# Patient Record
Sex: FEMALE | Race: WHITE | NOT HISPANIC OR LATINO | Employment: OTHER | URBAN - METROPOLITAN AREA
[De-identification: names, ages, dates, MRNs, and addresses within clinical notes are randomized per-mention and may not be internally consistent; named-entity substitution may affect disease eponyms.]

---

## 2017-01-04 DIAGNOSIS — G89.29 CHRONIC THORACIC BACK PAIN, UNSPECIFIED BACK PAIN LATERALITY: ICD-10-CM

## 2017-01-04 DIAGNOSIS — M51.36 DDD (DEGENERATIVE DISC DISEASE), LUMBAR: ICD-10-CM

## 2017-01-04 DIAGNOSIS — M54.12 CERVICAL RADICULOPATHY: ICD-10-CM

## 2017-01-04 DIAGNOSIS — M54.6 CHRONIC THORACIC BACK PAIN, UNSPECIFIED BACK PAIN LATERALITY: ICD-10-CM

## 2017-01-04 RX ORDER — MELOXICAM 7.5 MG/1
TABLET ORAL
Qty: 30 TABLET | Refills: 0 | Status: SHIPPED | OUTPATIENT
Start: 2017-01-04 | End: 2017-01-29 | Stop reason: SDUPTHER

## 2017-01-12 ENCOUNTER — PATIENT MESSAGE (OUTPATIENT)
Dept: FAMILY MEDICINE | Facility: CLINIC | Age: 62
End: 2017-01-12

## 2017-01-12 DIAGNOSIS — M25.551 RIGHT HIP PAIN: Primary | ICD-10-CM

## 2017-01-18 ENCOUNTER — HOSPITAL ENCOUNTER (OUTPATIENT)
Dept: RADIOLOGY | Facility: CLINIC | Age: 62
Discharge: HOME | End: 2017-01-18
Attending: FAMILY MEDICINE
Payer: COMMERCIAL

## 2017-01-18 DIAGNOSIS — M25.551 RIGHT HIP PAIN: ICD-10-CM

## 2017-01-18 DIAGNOSIS — J44.9 CHRONIC OBSTRUCTIVE PULMONARY DISEASE, UNSPECIFIED COPD TYPE: Chronic | ICD-10-CM

## 2017-01-18 PROCEDURE — 73502 X-RAY EXAM HIP UNI 2-3 VIEWS: CPT | Mod: 26,RT,S$GLB, | Performed by: RADIOLOGY

## 2017-01-18 PROCEDURE — 73502 X-RAY EXAM HIP UNI 2-3 VIEWS: CPT | Mod: TC,PO,RT

## 2017-01-18 RX ORDER — ALBUTEROL SULFATE 90 UG/1
AEROSOL, METERED RESPIRATORY (INHALATION)
Qty: 18 G | Refills: 0 | Status: SHIPPED | OUTPATIENT
Start: 2017-01-18 | End: 2017-02-20 | Stop reason: SDUPTHER

## 2017-01-23 ENCOUNTER — OFFICE VISIT (OUTPATIENT)
Dept: ORTHOPEDICS | Facility: CLINIC | Age: 62
End: 2017-01-23
Payer: COMMERCIAL

## 2017-01-23 VITALS — HEIGHT: 69 IN | WEIGHT: 158 LBS | BODY MASS INDEX: 23.4 KG/M2

## 2017-01-23 DIAGNOSIS — M16.9 HIP ARTHROSIS: Primary | ICD-10-CM

## 2017-01-23 DIAGNOSIS — J44.9 CHRONIC OBSTRUCTIVE PULMONARY DISEASE, UNSPECIFIED COPD TYPE: Chronic | ICD-10-CM

## 2017-01-23 PROCEDURE — 1159F MED LIST DOCD IN RCRD: CPT | Mod: S$GLB,,, | Performed by: ORTHOPAEDIC SURGERY

## 2017-01-23 PROCEDURE — 99214 OFFICE O/P EST MOD 30 MIN: CPT | Mod: 57,S$GLB,, | Performed by: ORTHOPAEDIC SURGERY

## 2017-01-23 PROCEDURE — 99999 PR PBB SHADOW E&M-EST. PATIENT-LVL II: CPT | Mod: PBBFAC,,, | Performed by: ORTHOPAEDIC SURGERY

## 2017-01-23 NOTE — PROGRESS NOTES
Abbey Hagan, 61 years old, right-sided hip pain, debilitating 10/10 on the   pain scale.  Previously undergone a left-sided hip replacement surgery,   satisfied.  At this point, she is interested in undergoing right-sided hip   replacement surgery.    PHYSICAL EXAMINATION:  Shows that hip range of motion reproduces symptoms.  Skin   is intact.  Compartments are soft.    X-rays show arthritic changes of the dysplastic hip.    ASSESSMENT:  Right hip arthrosis.    PLAN:  We will schedule for hip replacement surgery.  The patient is aware of   the risks of surgery and still wants to proceed.  We will therefore see her at   the time of surgery.      PBB/PN  dd: 01/23/2017 15:17:55 (CST)  td: 01/23/2017 18:48:02 (CST)  Doc ID   #[id number]  Job ID #[id number]    CC:     Further History  Aching pain  Worse with activity  Relieved with rest  No other associated symptoms  No other radiation    Further Exam  Alert and oriented  Pleasant  Contralateral limb has appropriate range of motion for age and condition  Contralateral limb has appropriate strength for age and condition  Contralateral limb has appropriate stability  for age and condition  No adenopathy  Pulses are appropriate for current condition  Skin is intact        Chief Complaint    Chief Complaint   Patient presents with    Hip Pain     right       HPI  Abbey Hagan is a 61 y.o.  female who presents with       Past Medical History  Past Medical History   Diagnosis Date    Arthritis      back, neck and knees    Depression     Emphysema of lung     Hypertension        Past Surgical History  No past surgical history on file.    Medications  Current Outpatient Prescriptions   Medication Sig    acyclovir 5% (ZOVIRAX) 5 % Crea 1 Application(s) Topical PRN Twice a day.    ascorbic acid (C-500) 500 MG tablet Every day    b complex vitamins (B-50 COMPLEX) tablet Every day    biotin 1 mg tablet Take 1,000 mcg by mouth 3 (three) times daily.      CHOLECALCIFEROL,  VITAMIN D3, (VITAMIN D-3 ORAL) No Sig Provided    escitalopram oxalate (LEXAPRO) 20 MG tablet TAKE 1 TABLET BY MOUTH EVERY MORNING    lisinopril-hydrochlorothiazide (PRINZIDE,ZESTORETIC) 20-25 mg Tab TAKE 1/2 TABLET BY MOUTH EVERY MORNING    meloxicam (MOBIC) 7.5 MG tablet TAKE 1 TABLET(7.5 MG) BY MOUTH EVERY DAY    multivitamin capsule Every day    POTASSIUM/MAGNESIUM (MAGNESIUM-POTASSIUM ORAL) Every day    VENTOLIN HFA 90 mcg/actuation inhaler INHALE 2 PUFFS INTO THE LUNGS EVERY 6 HOURS AS NEEDED FOR WHEEZING    zinc 50 mg Cap Every day     No current facility-administered medications for this visit.        Allergies  Review of patient's allergies indicates:   Allergen Reactions    Penicillin g     Sulfa (sulfonamide antibiotics)        Family History  Family History   Problem Relation Age of Onset    COPD Mother     Hypertension Mother     Heart disease Mother     Alcohol abuse Father     Depression Sister     Hypertension Sister     Colon cancer Maternal Grandmother     Breast cancer Paternal Aunt     Ovarian cancer Neg Hx     Psoriasis Neg Hx     Lupus Neg Hx     Eczema Neg Hx     Melanoma Neg Hx        Social History  Social History     Social History    Marital status: Single     Spouse name: N/A    Number of children: N/A    Years of education: N/A     Occupational History     [employer]     Social History Main Topics    Smoking status: Former Smoker     Packs/day: 1.00     Years: 40.00     Types: Cigarettes     Quit date: 3/15/2015    Smokeless tobacco: Not on file    Alcohol use No    Drug use: No    Sexual activity: Not Currently     Other Topics Concern    Not on file     Social History Narrative               Review of Systems     Constitutional: Negative    HENT: Negative  Eyes: Negative  Respiratory: Negative  Cardiovascular: Negative  Musculoskeletal: HPI  Skin: Negative  Neurological: Negative  Hematological: Negative  Endocrine: Negative                 Physical  Exam    There were no vitals filed for this visit.  Body mass index is 23.33 kg/(m^2).  Physical Examination:     General appearance -  well appearing, and in no distress  Mental status - awake  Neck - supple  Chest -  symmetric air entry  Heart - normal rate   Abdomen - soft      Assessment     1. Hip arthrosis    2. Chronic obstructive pulmonary disease, unspecified COPD type          Plan

## 2017-01-24 ENCOUNTER — TELEPHONE (OUTPATIENT)
Dept: ORTHOPEDICS | Facility: CLINIC | Age: 62
End: 2017-01-24

## 2017-01-24 NOTE — TELEPHONE ENCOUNTER
----- Message from Analia Pedro sent at 1/24/2017  3:25 PM CST -----  Patient is requesting a call back concerning changing the date of her surgery contact her at [phone].    Thank you

## 2017-01-25 DIAGNOSIS — M16.9 HIP ARTHROSIS: ICD-10-CM

## 2017-01-25 DIAGNOSIS — M16.11 PRIMARY OSTEOARTHRITIS OF RIGHT HIP: Primary | ICD-10-CM

## 2017-01-25 RX ORDER — ESCITALOPRAM OXALATE 20 MG/1
TABLET ORAL
Qty: 30 TABLET | Refills: 0 | Status: SHIPPED | OUTPATIENT
Start: 2017-01-25 | End: 2017-02-22 | Stop reason: SDUPTHER

## 2017-01-25 NOTE — TELEPHONE ENCOUNTER
----- Message from Shelli Gutierrez LPN sent at 1/25/2017  1:46 PM CST -----  Pt is scheduled for right total hip arthroplasty on 2/14/17 with Dr. Sorto, are you able to clear by chart or fit in for a preop clearance?    Thanks,  Shelli

## 2017-01-26 ENCOUNTER — PATIENT MESSAGE (OUTPATIENT)
Dept: FAMILY MEDICINE | Facility: CLINIC | Age: 62
End: 2017-01-26

## 2017-01-26 ENCOUNTER — TELEPHONE (OUTPATIENT)
Dept: FAMILY MEDICINE | Facility: CLINIC | Age: 62
End: 2017-01-26

## 2017-01-26 DIAGNOSIS — Z96.641 STATUS POST TOTAL REPLACEMENT OF RIGHT HIP: Primary | ICD-10-CM

## 2017-01-26 RX ORDER — OXYCODONE AND ACETAMINOPHEN 5; 325 MG/1; MG/1
1 TABLET ORAL
Qty: 47 TABLET | Refills: 0 | Status: SHIPPED | OUTPATIENT
Start: 2017-02-13 | End: 2017-06-28 | Stop reason: SDDI

## 2017-01-26 RX ORDER — WARFARIN SODIUM 5 MG/1
5 TABLET ORAL DAILY
Qty: 30 TABLET | Refills: 1 | Status: SHIPPED | OUTPATIENT
Start: 2017-02-13 | End: 2017-04-27 | Stop reason: ALTCHOICE

## 2017-01-26 NOTE — TELEPHONE ENCOUNTER
The patient has been scheduled for pre op clearance with Dr Garcia on 2/9/17 at 2 pm. The patient has been informed and agrees to the date and time.

## 2017-01-26 NOTE — TELEPHONE ENCOUNTER
----- Message from Shelli Gutierrez LPN sent at 1/26/2017 11:07 AM CST -----  Can pt be seen by another provider for clearance? Surgery is scheduled 2/14/17.    Thanks,  Shelli

## 2017-01-29 DIAGNOSIS — M54.6 CHRONIC THORACIC BACK PAIN, UNSPECIFIED BACK PAIN LATERALITY: ICD-10-CM

## 2017-01-29 DIAGNOSIS — G89.29 CHRONIC THORACIC BACK PAIN, UNSPECIFIED BACK PAIN LATERALITY: ICD-10-CM

## 2017-01-29 DIAGNOSIS — M54.12 CERVICAL RADICULOPATHY: ICD-10-CM

## 2017-01-29 DIAGNOSIS — M51.36 DDD (DEGENERATIVE DISC DISEASE), LUMBAR: ICD-10-CM

## 2017-01-30 ENCOUNTER — PATIENT MESSAGE (OUTPATIENT)
Dept: ORTHOPEDICS | Facility: CLINIC | Age: 62
End: 2017-01-30

## 2017-01-30 ENCOUNTER — ANTI-COAG VISIT (OUTPATIENT)
Dept: CARDIOLOGY | Facility: CLINIC | Age: 62
End: 2017-01-30

## 2017-01-30 RX ORDER — MELOXICAM 7.5 MG/1
TABLET ORAL
Qty: 30 TABLET | Refills: 0 | Status: SHIPPED | OUTPATIENT
Start: 2017-01-30 | End: 2017-02-23 | Stop reason: SDUPTHER

## 2017-02-09 ENCOUNTER — LAB VISIT (OUTPATIENT)
Dept: LAB | Facility: HOSPITAL | Age: 62
End: 2017-02-09
Attending: FAMILY MEDICINE
Payer: COMMERCIAL

## 2017-02-09 ENCOUNTER — OFFICE VISIT (OUTPATIENT)
Dept: FAMILY MEDICINE | Facility: CLINIC | Age: 62
End: 2017-02-09
Payer: COMMERCIAL

## 2017-02-09 ENCOUNTER — HOSPITAL ENCOUNTER (OUTPATIENT)
Dept: RADIOLOGY | Facility: CLINIC | Age: 62
Discharge: HOME | End: 2017-02-09
Attending: FAMILY MEDICINE
Payer: COMMERCIAL

## 2017-02-09 VITALS
HEART RATE: 78 BPM | BODY MASS INDEX: 22.3 KG/M2 | TEMPERATURE: 99 F | DIASTOLIC BLOOD PRESSURE: 69 MMHG | HEIGHT: 69 IN | OXYGEN SATURATION: 96 % | SYSTOLIC BLOOD PRESSURE: 121 MMHG | WEIGHT: 150.56 LBS

## 2017-02-09 DIAGNOSIS — Z96.641 STATUS POST TOTAL REPLACEMENT OF RIGHT HIP: Primary | ICD-10-CM

## 2017-02-09 DIAGNOSIS — Z01.818 PRE-OP EXAMINATION: ICD-10-CM

## 2017-02-09 DIAGNOSIS — Z01.818 PRE-OP EXAMINATION: Primary | ICD-10-CM

## 2017-02-09 LAB
ANION GAP SERPL CALC-SCNC: 7 MMOL/L
BASOPHILS # BLD AUTO: 0.03 K/UL
BASOPHILS NFR BLD: 0.3 %
BUN SERPL-MCNC: 12 MG/DL
CALCIUM SERPL-MCNC: 9.5 MG/DL
CHLORIDE SERPL-SCNC: 96 MMOL/L
CO2 SERPL-SCNC: 33 MMOL/L
CREAT SERPL-MCNC: 0.8 MG/DL
DIFFERENTIAL METHOD: ABNORMAL
EOSINOPHIL # BLD AUTO: 0.1 K/UL
EOSINOPHIL NFR BLD: 1.4 %
ERYTHROCYTE [DISTWIDTH] IN BLOOD BY AUTOMATED COUNT: 12.8 %
EST. GFR  (AFRICAN AMERICAN): >60 ML/MIN/1.73 M^2
EST. GFR  (NON AFRICAN AMERICAN): >60 ML/MIN/1.73 M^2
GLUCOSE SERPL-MCNC: 96 MG/DL
HCT VFR BLD AUTO: 43.2 %
HGB BLD-MCNC: 14.6 G/DL
INR PPP: 1
LYMPHOCYTES # BLD AUTO: 2.2 K/UL
LYMPHOCYTES NFR BLD: 22.6 %
MCH RBC QN AUTO: 32.7 PG
MCHC RBC AUTO-ENTMCNC: 33.8 %
MCV RBC AUTO: 97 FL
MONOCYTES # BLD AUTO: 0.9 K/UL
MONOCYTES NFR BLD: 9.3 %
NEUTROPHILS # BLD AUTO: 6.6 K/UL
NEUTROPHILS NFR BLD: 66.2 %
PLATELET # BLD AUTO: 252 K/UL
PMV BLD AUTO: 10.8 FL
POTASSIUM SERPL-SCNC: 3.6 MMOL/L
PROTHROMBIN TIME: 10.3 SEC
RBC # BLD AUTO: 4.47 M/UL
SODIUM SERPL-SCNC: 136 MMOL/L
WBC # BLD AUTO: 9.93 K/UL

## 2017-02-09 PROCEDURE — 36415 COLL VENOUS BLD VENIPUNCTURE: CPT | Mod: PO

## 2017-02-09 PROCEDURE — 99213 OFFICE O/P EST LOW 20 MIN: CPT | Mod: S$GLB,,, | Performed by: FAMILY MEDICINE

## 2017-02-09 PROCEDURE — 3078F DIAST BP <80 MM HG: CPT | Mod: S$GLB,,, | Performed by: FAMILY MEDICINE

## 2017-02-09 PROCEDURE — 85610 PROTHROMBIN TIME: CPT

## 2017-02-09 PROCEDURE — 93010 ELECTROCARDIOGRAM REPORT: CPT | Mod: S$GLB,,, | Performed by: INTERNAL MEDICINE

## 2017-02-09 PROCEDURE — 3074F SYST BP LT 130 MM HG: CPT | Mod: S$GLB,,, | Performed by: FAMILY MEDICINE

## 2017-02-09 PROCEDURE — 71020 XR CHEST PA AND LATERAL: CPT | Mod: TC,PO

## 2017-02-09 PROCEDURE — 85025 COMPLETE CBC W/AUTO DIFF WBC: CPT

## 2017-02-09 PROCEDURE — 99999 PR PBB SHADOW E&M-EST. PATIENT-LVL III: CPT | Mod: PBBFAC,,, | Performed by: FAMILY MEDICINE

## 2017-02-09 PROCEDURE — 80048 BASIC METABOLIC PNL TOTAL CA: CPT

## 2017-02-09 PROCEDURE — 93005 ELECTROCARDIOGRAM TRACING: CPT | Mod: S$GLB,,, | Performed by: FAMILY MEDICINE

## 2017-02-09 PROCEDURE — 71020 XR CHEST PA AND LATERAL: CPT | Mod: 26,,, | Performed by: RADIOLOGY

## 2017-02-09 RX ORDER — CLINDAMYCIN HYDROCHLORIDE 300 MG/1
600 CAPSULE ORAL 3 TIMES DAILY
Qty: 30 CAPSULE | Refills: 0 | Status: SHIPPED | OUTPATIENT
Start: 2017-02-14 | End: 2017-02-19

## 2017-02-09 NOTE — PROGRESS NOTES
Subjective:     Pt presents to the office today for a preoperative consultation at the request of surgeon Dr. Holden who plans on performing T-hip on right on February 14.   This consultation is requested for the specific conditions prompting preoperative evaluation (i.e. because of potential affect on operative risk): Preop clearance.   Planned anesthesia: general.   The patient has the following known anesthesia issues: No history of complications 2/2 anesthesia. .   Patients bleeding risk: no recent abnormal bleeding.   Patient does not have objections to receiving blood products if needed.    The following portions of the patient's history were reviewed and updated as appropriate: allergies, current medications, past family history, past medical history, past social history, past surgical history and problem list.    Review of Systems  Review of Systems   Constitutional: Negative for chills and fever.   HENT: Negative for sore throat.    Eyes: Negative for visual disturbance.   Respiratory: Negative for cough and shortness of breath.    Cardiovascular: Negative for chest pain and leg swelling.   Gastrointestinal: Negative for abdominal pain, blood in stool, constipation, diarrhea and vomiting.   Genitourinary: Negative for difficulty urinating, dysuria and hematuria.   Musculoskeletal: Positive for arthralgias.   Neurological: Negative for dizziness and weakness.      Objective:     Physical Exam   Constitutional: She appears well-developed and well-nourished. No distress.   HENT:   Head: Normocephalic and atraumatic.   Mouth/Throat: Oropharynx is clear and moist. No oropharyngeal exudate.   Eyes: EOM are normal. Pupils are equal, round, and reactive to light.   Neck: Normal range of motion. Neck supple. No thyromegaly present.   Cardiovascular: Normal rate, regular rhythm, normal heart sounds and intact distal pulses.    Pulmonary/Chest: Effort normal and breath sounds normal. No respiratory distress. She has  no wheezes.   Abdominal: Soft. Bowel sounds are normal. She exhibits no distension and no mass. There is no tenderness.   Musculoskeletal: She exhibits no edema.   Lymphadenopathy:     She has no cervical adenopathy.   Neurological: She is alert.   Skin: Skin is warm. No rash noted. No erythema.   Psychiatric: She has a normal mood and affect. Her behavior is normal.   Vitals reviewed.      Predictors of intubation difficulty:   Morbid obesity? no   Anatomically abnormal facies? no   Prominent incisors? no   Receding mandible? no   Short, thick neck? no   Neck range of motion: normal   Mallampati score: III (soft and hard palate and base of uvula visible)    Cardiographics  ECG: Sinus bradycardia    Imaging  Chest x-ray:   1.  No acute cardiopulmonary process.  2.  Appearance suggesting aortic stenosis.    Lab Review   Pending     Assessment:        73 y.o. female with planned surgery as above.    Known risk factors for perioperative complications: None    Difficulty with intubation is not anticipated.     Plan:     1. Pre-op examination  - CXR  - Basic metabolic panel; Future  - CBC auto differential; Future  - PROTIME-INR; Future  - EKG RHYTHM STRIP (to Muse); Future  - Urinalysis    2/13/17 Addendum:  CXR results show concern for aortic stenosis, therefore, will obtain Echo in order to evaluate.   Pt notified in face to face encounter and acknowledges understanding.   She is not cleared until further work up has been performed.       Revised Cardiac Risk Index for Pre-Operative Risk    [email or web address]    1. Preoperative workup as follows chest x-ray, ECG, hemoglobin, hematocrit, electrolytes, creatinine, glucose, coagulation studies.  2. Change in medication regimen before surgery: Not on anticoagulation   3. Prophylaxis for cardiac events with perioperative beta-blockers: not indicated.  4. Invasive hemodynamic monitoring perioperatively: at the discretion of  "anesthesiologist.  5. Deep vein thrombosis prophylaxis postoperatively:regimen to be chosen by surgical team.  6. Surveillance for postoperative MI with ECG immediately postoperatively and on postoperative days 1 and 2 AND troponin levels 24 hours postoperatively and on day 4 or hospital discharge (whichever comes first): at the discretion of anesthesiologist.  7. Other measures: None     Portions of this note were created using Dragon voice recognition software. There may be voice recognition errors found in the text, and attempts were made to correct these errors prior to signature    Daryl Garcia MD    Family Medicine  2/9/2017    Addendum: Recent echocardiogram was performed for concerns of aortic stenosis rest on plain films of the patient's chest.  Echocardiogram results do not suggest aortic stenosis present.      Per read," Aorta: The aortic root is normal in size, measuring 3.3 cm at sinotubular junction and 3.0 cm at Sinuses of Valsalva. The proximal ascending aorta is normal in size, measuring 3.4 cm across.     Based on these findings, patient is cleared for surgical procedure from a family medicine standpoint at this time.    Portions of this note were created using Dragon voice recognition software. There may be voice recognition errors found in the text, and attempts were made to correct these errors prior to signature    Daryl Garcia MD    [facility]  2/22/2017    "

## 2017-02-09 NOTE — PATIENT INSTRUCTIONS
How Your Back Works  A healthy back allows you to bend and stretch without pain. The spine has three natural curves, which keep your body balanced. Strong, flexible muscles support your spine. Soft, cushioning disks separate the hard bones of your spine, allowing it to bend and move.    The parts of the spine  · The vertebrae are the 24 bones that make up the spine.  · The spinous process is the part of each vertebra you can feel through your skin.  · Each of these bones has a canal that runs top to bottom. Together these canals form a tunnel called the spinal canal.  · The lamina of each vertebra forms the back of the spinal canal.  · Running through the canal are nerves.  · A foramen is a small opening where a nerve leaves the spinal canal.  · Disks serve as cushions between vertebrae. A disks soft center absorbs shock during movement.     Two vertebrae and a disk     The supporting muscles  Strong, flexible muscles help maintain your three natural curves. They hold your spine in proper alignment. This helps support your upper body. Strong core muscular including the stomach, buttock, and thigh muscles help take the strain off your back.  Date Last Reviewed: 8/31/2015  © 6758-9736 [employer]. [address] [place] [address], [place] [address]. All rights reserved. This information is not intended as a substitute for professional medical care. Always follow your healthcare professional's instructions.

## 2017-02-09 NOTE — MR AVS SNAPSHOT
[facility]  [address]  [place] [address]  Phone: [phone]  Fax: [phone]                  Abbey Hagan   2017 2:00 PM   Office Visit    Description:  Female : 1955   Provider:  Daryl Garcia MD   Department:  [facility]           Reason for Visit     Pre-op Exam           Diagnoses this Visit        Comments    Pre-op examination    -  Primary            To Do List           Future Appointments        Provider Department Dept Phone    2017 2:45 PM LAB, [facility] [place] [facility] - Lab [phone]    2017 3:30 PM [facility] XR1 [facility]- X-Ray [phone]    2017 9:00 AM NS [facility] [phone]    2017 11:15 AM Sandip Sorto MD [facility] [phone]    2017 4:20 PM Daryl Garcia MD [facility] [phone]      Your Future Surgeries/Procedures     2017   Surgery with Sandip Sorto MD   [facility]    [address] [facility] [address] [place] [address]   [phone]              Goals (5 Years of Data)     None      Follow-Up and Disposition     Return in about 6 months (around 2017).      [facility]     [facility] Care [facility] -  Assistance  Registered nurses in the [facility] provide clinical advisement, health education, appointment booking, and other advisory services.  Call for this free service at [phone].             Medications           Message regarding Medications     Verify the changes and/or additions to your medication regime listed below are the same as discussed with your clinician today.  If any of these changes or additions are incorrect, please notify your healthcare provider.             Verify that the below list of medications is an accurate representation of the medications you are currently taking.  If none reported, the list may be blank. If  "incorrect, please contact your healthcare provider. Carry this list with you in case of emergency.           Current Medications     acyclovir 5% (ZOVIRAX) 5 % Crea 1 Application(s) Topical PRN Twice a day.    ascorbic acid (C-500) 500 MG tablet Every day    b complex vitamins (B-50 COMPLEX) tablet Every day    biotin 1 mg tablet Take 1,000 mcg by mouth 3 (three) times daily.      CHOLECALCIFEROL, VITAMIN D3, (VITAMIN D-3 ORAL) No Sig Provided    clindamycin (CLEOCIN) 300 MG capsule Starting on Feb 14, 2017. Take 2 capsules (600 mg total) by mouth 3 (three) times daily.    escitalopram oxalate (LEXAPRO) 20 MG tablet TAKE 1 TABLET BY MOUTH EVERY MORNING    lisinopril-hydrochlorothiazide (PRINZIDE,ZESTORETIC) 20-25 mg Tab TAKE 1/2 TABLET BY MOUTH EVERY MORNING    meloxicam (MOBIC) 7.5 MG tablet TAKE 1 TABLET(7.5 MG) BY MOUTH EVERY DAY    multivitamin capsule Every day    oxycodone-acetaminophen (PERCOCET) 5-325 mg per tablet Starting on Feb 13, 2017. Take 1 tablet by mouth every 4 to 6 hours as needed for Pain.    POTASSIUM/MAGNESIUM (MAGNESIUM-POTASSIUM ORAL) Every day    VENTOLIN HFA 90 mcg/actuation inhaler INHALE 2 PUFFS INTO THE LUNGS EVERY 6 HOURS AS NEEDED FOR WHEEZING    warfarin (COUMADIN) 5 MG tablet Starting on Feb 13, 2017. Take 1 tablet (5 mg total) by mouth Daily. Start night before surgery    zinc 50 mg Cap Every day           Clinical Reference Information           Your Vitals Were     BP Pulse Temp Height Weight Last Period    121/69 78 99 °F (37.2 °C) 5' 9" (1.753 m) 68.3 kg (150 lb 9.2 oz) 12/06/1988    SpO2 BMI             96% 22.24 kg/m2         Blood Pressure          Most Recent Value    BP  121/69      Allergies as of 2/9/2017     Penicillin G    Sulfa (Sulfonamide Antibiotics)      Immunizations Administered on Date of Encounter - 2/9/2017     None      Orders Placed During Today's Visit      Normal Orders This Visit    Urinalysis     Future Labs/Procedures Expected by Expires    Basic " metabolic panel  2/9/2017 4/10/2018    CBC auto differential  2/9/2017 4/10/2018    PROTIME-INR  2/9/2017 4/10/2018    X-Ray Chest PA And Lateral  2/9/2017 2/9/2018    EKG RHYTHM STRIP (to Muse)  As directed 2/9/2018      Instructions      How Your Back Works  A healthy back allows you to bend and stretch without pain. The spine has three natural curves, which keep your body balanced. Strong, flexible muscles support your spine. Soft, cushioning disks separate the hard bones of your spine, allowing it to bend and move.    The parts of the spine  · The vertebrae are the 24 bones that make up the spine.  · The spinous process is the part of each vertebra you can feel through your skin.  · Each of these bones has a canal that runs top to bottom. Together these canals form a tunnel called the spinal canal.  · The lamina of each vertebra forms the back of the spinal canal.  · Running through the canal are nerves.  · A foramen is a small opening where a nerve leaves the spinal canal.  · Disks serve as cushions between vertebrae. A disks soft center absorbs shock during movement.     Two vertebrae and a disk     The supporting muscles  Strong, flexible muscles help maintain your three natural curves. They hold your spine in proper alignment. This helps support your upper body. Strong core muscular including the stomach, buttock, and thigh muscles help take the strain off your back.  Date Last Reviewed: 8/31/2015  © 4222-8569 [employer]. [address] [place] [address]. All rights reserved. This information is not intended as a substitute for professional medical care. Always follow your healthcare professional's instructions.             Language Assistance Services     ATTENTION: Language assistance services are available, free of charge. Please call [phone].      ATENCIÓN: Si habla español, tiene a panda disposición servicios gratuitos de asistencia lingüística. Llame al  [phone].     FRANCESCO Ý: N?u b?n nói Ti?ng Vi?t, có các d?ch v? h? tr? ngôn ng? mi?n phí dành cho b?n. G?i s? [phone].         [facility] complies with applicable Federal civil rights laws and does not discriminate on the basis of race, color, national origin, age, disability, or sex.

## 2017-02-10 ENCOUNTER — TELEPHONE (OUTPATIENT)
Dept: FAMILY MEDICINE | Facility: CLINIC | Age: 62
End: 2017-02-10

## 2017-02-13 ENCOUNTER — PATIENT MESSAGE (OUTPATIENT)
Dept: FAMILY MEDICINE | Facility: CLINIC | Age: 62
End: 2017-02-13

## 2017-02-13 ENCOUNTER — TELEPHONE (OUTPATIENT)
Dept: FAMILY MEDICINE | Facility: CLINIC | Age: 62
End: 2017-02-13

## 2017-02-13 ENCOUNTER — ANESTHESIA EVENT (OUTPATIENT)
Dept: SURGERY | Facility: HOSPITAL | Age: 62
End: 2017-02-13
Payer: COMMERCIAL

## 2017-02-13 DIAGNOSIS — R93.89 ABNORMAL CHEST X-RAY: Primary | ICD-10-CM

## 2017-02-13 DIAGNOSIS — Z96.649 STATUS POST TOTAL REPLACEMENT OF HIP, UNSPECIFIED LATERALITY: Primary | ICD-10-CM

## 2017-02-13 RX ORDER — IBUPROFEN 800 MG/1
800 TABLET ORAL 3 TIMES DAILY
Qty: 30 TABLET | Refills: 3 | Status: SHIPPED | OUTPATIENT
Start: 2017-02-13 | End: 2017-12-29 | Stop reason: SDUPTHER

## 2017-02-13 NOTE — TELEPHONE ENCOUNTER
----- Message from Neeru Mcleod sent at 2/10/2017 12:15 PM CST -----  Contact: Abbey Sheehan is returning call. Please call [phone]. Thanks!

## 2017-02-14 ENCOUNTER — ANESTHESIA (OUTPATIENT)
Dept: SURGERY | Facility: HOSPITAL | Age: 62
End: 2017-02-14
Payer: COMMERCIAL

## 2017-02-14 NOTE — TELEPHONE ENCOUNTER
----- Message from Refugio De Leon sent at 2/14/2017  9:19 AM CST -----  Contact: pt  Pt is calling to schedule a test  Call Back#[phone]  Thanks

## 2017-02-16 ENCOUNTER — PATIENT MESSAGE (OUTPATIENT)
Dept: ORTHOPEDICS | Facility: CLINIC | Age: 62
End: 2017-02-16

## 2017-02-20 DIAGNOSIS — J44.9 CHRONIC OBSTRUCTIVE PULMONARY DISEASE, UNSPECIFIED COPD TYPE: Chronic | ICD-10-CM

## 2017-02-21 ENCOUNTER — HOSPITAL ENCOUNTER (OUTPATIENT)
Dept: CARDIOLOGY | Facility: HOSPITAL | Age: 62
Discharge: HOME | End: 2017-02-21
Attending: FAMILY MEDICINE
Payer: COMMERCIAL

## 2017-02-21 ENCOUNTER — PATIENT MESSAGE (OUTPATIENT)
Dept: ORTHOPEDICS | Facility: CLINIC | Age: 62
End: 2017-02-21

## 2017-02-21 DIAGNOSIS — R93.89 ABNORMAL CHEST X-RAY: ICD-10-CM

## 2017-02-21 LAB
DIASTOLIC DYSFUNCTION: NO
ESTIMATED PA SYSTOLIC PRESSURE: 26.04
RETIRED EF AND QEF - SEE NOTES: 65 (ref 55–65)

## 2017-02-21 PROCEDURE — 63600175 PHARM REV CODE 636 W HCPCS: Performed by: INTERNAL MEDICINE

## 2017-02-21 PROCEDURE — 96374 THER/PROPH/DIAG INJ IV PUSH: CPT

## 2017-02-21 PROCEDURE — 93306 TTE W/DOPPLER COMPLETE: CPT | Mod: 26,,, | Performed by: INTERNAL MEDICINE

## 2017-02-21 RX ORDER — ALBUTEROL SULFATE 90 UG/1
AEROSOL, METERED RESPIRATORY (INHALATION)
Qty: 18 G | Refills: 0 | Status: SHIPPED | OUTPATIENT
Start: 2017-02-21 | End: 2017-03-19 | Stop reason: SDUPTHER

## 2017-02-22 ENCOUNTER — PATIENT MESSAGE (OUTPATIENT)
Dept: FAMILY MEDICINE | Facility: CLINIC | Age: 62
End: 2017-02-22

## 2017-02-22 ENCOUNTER — TELEPHONE (OUTPATIENT)
Dept: FAMILY MEDICINE | Facility: CLINIC | Age: 62
End: 2017-02-22

## 2017-02-22 ENCOUNTER — PATIENT MESSAGE (OUTPATIENT)
Dept: ORTHOPEDICS | Facility: CLINIC | Age: 62
End: 2017-02-22

## 2017-02-22 RX ORDER — ESCITALOPRAM OXALATE 20 MG/1
TABLET ORAL
Qty: 30 TABLET | Refills: 0 | Status: SHIPPED | OUTPATIENT
Start: 2017-02-22 | End: 2017-03-27 | Stop reason: SDUPTHER

## 2017-02-22 NOTE — TELEPHONE ENCOUNTER
"Based on recent echocardiogram results, no evidence of aortic stenosis per read.     "Aorta: The aortic root is normal in size, measuring 3.3 cm at sinotubular junction and 3.0 cm at Sinuses of Valsalva. The proximal ascending aorta is normal in size, measuring 3.4 cm across. "    Based on these results, patient is cleared for surgery from a family medicine standpoint at this time.  Please refer to preoperative note on February 19, 2017 as an addendum has been made to reflect this.    Portions of this note were created using Dragon voice recognition software. There may be voice recognition errors found in the text, and attempts were made to correct these errors prior to signature    Daryl Garcia MD    Family Medicine  2/22/2017    "

## 2017-02-22 NOTE — TELEPHONE ENCOUNTER
Please advise on surgery clearance once you review her echo. I'm not sure if you have paperwork or not for her.

## 2017-02-22 NOTE — TELEPHONE ENCOUNTER
----- Message from Shelli Gutierrez LPN sent at 2/22/2017  7:16 AM CST -----  Pt is ready to schedule surgery. Echo done yesterday. Is she clear for surgery?      Thanks,  Shelli

## 2017-02-23 DIAGNOSIS — M51.36 DDD (DEGENERATIVE DISC DISEASE), LUMBAR: ICD-10-CM

## 2017-02-23 DIAGNOSIS — M54.6 CHRONIC THORACIC BACK PAIN, UNSPECIFIED BACK PAIN LATERALITY: ICD-10-CM

## 2017-02-23 DIAGNOSIS — M54.12 CERVICAL RADICULOPATHY: ICD-10-CM

## 2017-02-23 DIAGNOSIS — G89.29 CHRONIC THORACIC BACK PAIN, UNSPECIFIED BACK PAIN LATERALITY: ICD-10-CM

## 2017-02-23 RX ORDER — MELOXICAM 7.5 MG/1
TABLET ORAL
Qty: 30 TABLET | Refills: 0 | Status: SHIPPED | OUTPATIENT
Start: 2017-02-23 | End: 2017-06-28 | Stop reason: SDUPTHER

## 2017-02-23 NOTE — TELEPHONE ENCOUNTER
Ibuprofen on med list.  Please advise not to take ibuprofen with meloxicam at the same time since they are both NSAID's.

## 2017-03-09 ENCOUNTER — PATIENT MESSAGE (OUTPATIENT)
Dept: ORTHOPEDICS | Facility: CLINIC | Age: 62
End: 2017-03-09

## 2017-03-13 ENCOUNTER — LAB VISIT (OUTPATIENT)
Dept: LAB | Facility: HOSPITAL | Age: 62
End: 2017-03-13
Attending: FAMILY MEDICINE
Payer: COMMERCIAL

## 2017-03-13 DIAGNOSIS — Z96.649 STATUS POST TOTAL REPLACEMENT OF HIP, UNSPECIFIED LATERALITY: ICD-10-CM

## 2017-03-13 LAB
ABO + RH BLD: NORMAL
BLD GP AB SCN CELLS X3 SERPL QL: NORMAL

## 2017-03-13 PROCEDURE — 86900 BLOOD TYPING SEROLOGIC ABO: CPT

## 2017-03-13 PROCEDURE — 86901 BLOOD TYPING SEROLOGIC RH(D): CPT

## 2017-03-13 PROCEDURE — 36415 COLL VENOUS BLD VENIPUNCTURE: CPT

## 2017-03-13 NOTE — H&P
Abbey Hagan, 61 years old, right-sided hip pain, debilitating 10/10 on the   pain scale. Previously undergone a left-sided hip replacement surgery,   satisfied. At this point, she is interested in undergoing right-sided hip   replacement surgery.     PHYSICAL EXAMINATION: Shows that hip range of motion reproduces symptoms. Skin  is intact. Compartments are soft.     X-rays show arthritic changes of the dysplastic hip.     ASSESSMENT: Right hip arthrosis.     PLAN: We will schedule for hip replacement surgery. The patient is aware of   the risks of surgery and still wants to proceed. We will therefore see her at   the time of surgery.        PBB/PN dd: 01/23/2017 15:17:55 (CST) td: 01/23/2017 18:48:02 (CST) Doc ID   #[id number] Job ID #[id number]     CC:      Further History  Aching pain  Worse with activity  Relieved with rest  No other associated symptoms  No other radiation     Further Exam  Alert and oriented  Pleasant  Contralateral limb has appropriate range of motion for age and condition  Contralateral limb has appropriate strength for age and condition  Contralateral limb has appropriate stability for age and condition  No adenopathy  Pulses are appropriate for current condition  Skin is intact           Chief Complaint          Chief Complaint   Patient presents with    Hip Pain       right         HPI  Abbey Hagan is a 61 y.o. female who presents with         Past Medical History        Past Medical History   Diagnosis Date    Arthritis         back, neck and knees    Depression      Emphysema of lung      Hypertension           Past Surgical History  No past surgical history on file.     Medications       Current Outpatient Prescriptions   Medication Sig    acyclovir 5% (ZOVIRAX) 5 % Crea 1 Application(s) Topical PRN Twice a day.    ascorbic acid (C-500) 500 MG tablet Every day    b complex vitamins (B-50 COMPLEX) tablet Every day    biotin 1 mg tablet Take 1,000 mcg by mouth 3 (three) times  daily.     CHOLECALCIFEROL, VITAMIN D3, (VITAMIN D-3 ORAL) No Sig Provided    escitalopram oxalate (LEXAPRO) 20 MG tablet TAKE 1 TABLET BY MOUTH EVERY MORNING    lisinopril-hydrochlorothiazide (PRINZIDE,ZESTORETIC) 20-25 mg Tab TAKE 1/2 TABLET BY MOUTH EVERY MORNING    meloxicam (MOBIC) 7.5 MG tablet TAKE 1 TABLET(7.5 MG) BY MOUTH EVERY DAY    multivitamin capsule Every day    POTASSIUM/MAGNESIUM (MAGNESIUM-POTASSIUM ORAL) Every day    VENTOLIN HFA 90 mcg/actuation inhaler INHALE 2 PUFFS INTO THE LUNGS EVERY 6 HOURS AS NEEDED FOR WHEEZING    zinc 50 mg Cap Every day      No current facility-administered medications for this visit.          Allergies       Review of patient's allergies indicates:   Allergen Reactions    Penicillin g      Sulfa (sulfonamide antibiotics)           Family History        Family History   Problem Relation Age of Onset    COPD Mother      Hypertension Mother      Heart disease Mother      Alcohol abuse Father      Depression Sister      Hypertension Sister      Colon cancer Maternal Grandmother      Breast cancer Paternal Aunt      Ovarian cancer Neg Hx      Psoriasis Neg Hx      Lupus Neg Hx      Eczema Neg Hx      Melanoma Neg Hx           Social History   Social History    Social History            Social History    Marital status: Single       Spouse name: N/A    Number of children: N/A    Years of education: N/A           Occupational History      [employer]      Social History Main Topics    Smoking status: Former Smoker       Packs/day: 1.00       Years: 40.00       Types: Cigarettes       Quit date: 3/15/2015    Smokeless tobacco: Not on file    Alcohol use No    Drug use: No    Sexual activity: Not Currently           Other Topics Concern    Not on file      Social History Narrative                        Review of Systems      Constitutional: Negative   HENT: Negative  Eyes: Negative  Respiratory: Negative  Cardiovascular:  Negative  Musculoskeletal: HPI  Skin: Negative  Neurological: Negative  Hematological: Negative  Endocrine: Negative                        Physical Exam     There were no vitals filed for this visit.  Body mass index is 23.33 kg/(m^2).  Physical Examination:      General appearance - well appearing, and in no distress  Mental status - awake  Neck - supple  Chest - symmetric air entry  Heart - normal rate   Abdomen - soft        Assessment      1. Hip arthrosis    2. Chronic obstructive pulmonary disease, unspecified COPD type

## 2017-03-14 ENCOUNTER — HOSPITAL ENCOUNTER (OUTPATIENT)
Facility: HOSPITAL | Age: 62
Discharge: HOME | End: 2017-03-14
Attending: ORTHOPAEDIC SURGERY | Admitting: ORTHOPAEDIC SURGERY
Payer: COMMERCIAL

## 2017-03-14 ENCOUNTER — HOSPITAL ENCOUNTER (OUTPATIENT)
Dept: RADIOLOGY | Facility: HOSPITAL | Age: 62
Discharge: HOME | End: 2017-03-14
Attending: ORTHOPAEDIC SURGERY | Admitting: ORTHOPAEDIC SURGERY
Payer: COMMERCIAL

## 2017-03-14 DIAGNOSIS — G89.29 HIP PAIN, CHRONIC, RIGHT: ICD-10-CM

## 2017-03-14 DIAGNOSIS — M25.551 HIP PAIN, CHRONIC, RIGHT: ICD-10-CM

## 2017-03-14 DIAGNOSIS — M16.9 HIP ARTHROSIS: ICD-10-CM

## 2017-03-14 DIAGNOSIS — M16.11 PRIMARY OSTEOARTHRITIS OF RIGHT HIP: ICD-10-CM

## 2017-03-14 DIAGNOSIS — M25.551 RIGHT HIP PAIN: ICD-10-CM

## 2017-03-14 PROCEDURE — D9220A PRA ANESTHESIA: Mod: CRNA,,, | Performed by: NURSE ANESTHETIST, CERTIFIED REGISTERED

## 2017-03-14 PROCEDURE — 37000008 HC ANESTHESIA 1ST 15 MINUTES: Mod: PO | Performed by: ORTHOPAEDIC SURGERY

## 2017-03-14 PROCEDURE — 27201423 OPTIME MED/SURG SUP & DEVICES STERILE SUPPLY: Mod: PO | Performed by: ORTHOPAEDIC SURGERY

## 2017-03-14 PROCEDURE — 76000 FLUOROSCOPY <1 HR PHYS/QHP: CPT | Mod: TC,PO

## 2017-03-14 PROCEDURE — 76000 FLUOROSCOPY <1 HR PHYS/QHP: CPT | Mod: 26,,, | Performed by: RADIOLOGY

## 2017-03-14 PROCEDURE — D9220A PRA ANESTHESIA: Mod: ANES,,, | Performed by: ANESTHESIOLOGY

## 2017-03-14 PROCEDURE — 37000009 HC ANESTHESIA EA ADD 15 MINS: Mod: PO | Performed by: ORTHOPAEDIC SURGERY

## 2017-03-14 PROCEDURE — 25000003 PHARM REV CODE 250: Mod: PO | Performed by: NURSE ANESTHETIST, CERTIFIED REGISTERED

## 2017-03-14 PROCEDURE — 27130 TOTAL HIP ARTHROPLASTY: CPT | Mod: RT,,, | Performed by: ORTHOPAEDIC SURGERY

## 2017-03-14 PROCEDURE — C1713 ANCHOR/SCREW BN/BN,TIS/BN: HCPCS | Mod: PO | Performed by: ORTHOPAEDIC SURGERY

## 2017-03-14 PROCEDURE — 63600175 PHARM REV CODE 636 W HCPCS: Mod: PO | Performed by: ANESTHESIOLOGY

## 2017-03-14 PROCEDURE — 71000039 HC RECOVERY, EACH ADD'L HOUR: Mod: PO | Performed by: ORTHOPAEDIC SURGERY

## 2017-03-14 PROCEDURE — 73502 X-RAY EXAM HIP UNI 2-3 VIEWS: CPT | Mod: 26,RT,, | Performed by: RADIOLOGY

## 2017-03-14 PROCEDURE — C1776 JOINT DEVICE (IMPLANTABLE): HCPCS | Mod: PO | Performed by: ORTHOPAEDIC SURGERY

## 2017-03-14 PROCEDURE — 73501 X-RAY EXAM HIP UNI 1 VIEW: CPT | Mod: 26,RT,, | Performed by: RADIOLOGY

## 2017-03-14 PROCEDURE — 25000003 PHARM REV CODE 250: Mod: PO | Performed by: ORTHOPAEDIC SURGERY

## 2017-03-14 PROCEDURE — 73502 X-RAY EXAM HIP UNI 2-3 VIEWS: CPT | Mod: TC,PO,RT

## 2017-03-14 PROCEDURE — 73501 X-RAY EXAM HIP UNI 1 VIEW: CPT | Mod: TC,PO,RT

## 2017-03-14 PROCEDURE — 27200688 HC TRAY, SPINAL-HYPER/ ISOBARIC: Mod: PO | Performed by: ANESTHESIOLOGY

## 2017-03-14 PROCEDURE — 25000003 PHARM REV CODE 250: Mod: PO | Performed by: ANESTHESIOLOGY

## 2017-03-14 PROCEDURE — 36000710: Mod: PO | Performed by: ORTHOPAEDIC SURGERY

## 2017-03-14 PROCEDURE — 63600175 PHARM REV CODE 636 W HCPCS: Mod: PO | Performed by: NURSE ANESTHETIST, CERTIFIED REGISTERED

## 2017-03-14 PROCEDURE — 71000033 HC RECOVERY, INTIAL HOUR: Mod: PO | Performed by: ORTHOPAEDIC SURGERY

## 2017-03-14 PROCEDURE — 36000711: Mod: PO | Performed by: ORTHOPAEDIC SURGERY

## 2017-03-14 DEVICE — IMPLANTABLE DEVICE: Type: IMPLANTABLE DEVICE | Site: HIP | Status: FUNCTIONAL

## 2017-03-14 RX ORDER — HYDROCODONE BITARTRATE AND ACETAMINOPHEN 5; 325 MG/1; MG/1
1 TABLET ORAL EVERY 4 HOURS PRN
Status: DISCONTINUED | OUTPATIENT
Start: 2017-03-14 | End: 2017-03-14 | Stop reason: HOSPADM

## 2017-03-14 RX ORDER — LIDOCAINE HYDROCHLORIDE 10 MG/ML
1 INJECTION, SOLUTION EPIDURAL; INFILTRATION; INTRACAUDAL; PERINEURAL ONCE AS NEEDED
Status: DISCONTINUED | OUTPATIENT
Start: 2017-03-14 | End: 2017-03-14 | Stop reason: HOSPADM

## 2017-03-14 RX ORDER — PROPOFOL 10 MG/ML
VIAL (ML) INTRAVENOUS
Status: DISCONTINUED | OUTPATIENT
Start: 2017-03-14 | End: 2017-03-14

## 2017-03-14 RX ORDER — MIDAZOLAM HYDROCHLORIDE 1 MG/ML
INJECTION, SOLUTION INTRAMUSCULAR; INTRAVENOUS
Status: DISCONTINUED | OUTPATIENT
Start: 2017-03-14 | End: 2017-03-14

## 2017-03-14 RX ORDER — GLYCOPYRROLATE 0.2 MG/ML
INJECTION INTRAMUSCULAR; INTRAVENOUS
Status: DISCONTINUED | OUTPATIENT
Start: 2017-03-14 | End: 2017-03-14

## 2017-03-14 RX ORDER — LORAZEPAM 2 MG/ML
0.25 INJECTION INTRAMUSCULAR ONCE AS NEEDED
Status: DISCONTINUED | OUTPATIENT
Start: 2017-03-14 | End: 2017-03-14 | Stop reason: HOSPADM

## 2017-03-14 RX ORDER — FENTANYL CITRATE 50 UG/ML
25 INJECTION, SOLUTION INTRAMUSCULAR; INTRAVENOUS EVERY 5 MIN PRN
Status: DISCONTINUED | OUTPATIENT
Start: 2017-03-14 | End: 2017-03-14 | Stop reason: HOSPADM

## 2017-03-14 RX ORDER — FENTANYL CITRATE 50 UG/ML
INJECTION, SOLUTION INTRAMUSCULAR; INTRAVENOUS
Status: DISCONTINUED | OUTPATIENT
Start: 2017-03-14 | End: 2017-03-14

## 2017-03-14 RX ORDER — CELECOXIB 100 MG/1
200 CAPSULE ORAL ONCE
Status: COMPLETED | OUTPATIENT
Start: 2017-03-14 | End: 2017-03-14

## 2017-03-14 RX ORDER — SODIUM CHLORIDE, SODIUM LACTATE, POTASSIUM CHLORIDE, CALCIUM CHLORIDE 600; 310; 30; 20 MG/100ML; MG/100ML; MG/100ML; MG/100ML
INJECTION, SOLUTION INTRAVENOUS CONTINUOUS
Status: DISCONTINUED | OUTPATIENT
Start: 2017-03-14 | End: 2017-03-14 | Stop reason: HOSPADM

## 2017-03-14 RX ORDER — TRANEXAMIC ACID 100 MG/ML
INJECTION, SOLUTION INTRAVENOUS
Status: DISCONTINUED | OUTPATIENT
Start: 2017-03-14 | End: 2017-03-14

## 2017-03-14 RX ORDER — PROPOFOL 10 MG/ML
VIAL (ML) INTRAVENOUS CONTINUOUS PRN
Status: DISCONTINUED | OUTPATIENT
Start: 2017-03-14 | End: 2017-03-14

## 2017-03-14 RX ORDER — ONDANSETRON 2 MG/ML
INJECTION INTRAMUSCULAR; INTRAVENOUS
Status: DISCONTINUED | OUTPATIENT
Start: 2017-03-14 | End: 2017-03-14

## 2017-03-14 RX ORDER — GABAPENTIN 100 MG/1
300 CAPSULE ORAL ONCE
Status: DISCONTINUED | OUTPATIENT
Start: 2017-03-14 | End: 2017-03-14 | Stop reason: HOSPADM

## 2017-03-14 RX ORDER — PHENYLEPHRINE HYDROCHLORIDE 10 MG/ML
INJECTION INTRAVENOUS
Status: DISCONTINUED | OUTPATIENT
Start: 2017-03-14 | End: 2017-03-14

## 2017-03-14 RX ORDER — ACETAMINOPHEN 10 MG/ML
1000 INJECTION, SOLUTION INTRAVENOUS ONCE
Status: COMPLETED | OUTPATIENT
Start: 2017-03-14 | End: 2017-03-14

## 2017-03-14 RX ORDER — BACITRACIN 50000 [IU]/1
INJECTION, POWDER, FOR SOLUTION INTRAMUSCULAR
Status: DISCONTINUED | OUTPATIENT
Start: 2017-03-14 | End: 2017-03-14 | Stop reason: HOSPADM

## 2017-03-14 RX ORDER — OXYCODONE HYDROCHLORIDE 5 MG/1
10 TABLET ORAL ONCE AS NEEDED
Status: COMPLETED | OUTPATIENT
Start: 2017-03-14 | End: 2017-03-14

## 2017-03-14 RX ORDER — TRANEXAMIC ACID 100 MG/ML
INJECTION, SOLUTION INTRAVENOUS
Status: DISCONTINUED | OUTPATIENT
Start: 2017-03-14 | End: 2017-03-14 | Stop reason: HOSPADM

## 2017-03-14 RX ORDER — GABAPENTIN 100 MG/1
600 CAPSULE ORAL
Status: COMPLETED | OUTPATIENT
Start: 2017-03-14 | End: 2017-03-14

## 2017-03-14 RX ORDER — LEVALBUTEROL 1.25 MG/.5ML
1.25 SOLUTION, CONCENTRATE RESPIRATORY (INHALATION)
Status: COMPLETED | OUTPATIENT
Start: 2017-03-14 | End: 2017-03-14

## 2017-03-14 RX ORDER — SODIUM CHLORIDE 0.9 % (FLUSH) 0.9 %
3 SYRINGE (ML) INJECTION
Status: DISCONTINUED | OUTPATIENT
Start: 2017-03-14 | End: 2017-03-14 | Stop reason: HOSPADM

## 2017-03-14 RX ORDER — DEXAMETHASONE SODIUM PHOSPHATE 4 MG/ML
8 INJECTION, SOLUTION INTRA-ARTICULAR; INTRALESIONAL; INTRAMUSCULAR; INTRAVENOUS; SOFT TISSUE
Status: COMPLETED | OUTPATIENT
Start: 2017-03-14 | End: 2017-03-14

## 2017-03-14 RX ORDER — KETAMINE HYDROCHLORIDE 100 MG/ML
INJECTION, SOLUTION INTRAMUSCULAR; INTRAVENOUS
Status: DISCONTINUED | OUTPATIENT
Start: 2017-03-14 | End: 2017-03-14

## 2017-03-14 RX ORDER — SODIUM CHLORIDE, SODIUM LACTATE, POTASSIUM CHLORIDE, CALCIUM CHLORIDE 600; 310; 30; 20 MG/100ML; MG/100ML; MG/100ML; MG/100ML
1000 INJECTION, SOLUTION INTRAVENOUS ONCE
Status: DISCONTINUED | OUTPATIENT
Start: 2017-03-14 | End: 2017-03-14

## 2017-03-14 RX ORDER — PREGABALIN 25 MG/1
75 CAPSULE ORAL ONCE
Status: DISCONTINUED | OUTPATIENT
Start: 2017-03-14 | End: 2017-03-14

## 2017-03-14 RX ORDER — OXYCODONE HCL 10 MG/1
10 TABLET, FILM COATED, EXTENDED RELEASE ORAL ONCE
Status: COMPLETED | OUTPATIENT
Start: 2017-03-14 | End: 2017-03-14

## 2017-03-14 RX ORDER — LIDOCAINE HCL/PF 100 MG/5ML
SYRINGE (ML) INTRAVENOUS
Status: DISCONTINUED | OUTPATIENT
Start: 2017-03-14 | End: 2017-03-14

## 2017-03-14 RX ADMIN — PHENYLEPHRINE HYDROCHLORIDE 50 MCG: 10 INJECTION, SOLUTION INTRAMUSCULAR; INTRAVENOUS; SUBCUTANEOUS at 09:03

## 2017-03-14 RX ADMIN — GLYCOPYRROLATE 0.2 MG: 0.2 INJECTION, SOLUTION INTRAMUSCULAR; INTRAVENOUS at 09:03

## 2017-03-14 RX ADMIN — EPHEDRINE SULFATE 10 MG: 50 INJECTION, SOLUTION INTRAMUSCULAR; INTRAVENOUS; SUBCUTANEOUS at 10:03

## 2017-03-14 RX ADMIN — DEXTROSE 900 MG: 50 INJECTION, SOLUTION INTRAVENOUS at 09:03

## 2017-03-14 RX ADMIN — CELECOXIB 200 MG: 200 CAPSULE ORAL at 08:03

## 2017-03-14 RX ADMIN — KETAMINE HYDROCHLORIDE 25 MG: 100 INJECTION, SOLUTION, CONCENTRATE INTRAMUSCULAR; INTRAVENOUS at 10:03

## 2017-03-14 RX ADMIN — FENTANYL CITRATE 25 MCG: 50 INJECTION, SOLUTION INTRAMUSCULAR; INTRAVENOUS at 10:03

## 2017-03-14 RX ADMIN — PHENYLEPHRINE HYDROCHLORIDE 50 MCG: 10 INJECTION, SOLUTION INTRAMUSCULAR; INTRAVENOUS; SUBCUTANEOUS at 10:03

## 2017-03-14 RX ADMIN — ACETAMINOPHEN 1000 MG: 10 INJECTION, SOLUTION INTRAVENOUS at 09:03

## 2017-03-14 RX ADMIN — PHENYLEPHRINE HYDROCHLORIDE 100 MCG: 10 INJECTION, SOLUTION INTRAMUSCULAR; INTRAVENOUS; SUBCUTANEOUS at 09:03

## 2017-03-14 RX ADMIN — TRANEXAMIC ACID 700 MG: 100 INJECTION, SOLUTION INTRAVENOUS at 09:03

## 2017-03-14 RX ADMIN — TRANEXAMIC ACID 700 MG: 100 INJECTION, SOLUTION INTRAVENOUS at 11:03

## 2017-03-14 RX ADMIN — MIDAZOLAM HYDROCHLORIDE 2 MG: 1 INJECTION, SOLUTION INTRAMUSCULAR; INTRAVENOUS at 09:03

## 2017-03-14 RX ADMIN — PROPOFOL 80 MCG/KG/MIN: 10 INJECTION, EMULSION INTRAVENOUS at 09:03

## 2017-03-14 RX ADMIN — PROPOFOL 50 MG: 10 INJECTION, EMULSION INTRAVENOUS at 09:03

## 2017-03-14 RX ADMIN — OXYCODONE HYDROCHLORIDE 10 MG: 10 TABLET, FILM COATED, EXTENDED RELEASE ORAL at 08:03

## 2017-03-14 RX ADMIN — LIDOCAINE HYDROCHLORIDE 75 SYRINGE: 20 INJECTION PARENTERAL at 09:03

## 2017-03-14 RX ADMIN — OXYCODONE HYDROCHLORIDE 10 MG: 5 TABLET ORAL at 01:03

## 2017-03-14 RX ADMIN — KETAMINE HYDROCHLORIDE 25 MG: 100 INJECTION, SOLUTION, CONCENTRATE INTRAMUSCULAR; INTRAVENOUS at 09:03

## 2017-03-14 RX ADMIN — GABAPENTIN 600 MG: 300 CAPSULE ORAL at 08:03

## 2017-03-14 RX ADMIN — FENTANYL CITRATE 50 MCG: 50 INJECTION, SOLUTION INTRAMUSCULAR; INTRAVENOUS at 09:03

## 2017-03-14 RX ADMIN — SODIUM CHLORIDE, SODIUM LACTATE, POTASSIUM CHLORIDE, AND CALCIUM CHLORIDE: .6; .31; .03; .02 INJECTION, SOLUTION INTRAVENOUS at 08:03

## 2017-03-14 RX ADMIN — SODIUM CHLORIDE, SODIUM LACTATE, POTASSIUM CHLORIDE, AND CALCIUM CHLORIDE: .6; .31; .03; .02 INJECTION, SOLUTION INTRAVENOUS at 09:03

## 2017-03-14 RX ADMIN — SODIUM CHLORIDE, SODIUM LACTATE, POTASSIUM CHLORIDE, AND CALCIUM CHLORIDE: .6; .31; .03; .02 INJECTION, SOLUTION INTRAVENOUS at 10:03

## 2017-03-14 RX ADMIN — ONDANSETRON 4 MG: 2 INJECTION, SOLUTION INTRAMUSCULAR; INTRAVENOUS at 09:03

## 2017-03-14 RX ADMIN — LEVALBUTEROL 1.25 MG: 1.25 SOLUTION, CONCENTRATE RESPIRATORY (INHALATION) at 08:03

## 2017-03-14 RX ADMIN — DEXAMETHASONE SODIUM PHOSPHATE 8 MG: 4 INJECTION, SOLUTION INTRAMUSCULAR; INTRAVENOUS at 08:03

## 2017-03-14 NOTE — TRANSFER OF CARE
"Anesthesia Transfer of Care Note    Patient: Abbey Hagan    Procedure(s) Performed: Procedure(s) (LRB):  ARTHROPLASTY-HIP (Right)    Patient location: PACU    Anesthesia Type: MAC and regional    Transport from OR: Transported from OR on room air with adequate spontaneous ventilation    Post pain: adequate analgesia    Post assessment: no apparent anesthetic complications and tolerated procedure well    Post vital signs: stable    Level of consciousness: awake    Nausea/Vomiting: no nausea/vomiting    Complications: none          Last vitals:   Visit Vitals    BP 99/61 (BP Location: Right arm, Patient Position: Lying, BP Method: Automatic)    Pulse (!) 58    Temp 36.4 °C (97.5 °F) (Skin)    Resp 18    Ht 5' 9" (1.753 m)    Wt 68 kg (150 lb)    LMP 12/06/1988    SpO2 97%    Breastfeeding No    BMI 22.15 kg/m2     "

## 2017-03-14 NOTE — IP AVS SNAPSHOT
[facility]  [address]  [place] [address]  Phone: [phone]           Patient Discharge Instructions     Our goal is to set you up for success. This packet includes information on your condition, medications, and your home care. It will help you to care for yourself so you don't get sicker and need to go back to the hospital.     Please ask your nurse if you have any questions.        There are many details to remember when preparing to leave the hospital. Here is what you will need to do:    1. Take your medicine. If you are prescribed medications, review your Medication List in the following pages. You may have new medications to  at the pharmacy and others that you'll need to stop taking. Review the instructions for how and when to take your medications. Talk with your doctor or nurses if you are unsure of what to do.     2. Go to your follow-up appointments. Specific follow-up information is listed in the following pages. Your may be contacted by a transition nurse or clinical provider about future appointments. Be sure we have all of the phone numbers to reach you, if needed. Please contact your provider's office if you are unable to make an appointment.     3. Watch for warning signs. Your doctor or nurse will give you detailed warning signs to watch for and when to call for assistance. These instructions may also include educational information about your condition. If you experience any of warning signs to your health, call your doctor.               [facility]  Unless otherwise directed by your provider, please contact [facility], our nurse care line that is available for 24/7 assistance.     [phone] (toll-free)    Registered nurses in the [facility] provide clinical advisement, health education, appointment booking, and other advisory services.                    ** Verify the list of medication(s) below is accurate and up  to date. Carry this with you in case of emergency. If your medications have changed, please notify your healthcare provider.             Medication List      ASK your doctor about these medications        Additional Info                      acyclovir 5% 5 % Crea   Commonly known as:  ZOVIRAX   Quantity:  2 g   Refills:  3    Instructions:  1 Application(s) Topical PRN Twice a day.     Begin Date    AM    Noon    PM    Bedtime       biotin 1 mg tablet   Refills:  0   Dose:  1000 mcg    Instructions:  Take 1,000 mcg by mouth 3 (three) times daily.     Begin Date    AM    Noon    PM    Bedtime       escitalopram oxalate 20 MG tablet   Commonly known as:  LEXAPRO   Quantity:  30 tablet   Refills:  0    Instructions:  TAKE 1 TABLET BY MOUTH EVERY MORNING     Begin Date    AM    Noon    PM    Bedtime       ibuprofen 800 MG tablet   Commonly known as:  ADVIL,MOTRIN   Quantity:  30 tablet   Refills:  3   Dose:  800 mg    Instructions:  Take 1 tablet (800 mg total) by mouth 3 (three) times daily.     Begin Date    AM    Noon    PM    Bedtime       lisinopril-hydrochlorothiazide 20-25 mg Tab   Commonly known as:  PRINZIDE,ZESTORETIC   Quantity:  30 tablet   Refills:  6    Instructions:  TAKE 1/2 TABLET BY MOUTH EVERY MORNING     Begin Date    AM    Noon    PM    Bedtime       MAGNESIUM-POTASSIUM ORAL   Refills:  0   Dose:  1 tablet    Instructions:  Take 1 tablet by mouth once daily. Every day     Begin Date    AM    Noon    PM    Bedtime       meloxicam 7.5 MG tablet   Commonly known as:  MOBIC   Quantity:  30 tablet   Refills:  0    Instructions:  TAKE 1 TABLET(7.5 MG) BY MOUTH EVERY DAY     Begin Date    AM    Noon    PM    Bedtime       oxycodone-acetaminophen 5-325 mg per tablet   Commonly known as:  PERCOCET   Quantity:  47 tablet   Refills:  0   Dose:  1 tablet    Instructions:  Take 1 tablet by mouth every 4 to 6 hours as needed for Pain.     Begin Date    AM    Noon    PM    Bedtime       VENTOLIN HFA 90  mcg/actuation inhaler   Quantity:  18 g   Refills:  0   Generic drug:  albuterol    Instructions:  INHALE 2 PUFFS INTO THE LUNGS EVERY 6 HOURS AS NEEDED FOR WHEEZING     Begin Date    AM    Noon    PM    Bedtime       VITAMIN D-3 ORAL   Refills:  0    Instructions:  No Sig Provided     Begin Date    AM    Noon    PM    Bedtime       warfarin 5 MG tablet   Commonly known as:  COUMADIN   Quantity:  30 tablet   Refills:  1   Dose:  5 mg    Instructions:  Take 1 tablet (5 mg total) by mouth Daily. Start night before surgery     Begin Date    AM    Noon    PM    Bedtime                  Please bring to all follow up appointments:    1. A copy of your discharge instructions.  2. All medicines you are currently taking in their original bottles.  3. Identification and insurance card.    Please arrive 15 minutes ahead of scheduled appointment time.    Please call 24 hours in advance if you must reschedule your appointment and/or time.        Your Scheduled Appointments     Mar 29, 2017 11:15 AM CDT   Post OP with Sandip Sorto MD   [facility]    [address] [place] [address]   [phone]            Aug 17, 2017  4:20 PM CDT   Established Patient Visit with Daryl Garcia MD   [place] [facility]    [address] [facility] [address]   [phone]                Discharge Instructions     Future Orders    Call MD for:  difficulty breathing, headache or visual disturbances     Call MD for:  extreme fatigue     Call MD for:  hives     Call MD for:  persistent dizziness or light-headedness     Call MD for:  persistent nausea and vomiting     Call MD for:  redness, tenderness, or signs of infection (pain, swelling, redness, odor or green/yellow discharge around incision site)     Call MD for:  severe uncontrolled pain     Call MD for:  temperature >100.4     Diet general     Questions:    Total calories:      Fat restriction, if any:      Protein restriction,  if any:      Na restriction, if any:      Fluid restriction:      Additional restrictions:      Remove dressing in 48 hours     Weight bearing restrictions (specify)         Discharge Instructions             After surgery until first follow-up visit:    DOS:   Keep leg elevated (toes above your nose) for several hours per day while recovering from surgery.   Toe touch with walker.    Home health nurse will see you tomorrow and perform dressing changes   Minimal activity and advance diet as tolerated   Keep operative site clean and dry   Resume home medications today including coumadin    DONT:   Do not soak in tub.   No driving for 24 hours or while taking narcotic pain medication   DO NOT TAKE ADDITIONAL TYLENOL/ACETAMINOPHEN WHILE TAKING NARCOTIC PAIN MEDICATION THAT CONTAINS TYLENOL/ACETAMINOPHEN.    CALL PHYSICIAN FOR ANY QUESTIONS OR CONCERNS.    Make return appointment for 2 weeks to have stitches removed    FOR EMERGENCIES:  Contact your doctor at [phone]      Discharge Instructions: After Your Surgery  Youve just had surgery. During surgery you were given medicine called anesthesia to keep you relaxed and free of pain. After surgery you may have some pain or nausea. This is common. Here are some tips for feeling better and getting well after surgery.     Stay on schedule with your medication.   Going home  Your doctor or nurse will show you how to take care of yourself when you go home. He or she will also answer your questions. Have an adult family member or friend drive you home. For the first 24 hours after your surgery:  · Do not drive or use heavy equipment.  · Do not make important decisions or sign legal papers.  · Do not drink alcohol.  · Have someone stay with you, if needed. He or she can watch for problems and help keep you safe.  Be sure to go to all follow-up visits with your doctor. And rest after your surgery for as long as your doctor tells you to.  Coping with pain  If you  have pain after surgery, pain medicine will help you feel better. Take it as told, before pain becomes severe. Also, ask your doctor or pharmacist about other ways to control pain. This might be with heat, ice, or relaxation. And follow any other instructions your surgeon or nurse gives you.  Tips for taking pain medicine  To get the best relief possible, remember these points:  · Pain medicines can upset your stomach. Taking them with a little food may help.  · Most pain relievers taken by mouth need at least 20 to 30 minutes to start to work.  · Taking medicine on a schedule can help you remember to take it. Try to time your medicine so that you can take it before starting an activity. This might be before you get dressed, go for a walk, or sit down for dinner.  · Constipation is a common side effect of pain medicines. Call your doctor before taking any medicines such as laxatives or stool softeners to help ease constipation. Also ask if you should skip any foods. Drinking lots of fluids and eating foods such as fruits and vegetables that are high in fiber can also help. Remember, do not take laxatives unless your surgeon has prescribed them.  · Drinking alcohol and taking pain medicine can cause dizziness and slow your breathing. It can even be deadly. Do not drink alcohol while taking pain medicine.  · Pain medicine can make you react more slowly to things. Do not drive or run machinery while taking pain medicine.  Your health care provider may tell you to take acetaminophen to help ease your pain. Ask him or her how much you are supposed to take each day. Acetaminophen or other pain relievers may interact with your prescription medicines or other over-the-counter (OTC) drugs. Some prescription medicines have acetaminophen and other ingredients. Using both prescription and OTC acetaminophen for pain can cause you to overdose. Read the labels on your OTC medicines with care. This will help you to clearly know the  list of ingredients, how much to take, and any warnings. It may also help you not take too much acetaminophen. If you have questions or do not understand the information, ask your pharmacist or health care provider to explain it to you before you take the OTC medicine.  Managing nausea  Some people have an upset stomach after surgery. This is often because of anesthesia, pain, or pain medicine, or the stress of surgery. These tips will help you handle nausea and eat healthy foods as you get better. If you were on a special food plan before surgery, ask your doctor if you should follow it while you get better. These tips may help:  · Do not push yourself to eat. Your body will tell you when to eat and how much.  · Start off with clear liquids and soup. They are easier to digest.  · Next try semi-solid foods, such as mashed potatoes, applesauce, and gelatin, as you feel ready.  · Slowly move to solid foods. Dont eat fatty, rich, or spicy foods at first.  · Do not force yourself to have 3 large meals a day. Instead eat smaller amounts more often.  · Take pain medicines with a small amount of solid food, such as crackers or toast, to avoid nausea.     Call your surgeon if  · You still have pain an hour after taking medicine. The medicine may not be strong enough.  · You feel too sleepy, dizzy, or groggy. The medicine may be too strong.  · You have side effects like nausea, vomiting, or skin changes, such as rash, itching, or hives.       If you have obstructive sleep apnea  You were given anesthesia medicine during surgery to keep you comfortable and free of pain. After surgery, you may have more apnea spells because of this medicine and other medicines you were given. The spells may last longer than usual.   At home:  · Keep using the continuous positive airway pressure (CPAP) device when you sleep. Unless your health care provider tells you not to, use it when you sleep, day or night. CPAP is a common device used to  "treat obstructive sleep apnea.  · Talk with your provider before taking any pain medicine, muscle relaxants, or sedatives. Your provider will tell you about the possible dangers of taking these medicines.  Date Last Reviewed: 10/16/2014  © 9517-2942 [employer]. [address] [place] [address]. All rights reserved. This information is not intended as a substitute for professional medical care. Always follow your healthcare professional's instructions.            Primary Diagnosis     Your primary diagnosis was:  Osteoarthritis (Arthritis Due To Wear And Tear Of Joints)      Admission Information     Date & Time Provider Department CSN    3/14/2017  7:37 AM Sandip Sorto MD [facility] [id number]      Care Providers     Provider Role Specialty Primary office phone    Sandip Sorto MD Attending Provider Sports Medicine [phone]    Sandip Sorto MD Surgeon  Sports Medicine [phone]      Your Vitals Were     BP Pulse Temp Resp Height Weight    105/61 70 97.9 °F (36.6 °C) (Skin) 30 5' 9" (1.753 m) 68 kg (150 lb)    Last Period SpO2 BMI          12/06/1988 97% 22.15 kg/m2        Recent Lab Values        4/22/2013 8/15/2014                        8:05 AM 11:14 AM          A1C 6.0 5.8                     Allergies as of 3/14/2017        Reactions    Penicillin G     Childhood allergy--unknown reaction    Sulfa (Sulfonamide Antibiotics)     Childhood allergy--unknown reaction      Advance Directives     An advance directive is a document which, in the event you are no longer able to make decisions for yourself, tells your healthcare team what kind of treatment you do or do not want to receive, or who you would like to make those decisions for you.  If you do not currently have an advance directive, [facility] encourages you to create one.  For more information call:  [phone] ([phone]), [phone] ([phone]),  or log on to [email or web address].      "   Language Assistance Services     ATTENTION: Language assistance services are available, free of charge. Please call [phone].      ATENCIÓN: Si habla naeem, tiene a panda disposición servicios gratuitos de asistencia lingüística. Llame al [phone].     CHÚ Ý: N?u b?n nói Ti?ng Vi?t, có các d?ch v? h? tr? ngôn ng? mi?n phí dành cho b?n. G?i s? [phone].         [facility] complies with applicable Federal civil rights laws and does not discriminate on the basis of race, color, national origin, age, disability, or sex.

## 2017-03-14 NOTE — ANESTHESIA POSTPROCEDURE EVALUATION
"Anesthesia Post Evaluation    Patient: Abbey Hagan    Procedure(s) Performed: Procedure(s) (LRB):  ARTHROPLASTY-HIP (Right)    Final Anesthesia Type: spinal  Patient location during evaluation: PACU  Patient participation: Yes- Able to Participate  Level of consciousness: awake and alert and oriented  Post-procedure vital signs: reviewed and stable  Pain management: adequate  Airway patency: patent  PONV status at discharge: No PONV  Anesthetic complications: no      Cardiovascular status: hemodynamically stable  Respiratory status: unassisted, spontaneous ventilation and room air  Hydration status: euvolemic  Follow-up not needed.        Visit Vitals    /61    Pulse 70    Temp 36.5 °C (97.7 °F) (Skin)    Resp (!) 30    Ht 5' 9" (1.753 m)    Wt 68 kg (150 lb)    LMP 12/06/1988    SpO2 96%    Breastfeeding No    BMI 22.15 kg/m2       Pain/Hector Score: Pain Assessment Performed: Yes (3/14/2017  8:05 AM)  Presence of Pain: denies (3/14/2017 12:40 PM)  Pain Rating Prior to Med Admin: 8 (3/14/2017  8:51 AM)  Hector Score: 9 (3/14/2017 11:29 AM)      "

## 2017-03-14 NOTE — DISCHARGE INSTRUCTIONS
After surgery until first follow-up visit:    DOS:   Keep leg elevated (toes above your nose) for several hours per day while recovering from surgery.   Toe touch with walker.    Home health nurse will see you tomorrow and perform dressing changes   Minimal activity and advance diet as tolerated   Keep operative site clean and dry   Resume home medications today including coumadin    DONT:   Do not soak in tub.   No driving for 24 hours or while taking narcotic pain medication   DO NOT TAKE ADDITIONAL TYLENOL/ACETAMINOPHEN WHILE TAKING NARCOTIC PAIN MEDICATION THAT CONTAINS TYLENOL/ACETAMINOPHEN.    CALL PHYSICIAN FOR ANY QUESTIONS OR CONCERNS.    Make return appointment for 2 weeks to have stitches removed    FOR EMERGENCIES:  Contact your doctor at [phone]      Discharge Instructions: After Your Surgery  Youve just had surgery. During surgery you were given medicine called anesthesia to keep you relaxed and free of pain. After surgery you may have some pain or nausea. This is common. Here are some tips for feeling better and getting well after surgery.     Stay on schedule with your medication.   Going home  Your doctor or nurse will show you how to take care of yourself when you go home. He or she will also answer your questions. Have an adult family member or friend drive you home. For the first 24 hours after your surgery:  · Do not drive or use heavy equipment.  · Do not make important decisions or sign legal papers.  · Do not drink alcohol.  · Have someone stay with you, if needed. He or she can watch for problems and help keep you safe.  Be sure to go to all follow-up visits with your doctor. And rest after your surgery for as long as your doctor tells you to.  Coping with pain  If you have pain after surgery, pain medicine will help you feel better. Take it as told, before pain becomes severe. Also, ask your doctor or pharmacist about other ways to control pain. This might be with  heat, ice, or relaxation. And follow any other instructions your surgeon or nurse gives you.  Tips for taking pain medicine  To get the best relief possible, remember these points:  · Pain medicines can upset your stomach. Taking them with a little food may help.  · Most pain relievers taken by mouth need at least 20 to 30 minutes to start to work.  · Taking medicine on a schedule can help you remember to take it. Try to time your medicine so that you can take it before starting an activity. This might be before you get dressed, go for a walk, or sit down for dinner.  · Constipation is a common side effect of pain medicines. Call your doctor before taking any medicines such as laxatives or stool softeners to help ease constipation. Also ask if you should skip any foods. Drinking lots of fluids and eating foods such as fruits and vegetables that are high in fiber can also help. Remember, do not take laxatives unless your surgeon has prescribed them.  · Drinking alcohol and taking pain medicine can cause dizziness and slow your breathing. It can even be deadly. Do not drink alcohol while taking pain medicine.  · Pain medicine can make you react more slowly to things. Do not drive or run machinery while taking pain medicine.  Your health care provider may tell you to take acetaminophen to help ease your pain. Ask him or her how much you are supposed to take each day. Acetaminophen or other pain relievers may interact with your prescription medicines or other over-the-counter (OTC) drugs. Some prescription medicines have acetaminophen and other ingredients. Using both prescription and OTC acetaminophen for pain can cause you to overdose. Read the labels on your OTC medicines with care. This will help you to clearly know the list of ingredients, how much to take, and any warnings. It may also help you not take too much acetaminophen. If you have questions or do not understand the information, ask your pharmacist or  health care provider to explain it to you before you take the OTC medicine.  Managing nausea  Some people have an upset stomach after surgery. This is often because of anesthesia, pain, or pain medicine, or the stress of surgery. These tips will help you handle nausea and eat healthy foods as you get better. If you were on a special food plan before surgery, ask your doctor if you should follow it while you get better. These tips may help:  · Do not push yourself to eat. Your body will tell you when to eat and how much.  · Start off with clear liquids and soup. They are easier to digest.  · Next try semi-solid foods, such as mashed potatoes, applesauce, and gelatin, as you feel ready.  · Slowly move to solid foods. Dont eat fatty, rich, or spicy foods at first.  · Do not force yourself to have 3 large meals a day. Instead eat smaller amounts more often.  · Take pain medicines with a small amount of solid food, such as crackers or toast, to avoid nausea.     Call your surgeon if  · You still have pain an hour after taking medicine. The medicine may not be strong enough.  · You feel too sleepy, dizzy, or groggy. The medicine may be too strong.  · You have side effects like nausea, vomiting, or skin changes, such as rash, itching, or hives.       If you have obstructive sleep apnea  You were given anesthesia medicine during surgery to keep you comfortable and free of pain. After surgery, you may have more apnea spells because of this medicine and other medicines you were given. The spells may last longer than usual.   At home:  · Keep using the continuous positive airway pressure (CPAP) device when you sleep. Unless your health care provider tells you not to, use it when you sleep, day or night. CPAP is a common device used to treat obstructive sleep apnea.  · Talk with your provider before taking any pain medicine, muscle relaxants, or sedatives. Your provider will tell you about the possible dangers of taking these  medicines.  Date Last Reviewed: 10/16/2014  © 0318-0813 [employer]. [address] [place] [address], [place] [address]. All rights reserved. This information is not intended as a substitute for professional medical care. Always follow your healthcare professional's instructions.

## 2017-03-14 NOTE — ANESTHESIA PROCEDURE NOTES
Spinal    Patient location during procedure: OR  Start time: 3/14/2017 9:18 AM  Timeout: 3/14/2017 9:18 AM  End time: 3/14/2017 9:28 AM  Staffing  Anesthesiologist: [facility]  Performed by: anesthesiologist   Preanesthetic Checklist  Completed: patient identified, site marked, surgical consent, pre-op evaluation, timeout performed, IV checked, risks and benefits discussed and monitors and equipment checked  Spinal Block  Patient position: sitting  Prep: ChloraPrep  Patient monitoring: heart rate, cardiac monitor and continuous pulse ox  Approach: midline  Location: L2-3 (1st attempt L3-4, second attempt L2-3)  Injection technique: single shot  CSF Fluid: clear free-flowing CSF  Needle  Needle type: pencil-tip   Needle gauge: 25 G  Needle length: 3.5 in  Additional Documentation: incremental injection and no paresthesia on injection  Needle localization: anatomical landmarks  Assessment  Sensory level: T10   Dermatomal levels determined by pinch or prick  Ease of block: easy  Patient's tolerance of the procedure: comfortable throughout block and no complaints  Medications:  Bolus administered: 3.5 mL of 1.5 mepivacaine

## 2017-03-14 NOTE — ANESTHESIA PREPROCEDURE EVALUATION
03/14/2017  Abbey Hagan is a 61 y.o., female.    [facility] Anesthesia Evaluation      I have reviewed the Medications.     Review of Systems  Anesthesia Hx:  No problems with previous Anesthesia    Social:  Smoker    Cardiovascular:   Hypertension    Pulmonary:   COPD (no home O2)    Renal/:  Renal/ Normal     Hepatic/GI:  Hepatic/GI Normal    Neurological:  Neurology Normal    Endocrine:  Endocrine Normal    Psych:   anxiety (needle phobia) depression          Physical Exam  General:  Well nourished    Airway/Jaw/Neck:  Airway Findings: Mouth Opening: Normal Tongue: Normal  General Airway Assessment: Adult, Average  Mallampati: II  Jaw/Neck Findings:  Neck ROM: Normal ROM       Chest/Lungs:  Chest/Lungs Findings: Normal Respiratory Rate, Expiratory Wheezes, Mild, Rhonchi     Heart/Vascular:  Heart Findings: Rate: Normal  Rhythm: Regular Rhythm  Sounds: Normal  Heart murmur: negative       Mental Status:  Mental Status Findings:  Cooperative, Alert and Oriented, Anxious         Anesthesia Plan  Type of Anesthesia, risks & benefits discussed:  Anesthesia Type:  spinal  Patient's Preference:   Intra-op Monitoring Plan:   Intra-op Monitoring Plan Comments:   Post Op Pain Control Plan:   Post Op Pain Control Plan Comments:   Induction:    Beta Blocker:  Patient is not currently on a Beta-Blocker (No further documentation required).       Informed Consent: Patient understands risks and agrees with Anesthesia plan.  Questions answered. Anesthesia consent signed with patient.  ASA Score: 2     Day of Surgery Review of History & Physical:        Anesthesia Plan Notes: Preop Xopenex treatment given.        Ready For Surgery From Anesthesia Perspective.

## 2017-03-14 NOTE — PLAN OF CARE
Patient tolerating oral liquids without difficulty. No apparent s&s of distress noted at this time. Pt states pain tolerable at 3-4 to right hip. Dressing C,D,I. Physical therapist initiated therapy with pt and instructed on walker use. Discharge instructions reviewed with patient/family/friend with good verbal feedback received. Patient ready for discharge

## 2017-03-14 NOTE — OP NOTE
DATE OF PROCEDURE:  03/14/2017.    PREOPERATIVE DIAGNOSIS:  Right hip arthrosis.    POSTOPERATIVE DIAGNOSIS:  Right hip arthrosis.    PROCEDURE:  Right total hip arthroplasty.    SURGEON:  Sandip Sorto M.D.    ASSISTANT:  Barber.    ANESTHESIA:  Regional.    BLOOD LOSS:  300 mL.    FLUIDS:  Crystalloid.    DRAINS:  None.    INDICATIONS FOR PROCEDURE:  Ms. Hagan is a 61-year-old female who has had   debilitating right-sided hip pain despite nonoperative measures.  If was felt   that she would benefit from total hip arthroplasty.    PROCEDURE IN DETAIL:  After obtaining informed consent and starting the patient   on preoperative IV antibiotics, the patient was taken back to the Operating   Room.  Regional anesthesia was performed by the Anesthesia team.  Narvaez catheter   was placed.  The patient was then positioned into the lateral decubitus   position and stabilized with a Stulberg hip positioner.  The right hip and lower   extremity were prepped and draped in a normal sterile fashion.  A longitudinal   incision made beginning in the greater trochanter and extending proximally   toward the PSIS for about 7.5 cm, cut down through thick dermal layer, cut down   through the thick adipose tissue layer, identified the fascia of the gluteus   dylan, and split this to length of the skin incision and spread bluntly to the   gluteus dylan fibers.  We then placed retractor deep to the gluteus medius   and retracted deep to the piriformis, elevated the minimus off the capsule, and   exposed the capsule superiorly with electrocautery.  We then placed Hohmann   retractors deep to the capsule.  We then went ahead and opened up the medullary   canal with a large drill bit and confirmed placement with a ball-tipped   guidewire.  We sequentially reamed up to a size 12.  We used a cookie cutter and   used an osteotome to make a cut in the femoral head to accommodate our   broaches.  We broached up to a size 12 following the  native version of the canal   about 15 degrees.  We then went ahead and made a femoral neck cut off the 11   broach, removed the femoral head, sized to 48, and replaced with a 12 size   broach.  We then placed an outrigger device to place the cannula posterior to   the femur to assist with reaming.  We then sequentially reamed beginning with a   48, went up to a 52, and about 45 degrees of abduction and about 25 degrees of   anteversion.  We then trialed different head and neck length combinations and   felt that a short varus neck with a short head restored leg lengths and had   excellent stability in both flexion and extension.  Intraoperative films were   obtained to confirm this.  We then removed the trial components.  We locked in   our final liner into the 52 cup that was placed in 45 degrees of abduction and   about 25 degrees of anteversion.  A single supplemental screw in the superior   quadrant length of 30 had been placed into the cup for supplemental fixation.    We locked in our final liner.  We then replaced the broach with a monoblock size   12 short varus neck stem and made this to a ceramic head in situ 36 mm with a   short length.  Again, we had restoration of leg lengths and excellent stability   in both flexion and extension.  Intraoperative films were obtained to confirm   this.  It did not dislocate until it was fully flexed and internally rotated to   well beyond 60 degrees.  It was stable in extension and external rotation as   well, exceedingly stable hip.  We were satisfied with our films.  We irrigated   the wound and closed the fascia of the gluteus dylan with 1 Vicryl   figure-of-eight suture.  Subcutaneous tissue was closed with 2-0 Vicryl.    Subcuticular layer was closed with a running 3-0 Prolene.  Sterile dressing was   applied.      BHARATI/CHANDAN  dd: 03/14/2017 11:18:24 (CDT)  td: 03/14/2017 16:42:54 (CD)  Doc ID   #[id number]  Job ID #[id number]    CC:

## 2017-03-15 ENCOUNTER — ANTI-COAG VISIT (OUTPATIENT)
Dept: CARDIOLOGY | Facility: CLINIC | Age: 62
End: 2017-03-15

## 2017-03-15 VITALS
WEIGHT: 150 LBS | HEIGHT: 69 IN | RESPIRATION RATE: 16 BRPM | BODY MASS INDEX: 22.22 KG/M2 | OXYGEN SATURATION: 95 % | SYSTOLIC BLOOD PRESSURE: 116 MMHG | DIASTOLIC BLOOD PRESSURE: 66 MMHG | HEART RATE: 85 BPM | TEMPERATURE: 98 F

## 2017-03-15 LAB — INR PPP: 1.2

## 2017-03-15 NOTE — DISCHARGE SUMMARY
Discharge Note  Short Stay      SUMMARY     Admit Date: 3/14/2017    Attending Physician: No att. providers found     Discharge Physician: No att. providers found    Discharge Date: 3/15/2017 9:11 AM    Final Diagnosis: Primary osteoarthritis of right hip [M16.11]    Hospital Course: Outpatient Surgery    Disposition: Home or Self Care    Patient Instructions:   Discharge Medication List as of 3/14/2017  2:40 PM      CONTINUE these medications which have NOT CHANGED    Details   acyclovir 5% (ZOVIRAX) 5 % Crea 1 Application(s) Topical PRN Twice a day., Normal      biotin 1 mg tablet Take 1,000 mcg by mouth 3 (three) times daily.  , Until Discontinued, Historical Med      CHOLECALCIFEROL, VITAMIN D3, (VITAMIN D-3 ORAL) No Sig Provided, Starting 11/23/2011, Until Discontinued, Historical Med      escitalopram oxalate (LEXAPRO) 20 MG tablet TAKE 1 TABLET BY MOUTH EVERY MORNING, Normal      lisinopril-hydrochlorothiazide (PRINZIDE,ZESTORETIC) 20-25 mg Tab TAKE 1/2 TABLET BY MOUTH EVERY MORNING, Normal      meloxicam (MOBIC) 7.5 MG tablet TAKE 1 TABLET(7.5 MG) BY MOUTH EVERY DAY, Normal      POTASSIUM/MAGNESIUM (MAGNESIUM-POTASSIUM ORAL) Take 1 tablet by mouth once daily. Every day, Starting 11/23/2011, Until Discontinued, Historical Med      VENTOLIN HFA 90 mcg/actuation inhaler INHALE 2 PUFFS INTO THE LUNGS EVERY 6 HOURS AS NEEDED FOR WHEEZING, Normal      warfarin (COUMADIN) 5 MG tablet Take 1 tablet (5 mg total) by mouth Daily. Start night before surgery, Starting 2/13/2017, Until Tue 2/13/18, Normal      ibuprofen (ADVIL,MOTRIN) 800 MG tablet Take 1 tablet (800 mg total) by mouth 3 (three) times daily., Starting 2/13/2017, Until Discontinued, Normal      oxycodone-acetaminophen (PERCOCET) 5-325 mg per tablet Take 1 tablet by mouth every 4 to 6 hours as needed for Pain., Starting 2/13/2017, Until Discontinued, Print             Discharge Procedure Orders (must include Diet, Follow-up, Activity):    Discharge  Procedure Orders (must include Diet, Follow-up, Activity)  Diet general     Call MD for:  temperature >100.4     Call MD for:  persistent nausea and vomiting     Call MD for:  severe uncontrolled pain     Call MD for:  difficulty breathing, headache or visual disturbances     Call MD for:  redness, tenderness, or signs of infection (pain, swelling, redness, odor or green/yellow discharge around incision site)     Call MD for:  hives     Call MD for:  persistent dizziness or light-headedness     Call MD for:  extreme fatigue     Remove dressing in 48 hours     Weight bearing restrictions (specify)          Follow Up:  Follow up as scheduled.  Resume routine diet.  Activity as tolerated.    No driving day of procedure.

## 2017-03-16 ENCOUNTER — TELEPHONE (OUTPATIENT)
Dept: ORTHOPEDICS | Facility: CLINIC | Age: 62
End: 2017-03-16

## 2017-03-19 DIAGNOSIS — J44.9 CHRONIC OBSTRUCTIVE PULMONARY DISEASE, UNSPECIFIED COPD TYPE: Chronic | ICD-10-CM

## 2017-03-20 ENCOUNTER — ANTI-COAG VISIT (OUTPATIENT)
Dept: CARDIOLOGY | Facility: CLINIC | Age: 62
End: 2017-03-20

## 2017-03-20 LAB — INR PPP: 1.5

## 2017-03-20 RX ORDER — ALBUTEROL SULFATE 90 UG/1
AEROSOL, METERED RESPIRATORY (INHALATION)
Qty: 18 G | Refills: 0 | Status: SHIPPED | OUTPATIENT
Start: 2017-03-20 | End: 2017-06-28 | Stop reason: SDUPTHER

## 2017-03-23 ENCOUNTER — ANTI-COAG VISIT (OUTPATIENT)
Dept: CARDIOLOGY | Facility: CLINIC | Age: 62
End: 2017-03-23

## 2017-03-23 LAB — INR PPP: 2.1

## 2017-03-27 RX ORDER — ESCITALOPRAM OXALATE 20 MG/1
TABLET ORAL
Qty: 30 TABLET | Refills: 0 | Status: SHIPPED | OUTPATIENT
Start: 2017-03-27 | End: 2017-06-28 | Stop reason: SDUPTHER

## 2017-03-29 ENCOUNTER — OFFICE VISIT (OUTPATIENT)
Dept: ORTHOPEDICS | Facility: CLINIC | Age: 62
End: 2017-03-29
Payer: COMMERCIAL

## 2017-03-29 VITALS — WEIGHT: 150 LBS | HEIGHT: 69 IN | BODY MASS INDEX: 22.22 KG/M2

## 2017-03-29 DIAGNOSIS — Z96.641 STATUS POST TOTAL REPLACEMENT OF RIGHT HIP: Primary | ICD-10-CM

## 2017-03-29 PROCEDURE — 99999 PR PBB SHADOW E&M-EST. PATIENT-LVL III: CPT | Mod: PBBFAC,,, | Performed by: ORTHOPAEDIC SURGERY

## 2017-03-29 PROCEDURE — 99024 POSTOP FOLLOW-UP VISIT: CPT | Mod: S$GLB,,, | Performed by: ORTHOPAEDIC SURGERY

## 2017-03-29 RX ORDER — HYDROCODONE BITARTRATE AND ACETAMINOPHEN 5; 325 MG/1; MG/1
1 TABLET ORAL EVERY 6 HOURS PRN
Qty: 37 TABLET | Refills: 0
Start: 2017-03-29 | End: 2017-06-28 | Stop reason: SDDI

## 2017-03-30 ENCOUNTER — ANTI-COAG VISIT (OUTPATIENT)
Dept: CARDIOLOGY | Facility: CLINIC | Age: 62
End: 2017-03-30

## 2017-03-30 LAB — INR PPP: 2.4

## 2017-04-06 ENCOUNTER — PATIENT MESSAGE (OUTPATIENT)
Dept: ORTHOPEDICS | Facility: CLINIC | Age: 62
End: 2017-04-06

## 2017-04-06 ENCOUNTER — ANTI-COAG VISIT (OUTPATIENT)
Dept: CARDIOLOGY | Facility: CLINIC | Age: 62
End: 2017-04-06

## 2017-04-06 LAB — INR PPP: 3.6

## 2017-04-18 ENCOUNTER — ANTI-COAG VISIT (OUTPATIENT)
Dept: CARDIOLOGY | Facility: CLINIC | Age: 62
End: 2017-04-18
Payer: COMMERCIAL

## 2017-04-18 DIAGNOSIS — Z79.01 LONG TERM (CURRENT) USE OF ANTICOAGULANTS: Primary | ICD-10-CM

## 2017-04-18 DIAGNOSIS — Z79.01 PROPHYLACTIC USE OF WARFARIN FOR VENOUS THROMBOEMBOLISM: ICD-10-CM

## 2017-04-18 LAB — INR PPP: 2.7 (ref 2–3)

## 2017-04-18 PROCEDURE — 99211 OFF/OP EST MAY X REQ PHY/QHP: CPT | Mod: 25,S$GLB,,

## 2017-04-18 PROCEDURE — 85610 PROTHROMBIN TIME: CPT | Mod: QW,S$GLB,,

## 2017-04-18 NOTE — PROGRESS NOTES
Patient confirms dose and compliance. INR in range and stable. She will complete therapy on 4/25. Will discontinue warfarin at that time.

## 2017-04-26 DIAGNOSIS — Z96.641 STATUS POST TOTAL REPLACEMENT OF RIGHT HIP: Primary | ICD-10-CM

## 2017-04-27 ENCOUNTER — HOSPITAL ENCOUNTER (OUTPATIENT)
Dept: RADIOLOGY | Facility: HOSPITAL | Age: 62
Discharge: HOME | End: 2017-04-27
Attending: ORTHOPAEDIC SURGERY
Payer: COMMERCIAL

## 2017-04-27 ENCOUNTER — OFFICE VISIT (OUTPATIENT)
Dept: ORTHOPEDICS | Facility: CLINIC | Age: 62
End: 2017-04-27
Payer: COMMERCIAL

## 2017-04-27 VITALS — BODY MASS INDEX: 22.22 KG/M2 | WEIGHT: 150 LBS | HEIGHT: 69 IN

## 2017-04-27 DIAGNOSIS — Z96.641 STATUS POST TOTAL REPLACEMENT OF RIGHT HIP: Primary | ICD-10-CM

## 2017-04-27 DIAGNOSIS — Z96.641 STATUS POST TOTAL REPLACEMENT OF RIGHT HIP: ICD-10-CM

## 2017-04-27 PROCEDURE — 99999 PR PBB SHADOW E&M-EST. PATIENT-LVL III: CPT | Mod: PBBFAC,,, | Performed by: ORTHOPAEDIC SURGERY

## 2017-04-27 PROCEDURE — 73502 X-RAY EXAM HIP UNI 2-3 VIEWS: CPT | Mod: 26,,, | Performed by: RADIOLOGY

## 2017-04-27 PROCEDURE — 73502 X-RAY EXAM HIP UNI 2-3 VIEWS: CPT | Mod: TC,PO

## 2017-04-27 PROCEDURE — 99024 POSTOP FOLLOW-UP VISIT: CPT | Mod: S$GLB,,, | Performed by: ORTHOPAEDIC SURGERY

## 2017-06-28 DIAGNOSIS — J44.9 CHRONIC OBSTRUCTIVE PULMONARY DISEASE, UNSPECIFIED COPD TYPE: Chronic | ICD-10-CM

## 2017-06-28 DIAGNOSIS — M54.6 CHRONIC THORACIC BACK PAIN, UNSPECIFIED BACK PAIN LATERALITY: ICD-10-CM

## 2017-06-28 DIAGNOSIS — M51.36 DDD (DEGENERATIVE DISC DISEASE), LUMBAR: ICD-10-CM

## 2017-06-28 DIAGNOSIS — G89.29 CHRONIC THORACIC BACK PAIN, UNSPECIFIED BACK PAIN LATERALITY: ICD-10-CM

## 2017-06-28 DIAGNOSIS — M54.12 CERVICAL RADICULOPATHY: ICD-10-CM

## 2017-06-28 RX ORDER — MELOXICAM 7.5 MG/1
TABLET ORAL
Qty: 30 TABLET | Refills: 4 | Status: SHIPPED | OUTPATIENT
Start: 2017-06-28 | End: 2017-12-28 | Stop reason: SDUPTHER

## 2017-06-28 RX ORDER — ESCITALOPRAM OXALATE 20 MG/1
20 TABLET ORAL EVERY MORNING
Qty: 30 TABLET | Refills: 4 | Status: SHIPPED | OUTPATIENT
Start: 2017-06-28 | End: 2017-11-29 | Stop reason: SDUPTHER

## 2017-06-28 RX ORDER — CYCLOBENZAPRINE HCL 10 MG
10 TABLET ORAL 3 TIMES DAILY PRN
Qty: 15 TABLET | Refills: 0 | Status: SHIPPED | OUTPATIENT
Start: 2017-06-28 | End: 2017-07-08

## 2017-06-28 RX ORDER — ALBUTEROL SULFATE 90 UG/1
AEROSOL, METERED RESPIRATORY (INHALATION)
Qty: 18 G | Refills: 3 | Status: SHIPPED | OUTPATIENT
Start: 2017-06-28 | End: 2017-09-13 | Stop reason: SDUPTHER

## 2017-06-28 RX ORDER — LISINOPRIL AND HYDROCHLOROTHIAZIDE 20; 25 MG/1; MG/1
TABLET ORAL
Qty: 30 TABLET | Refills: 6 | Status: SHIPPED | OUTPATIENT
Start: 2017-06-28 | End: 2018-07-22 | Stop reason: SDUPTHER

## 2017-06-28 NOTE — TELEPHONE ENCOUNTER
Patient also requesting Tramadol 50 mg.  And Cyclobenzapr.  Please note patient had positive urine tox.

## 2017-09-13 DIAGNOSIS — J44.9 CHRONIC OBSTRUCTIVE PULMONARY DISEASE, UNSPECIFIED COPD TYPE: Chronic | ICD-10-CM

## 2017-09-14 RX ORDER — ALBUTEROL SULFATE 90 UG/1
AEROSOL, METERED RESPIRATORY (INHALATION)
Qty: 18 G | Refills: 0 | Status: SHIPPED | OUTPATIENT
Start: 2017-09-14 | End: 2017-10-08 | Stop reason: SDUPTHER

## 2017-09-19 ENCOUNTER — DOCUMENTATION ONLY (OUTPATIENT)
Dept: FAMILY MEDICINE | Facility: CLINIC | Age: 62
End: 2017-09-19

## 2017-09-19 NOTE — PROGRESS NOTES
Pre-Visit Chart Review  For Appointment Scheduled on 9/26/17    Health Maintenance Due   Topic Date Due    Pneumococcal PPSV23 (Medium Risk) (1) 09/28/1973    Zoster Vaccine  09/28/2016    Pap Smear with HPV Cotest  02/11/2017    Influenza Vaccine  08/01/2017

## 2017-09-26 ENCOUNTER — OFFICE VISIT (OUTPATIENT)
Dept: FAMILY MEDICINE | Facility: CLINIC | Age: 62
End: 2017-09-26
Payer: COMMERCIAL

## 2017-09-26 VITALS
HEART RATE: 76 BPM | WEIGHT: 162.25 LBS | BODY MASS INDEX: 24.03 KG/M2 | OXYGEN SATURATION: 96 % | DIASTOLIC BLOOD PRESSURE: 76 MMHG | TEMPERATURE: 100 F | RESPIRATION RATE: 14 BRPM | HEIGHT: 69 IN | SYSTOLIC BLOOD PRESSURE: 130 MMHG

## 2017-09-26 DIAGNOSIS — Z23 NEED FOR PNEUMOCOCCAL VACCINATION: ICD-10-CM

## 2017-09-26 DIAGNOSIS — J06.9 UPPER RESPIRATORY TRACT INFECTION, UNSPECIFIED TYPE: ICD-10-CM

## 2017-09-26 DIAGNOSIS — Z23 NEED FOR INFLUENZA VACCINATION: ICD-10-CM

## 2017-09-26 DIAGNOSIS — G89.29 CHRONIC LOW BACK PAIN WITH BILATERAL SCIATICA, UNSPECIFIED BACK PAIN LATERALITY: Primary | ICD-10-CM

## 2017-09-26 DIAGNOSIS — M54.41 CHRONIC LOW BACK PAIN WITH BILATERAL SCIATICA, UNSPECIFIED BACK PAIN LATERALITY: Primary | ICD-10-CM

## 2017-09-26 DIAGNOSIS — J44.9 CHRONIC OBSTRUCTIVE PULMONARY DISEASE, UNSPECIFIED COPD TYPE: Chronic | ICD-10-CM

## 2017-09-26 DIAGNOSIS — M54.42 CHRONIC LOW BACK PAIN WITH BILATERAL SCIATICA, UNSPECIFIED BACK PAIN LATERALITY: Primary | ICD-10-CM

## 2017-09-26 PROCEDURE — 3075F SYST BP GE 130 - 139MM HG: CPT | Mod: S$GLB,,, | Performed by: PHYSICIAN ASSISTANT

## 2017-09-26 PROCEDURE — 90472 IMMUNIZATION ADMIN EACH ADD: CPT | Mod: S$GLB,,, | Performed by: PHYSICIAN ASSISTANT

## 2017-09-26 PROCEDURE — 90732 PPSV23 VACC 2 YRS+ SUBQ/IM: CPT | Mod: S$GLB,,, | Performed by: PHYSICIAN ASSISTANT

## 2017-09-26 PROCEDURE — 3078F DIAST BP <80 MM HG: CPT | Mod: S$GLB,,, | Performed by: PHYSICIAN ASSISTANT

## 2017-09-26 PROCEDURE — 90686 IIV4 VACC NO PRSV 0.5 ML IM: CPT | Mod: S$GLB,,, | Performed by: PHYSICIAN ASSISTANT

## 2017-09-26 PROCEDURE — 90471 IMMUNIZATION ADMIN: CPT | Mod: S$GLB,,, | Performed by: PHYSICIAN ASSISTANT

## 2017-09-26 PROCEDURE — 99214 OFFICE O/P EST MOD 30 MIN: CPT | Mod: 25,S$GLB,, | Performed by: PHYSICIAN ASSISTANT

## 2017-09-26 PROCEDURE — 3008F BODY MASS INDEX DOCD: CPT | Mod: S$GLB,,, | Performed by: PHYSICIAN ASSISTANT

## 2017-09-26 PROCEDURE — 99999 PR PBB SHADOW E&M-EST. PATIENT-LVL V: CPT | Mod: PBBFAC,,, | Performed by: PHYSICIAN ASSISTANT

## 2017-09-26 RX ORDER — VITAMIN B COMPLEX
1 CAPSULE ORAL DAILY
COMMUNITY
End: 2018-03-20

## 2017-09-26 NOTE — PROGRESS NOTES
Administered flu and pneumovax vaccine IM. Patient tolerated well. No bleeding at insertion site noted. Pain scale 0/10 with injection. 2 patient identifiers used (name, ), aseptic technique maintained.

## 2017-09-26 NOTE — PATIENT INSTRUCTIONS
Established High Blood Pressure    High blood pressure (hypertension) is a chronic disease. Often, healthcare providers dont know what causes it. But it can be caused by certain health conditions and medicines.  If you have high blood pressure, you may not have any symptoms. If you do have symptoms, they may include headache, dizziness, changes in your vision, chest pain, and shortness of breath. But even without symptoms, high blood pressure thats not treated raises your risk for heart attack and stroke. High blood pressure is a serious health risk and shouldnt be ignored.  A blood pressure reading is made up of two numbers: a higher number over a lower number. The top number is the systolic pressure. The bottom number is the diastolic pressure. A normal blood pressure is a systolic pressure of  less than 120 over a diastolic pressure of less than 80. You will see your blood pressure readings written together. For example, a person with a systolic pressure of 188 and a diastolic pressure of 78 will have 118/78 written in the medical record.  High blood pressure is when either the top number is 140 or higher, or the bottom number is 90 or higher. This must be the result when taking your blood pressure a number of times. The blood pressures between normal and high are called prehypertension.  Home care  If you have high blood pressure, you should do what is listed below to lower your blood pressure. If you are taking medicines for high blood pressure, these methods may reduce or end your need for medicines in the future.  · Begin a weight-loss program if you are overweight.  · Cut back on how much salt you get in your diet. Heres how to do this:  ¨ Dont eat foods that have a lot of salt. These include olives, pickles, smoked meats, and salted potato chips.  ¨ Dont add salt to your food at the table.  ¨ Use only small amounts of salt when cooking.  · Start an exercise program. Talk with your healthcare  provider about the type of exercise program that would be best for you. It doesn't have to be hard. Even brisk walking for 20 minutes 3 times a week is a good form of exercise.  · Dont take medicines that stimulate the heart. This includes many over-the-counter cold and sinus decongestant pills and sprays, as well as diet pills. Check the warnings about hypertension on the label. Before buying any over-the-counter medicines or supplements, always ask the pharmacist about the product's potential interaction with your high blood pressure and your high blood pressure medicines.  · Stimulants such as amphetamine or cocaine could be deadly for someone with high blood pressure. Never take these.  · Limit how much caffeine you get in your diet. Switch to caffeine-free products.  · Stop smoking. If you are a long-time smoker, this can be hard. Talk to your healthcare provider about medicines and nicotine replacement options to help you. Also, enroll in a stop-smoking program to make it more likely that you will quit for good.  · Learn how to handle stress. This is an important part of any program to lower blood pressure. Learn about relaxation methods like meditation, yoga, or biofeedback.  · If your provider prescribed medicines, take them exactly as directed. Missing doses may cause your blood pressure get out of control.  · If you miss a dose or doses, check with your healthcare provider or pharmacist about what to do.  · Consider buying an automatic blood pressure machine. Ask your provider for a recommendation. You can get one of these at most pharmacies.     The American Heart Association recommends the following guidelines for home blood pressure monitoring:  · Don't smoke or drink coffee for 30 minutes before taking your blood pressure.  · Go to the bathroom before the test.  · Relax for 5 minutes before taking the measurement.  · Sit with your back supported (don't sit on a couch or soft chair); keep your feet on  the floor uncrossed. Place your arm on a solid flat surface (like a table) with the upper part of the arm at heart level. Place the middle of the cuff directly above the eye of the elbow. Check the monitor's instruction manual for an illustration.  · Take multiple readings. When you measure, take 2 to 3 readings one minute apart and record all of the results.  · Take your blood pressure at the same time every day, or as your healthcare provider recommends.  · Record the date, time, and blood pressure reading.  · Take the record with you to your next medical appointment. If your blood pressure monitor has a built-in memory, simply take the monitor with you to your next appointment.  · Call your provider if you have several high readings. Don't be frightened by a single high blood pressure reading, but if you get several high readings, check in with your healthcare provider.  · Note: When blood pressure reaches a systolic (top number) of 180 or higher OR diastolic (bottom number) of 110 or higher, seek emergency medical treatment.  Follow-up care  You will need to see your healthcare provider regularly. This is to check your blood pressure and to make changes to your medicines. Make a follow-up appointment as directed. Bring the record of your home blood pressure readings to the appointment.  When to seek medical advice  Call your healthcare provider right away if any of these occur:  · Blood pressure reaches a systolic (upper number) of 180 or higher OR a diastolic (bottom number) of 110 or higher  · Chest pain or shortness of breath  · Severe headache  · Throbbing or rushing sound in the ears  · Nosebleed  · Sudden severe pain in your belly (abdomen)  · Extreme drowsiness, confusion, or fainting  · Dizziness or spinning sensation (vertigo)  · Weakness of an arm or leg or one side of the face  · You have problems speaking or seeing   Date Last Reviewed: 12/1/2016  © 8785-5115 [employer]. [address] [place] [address]  [place] [address]. All rights reserved. This information is not intended as a substitute for professional medical care. Always follow your healthcare professional's instructions.

## 2017-09-26 NOTE — PROGRESS NOTES
Subjective:       Patient ID: Abbye Hagan is a 61 y.o. female.    Chief Complaint: Follow-up (F/[facility])    Ms. Hagan comes to clinic today for follow up of upper respiratory infection treated at . The patient was treated with oral steroids, oral antibiotics, cough syrup, steroid and antibiotic shot. She reports she is feeling better. She does continue to smoke despite recommendations to quit. The patient is trying to cut back on the cigarette smoking.  She does complain of bilateral back pain that radiates down to her leg. She reports that the steroid injection and oral steroids did help with this pain. The patient is interested in seeing pain management for the pain.       Review of Systems   Constitutional: Negative for activity change, appetite change and fever.   HENT: Negative for postnasal drip, rhinorrhea and sinus pressure.    Eyes: Negative for visual disturbance.   Respiratory: Negative for cough and shortness of breath.    Cardiovascular: Negative for chest pain.   Gastrointestinal: Negative for abdominal distention and abdominal pain.   Genitourinary: Negative for difficulty urinating and dysuria.   Musculoskeletal: Positive for arthralgias, back pain and myalgias.   Neurological: Negative for headaches.   Hematological: Negative for adenopathy.   Psychiatric/Behavioral: The patient is not nervous/anxious.        Objective:      Physical Exam   Constitutional: She is oriented to person, place, and time.   HENT:   Mouth/Throat: Oropharynx is clear and moist. No oropharyngeal exudate.   Eyes: Conjunctivae are normal. Pupils are equal, round, and reactive to light.   Cardiovascular: Normal rate and regular rhythm.    Pulmonary/Chest: Effort normal and breath sounds normal. She has no wheezes.   Decreased breath sounds in all lung fields (chronic due to COPD). No wheezes present   Abdominal: Soft. Bowel sounds are normal. She exhibits no distension. There is no tenderness.    Musculoskeletal: She exhibits no edema.   Pain in low back radiating to legs with changing position  Mildly positive SLR bilaterally   Lymphadenopathy:     She has no cervical adenopathy.   Neurological: She is alert and oriented to person, place, and time.   Skin: No erythema.   Psychiatric: Her behavior is normal.       Assessment:       1. Chronic low back pain with bilateral sciatica, unspecified back pain laterality    2. Chronic obstructive pulmonary disease, unspecified COPD type    3. Need for influenza vaccination    4. Need for pneumococcal vaccination    5. Upper respiratory tract infection, unspecified type        Plan:       Abbey was seen today for follow-up.    Diagnoses and all orders for this visit:    Chronic low back pain with bilateral sciatica, unspecified back pain laterality  -     Ambulatory referral to Pain Clinic    Chronic obstructive pulmonary disease, unspecified COPD type  Patient encouraged to stop smoking; patient refuses to quit at this time  Patient encouraged to start a daily maintenance inhaler; patient refuses at this time.   Need for influenza vaccination  -     Influenza - Quadrivalent (3 years & older) (PF)    Need for pneumococcal vaccination  -     (In Office Administered) Pneumococcal Polysaccharide Vaccine (23 Valent) (SQ/IM)    Upper respiratory tract infection, unspecified type  Symptoms resolved  If symptoms recur contact clinic

## 2017-10-08 DIAGNOSIS — J44.9 CHRONIC OBSTRUCTIVE PULMONARY DISEASE, UNSPECIFIED COPD TYPE: Chronic | ICD-10-CM

## 2017-10-09 RX ORDER — ALBUTEROL SULFATE 90 UG/1
AEROSOL, METERED RESPIRATORY (INHALATION)
Qty: 18 G | Refills: 0 | Status: SHIPPED | OUTPATIENT
Start: 2017-10-09 | End: 2017-10-28 | Stop reason: SDUPTHER

## 2017-10-18 ENCOUNTER — OFFICE VISIT (OUTPATIENT)
Dept: PAIN MEDICINE | Facility: CLINIC | Age: 62
End: 2017-10-18
Payer: COMMERCIAL

## 2017-10-18 VITALS
BODY MASS INDEX: 23.99 KG/M2 | DIASTOLIC BLOOD PRESSURE: 83 MMHG | SYSTOLIC BLOOD PRESSURE: 141 MMHG | HEIGHT: 69 IN | WEIGHT: 162 LBS | HEART RATE: 64 BPM

## 2017-10-18 DIAGNOSIS — M51.36 DDD (DEGENERATIVE DISC DISEASE), LUMBAR: Primary | ICD-10-CM

## 2017-10-18 DIAGNOSIS — Z96.649 STATUS POST TOTAL REPLACEMENT OF HIP, UNSPECIFIED LATERALITY: ICD-10-CM

## 2017-10-18 DIAGNOSIS — M54.16 LUMBAR RADICULITIS: ICD-10-CM

## 2017-10-18 PROCEDURE — 99999 PR PBB SHADOW E&M-EST. PATIENT-LVL III: CPT | Mod: PBBFAC,,, | Performed by: ANESTHESIOLOGY

## 2017-10-18 PROCEDURE — 99214 OFFICE O/P EST MOD 30 MIN: CPT | Mod: S$GLB,,, | Performed by: ANESTHESIOLOGY

## 2017-10-19 NOTE — PROGRESS NOTES
PCP: Mookie Kovacs MD      CC: Low back and bilateral leg pain    Interval history: Ms. Hagan returns to our clinic.  She was last seen in December 2015.  Since then, she has had bilateral hip replacement surgeries.  Her chronic hip pain has improved.  She presents to us with worsening lower back and radiating bilateral leg pain.  She has a history of lumbar DDD and lumbar stenosis.  She has had lumbar MARANDA's in the past with moderate benefit.  Pain is a constant aching, throbbing pain in her lower back pain.  Is a bilateral legs into her posterior thighs.  She denies any weakness.  No bowel bladder changes.  She rates her pain 4/10 today.   Prior HPI:   Ms. Hagan is a 59 year old female with PMH of HTN and depression referred by Dr. Kovacs for left leg pain.  Pain has been present for the past 9 months.  No traumatic incident.  Pain is a constant burning, throbbing pain in his left hip with shooting pain down her left anterior and lateral thigh into her left knee.  She has pain with movement of her left hip.  Mild lower back pain reported.  Pain worsens with sitting, standing, bending and walking.  She denies any leg weakness.  No bowel or bladder changes.  She had a recent lumbar MRI performed that showed lumbar stenosis at multiple levels.  She takes Norco 10-325mg q8hrs as needed for pain prescribed by Dr. Kovacs with moderate benefit.  She rates her pain 5/10 today, 10/10 at worst, 5/10 at best.    Interventional history: Status post left L3-4 L4-L5 and 12/2014 with moderate benefit of her left leg pain    ROS:  CONSTITUTIONAL: No fevers, chills, night sweats, wt. loss, appetite changes  SKIN: no rashes or itching  ENT: No headaches, head trauma, vision changes, or eye pain  LYMPH NODES: None noticed   CV: No chest pain, palpitations.   RESP: No shortness of breath, dyspnea on exertion, cough, wheezing, or hemoptysis  GI: No nausea, emesis, diarrhea, constipation, melena, hematochezia, pain.    : No  "dysuria, hematuria, urgency, or frequency   HEME: No easy bruising, bleeding problems  PSYCHIATRIC: No depression, anxiety, psychosis, hallucinations.  NEURO: No seizures, memory loss, dizziness or difficulty sleeping  MSK: +HPI      Past Medical History:   Diagnosis Date    Anxiety     Arthritis     back, neck and knees    Depression     Emphysema of lung     HSV infection     Hypertension      Past Surgical History:   Procedure Laterality Date    HIP ARTHROPLASTY      bilat    JOINT REPLACEMENT      bilat hip     Family History   Problem Relation Age of Onset    COPD Mother     Hypertension Mother     Heart disease Mother     Alcohol abuse Father     Depression Sister     Hypertension Sister     Colon cancer Maternal Grandmother     Breast cancer Paternal Aunt     Ovarian cancer Neg Hx     Psoriasis Neg Hx     Lupus Neg Hx     Eczema Neg Hx     Melanoma Neg Hx      Social History     Social History    Marital status: Single     Spouse name: N/A    Number of children: N/A    Years of education: N/A     Occupational History     [employer]     Social History Main Topics    Smoking status: Current Every Day Smoker     Packs/day: 0.50     Years: 40.00     Types: Cigarettes    Smokeless tobacco: Never Used    Alcohol use Yes      Comment: socially    Drug use:      Types: Oxycodone    Sexual activity: Not Currently     Other Topics Concern    None     Social History Narrative    None         Medications/Allergies: See med card    Vitals:    10/18/17 1557   BP: (!) 141/83   Pulse: 64   Weight: 73.5 kg (162 lb)   Height: 5' 9" (1.753 m)   PainSc:   4   PainLoc: Back         Physical exam:    GENERAL: A and O x3, the patient appears well groomed and is in no acute distress.  Skin: No rashes or obvious lesions  HEENT: normocephalic, atraumatic  CARDIOVASCULAR:  Palpable peripheral pulses  LUNGS: easy work of breathing  ABDOMEN: soft, nontender   UPPER EXTREMITIES: Normal alignment, normal " range of motion, no atrophy, no skin changes,  hair growth and nail growth normal and equal bilaterally. No swelling, no tenderness.    LOWER EXTREMITIES:  Normal alignment, normal range of motion, no atrophy, no skin changes,  hair growth and nail growth normal and equal bilaterally. No swelling, no tenderness.    LUMBAR SPINE  Lumbar spine: ROM is full with flexion extension and oblique extension with mild increased pain.    Reggie's test causes no increased pain on either side.    Supine straight leg raise is positive on left    Internal and external rotation of the hip causes moderate increased pain on either side.  Myofascial exam: No tenderness to palpation across lumbar paraspinous muscles.      MENTAL STATUS: normal orientation, speech, language, and fund of knowledge for social situation.  Emotional state appropriate.    CRANIAL NERVES:  II:  PERRL bilaterally,   III,IV,VI: EOMI.    V:  Facial sensation equal bilaterally  VII:  Facial motor function normal.  VIII:  Hearing equal to finger rub bilaterally  IX/X: Gag normal, palate symmetric  XI:  Shoulder shrug equal, head turn equal  XII:  Tongue midline without fasciculations      MOTOR: Tone and bulk: normal bilateral upper and lower Strength: normal   IP ADD ABD Quad TA Gas HAM  R 5 5 5 5 5 5 5  L 5 5 5 5 5 5 5    SENSATION: Light touch and pinprick intact bilaterally  REFLEXES: normal, symmetric, nonbrisk.  Toes down, no clonus. No hoffmans.  GAIT: normal rise, base, steps, and arm swing.        Imaging:  MRI L-spine 11/12/2014  At L1-2 there is circumferential disk protrusion, short pedicles and prominent hypertrophy of the facets and ligamentum flavum producing moderate spinal canal stenosis and mild neuroforaminal stenosis.    At L2-3 there is circumferential disk protrusion short pedicles and hypertrophy producing moderate spinal canal and mild bilateral neural foraminal stenosis.    At L3-4 there is circumferential disk protrusion short pedicles  and prominent hypertrophy of the facets and ligamentum flavum which produces rather severe spinal canal stenosis. This is best seen series 5 image 8 and series 4 image 7. There is also   bilateral neural foraminal stenosis moderate.    At L4-5 there is circumferential disk protrusion short pedicles and hypertrophy which produces severe spinal canal and left greater than right neural foraminal stenosis. This is best demonstrated series 5 image 11. The ligamentum flavum at this level   shows irregular signal and some gadolinium enhancement and a small cyst measuring 6 mm to the right best demonstrated series 4 image 7 and series 7 image 7    At L5-S1 there is some spondylosis and left neural foraminal stenosis without disk protrusion.     Assessment:  Ms. Hagan is referred by Dr. Kovacs for left leg pain  1. DDD (degenerative disc disease), lumbar    2. Lumbar radiculitis    3. Status post total replacement of hip, unspecified laterality          Plan:  - I have stressed the importance of physical activity and exercise to improve overall health  - I think that the patient's back pain and radicular leg symptoms are due to degenerative disc disease and have recommended a lumbar epidural steroid injection to the Bilateral L3-4 and L4-5 level.    - Follow up after procedure

## 2017-10-23 DIAGNOSIS — M54.16 LUMBAR RADICULOPATHY: Primary | ICD-10-CM

## 2017-10-24 ENCOUNTER — PATIENT MESSAGE (OUTPATIENT)
Dept: SURGERY | Facility: AMBULARY SURGERY CENTER | Age: 62
End: 2017-10-24

## 2017-10-28 DIAGNOSIS — J44.9 CHRONIC OBSTRUCTIVE PULMONARY DISEASE, UNSPECIFIED COPD TYPE: Chronic | ICD-10-CM

## 2017-10-30 RX ORDER — ALBUTEROL SULFATE 90 UG/1
AEROSOL, METERED RESPIRATORY (INHALATION)
Qty: 18 G | Refills: 0 | Status: SHIPPED | OUTPATIENT
Start: 2017-10-30 | End: 2017-11-21 | Stop reason: SDUPTHER

## 2017-11-21 DIAGNOSIS — J44.9 CHRONIC OBSTRUCTIVE PULMONARY DISEASE, UNSPECIFIED COPD TYPE: Chronic | ICD-10-CM

## 2017-11-21 RX ORDER — ALBUTEROL SULFATE 90 UG/1
AEROSOL, METERED RESPIRATORY (INHALATION)
Qty: 18 G | Refills: 0 | Status: SHIPPED | OUTPATIENT
Start: 2017-11-21 | End: 2017-12-10 | Stop reason: SDUPTHER

## 2017-11-29 RX ORDER — ESCITALOPRAM OXALATE 20 MG/1
TABLET ORAL
Qty: 30 TABLET | Refills: 3 | Status: SHIPPED | OUTPATIENT
Start: 2017-11-29 | End: 2018-03-20 | Stop reason: SDUPTHER

## 2017-12-09 ENCOUNTER — PATIENT MESSAGE (OUTPATIENT)
Dept: FAMILY MEDICINE | Facility: CLINIC | Age: 62
End: 2017-12-09

## 2017-12-10 DIAGNOSIS — J44.9 CHRONIC OBSTRUCTIVE PULMONARY DISEASE, UNSPECIFIED COPD TYPE: Chronic | ICD-10-CM

## 2017-12-10 RX ORDER — ALBUTEROL SULFATE 90 UG/1
AEROSOL, METERED RESPIRATORY (INHALATION)
Qty: 18 G | Refills: 0 | Status: SHIPPED | OUTPATIENT
Start: 2017-12-10 | End: 2018-01-05 | Stop reason: SDUPTHER

## 2017-12-12 RX ORDER — TRAMADOL HYDROCHLORIDE 50 MG/1
50 TABLET ORAL EVERY 6 HOURS PRN
Qty: 90 TABLET | Refills: 0 | OUTPATIENT
Start: 2017-12-12 | End: 2017-12-22

## 2017-12-28 DIAGNOSIS — M54.12 CERVICAL RADICULOPATHY: ICD-10-CM

## 2017-12-28 DIAGNOSIS — M51.36 DDD (DEGENERATIVE DISC DISEASE), LUMBAR: ICD-10-CM

## 2017-12-28 DIAGNOSIS — M54.6 CHRONIC THORACIC BACK PAIN, UNSPECIFIED BACK PAIN LATERALITY: ICD-10-CM

## 2017-12-28 DIAGNOSIS — G89.29 CHRONIC THORACIC BACK PAIN, UNSPECIFIED BACK PAIN LATERALITY: ICD-10-CM

## 2017-12-29 RX ORDER — MELOXICAM 7.5 MG/1
TABLET ORAL
Qty: 30 TABLET | Refills: 0 | Status: SHIPPED | OUTPATIENT
Start: 2017-12-29 | End: 2018-01-23 | Stop reason: SDUPTHER

## 2018-01-05 DIAGNOSIS — J44.9 CHRONIC OBSTRUCTIVE PULMONARY DISEASE, UNSPECIFIED COPD TYPE: Chronic | ICD-10-CM

## 2018-01-05 RX ORDER — ALBUTEROL SULFATE 90 UG/1
AEROSOL, METERED RESPIRATORY (INHALATION)
Qty: 18 G | Refills: 0 | Status: SHIPPED | OUTPATIENT
Start: 2018-01-05 | End: 2018-01-29 | Stop reason: SDUPTHER

## 2018-01-23 DIAGNOSIS — G89.29 CHRONIC THORACIC BACK PAIN, UNSPECIFIED BACK PAIN LATERALITY: ICD-10-CM

## 2018-01-23 DIAGNOSIS — M51.36 DDD (DEGENERATIVE DISC DISEASE), LUMBAR: ICD-10-CM

## 2018-01-23 DIAGNOSIS — M54.6 CHRONIC THORACIC BACK PAIN, UNSPECIFIED BACK PAIN LATERALITY: ICD-10-CM

## 2018-01-23 DIAGNOSIS — M54.12 CERVICAL RADICULOPATHY: ICD-10-CM

## 2018-01-23 RX ORDER — MELOXICAM 7.5 MG/1
TABLET ORAL
Qty: 30 TABLET | Refills: 0 | Status: SHIPPED | OUTPATIENT
Start: 2018-01-23 | End: 2018-02-15 | Stop reason: SDUPTHER

## 2018-01-29 DIAGNOSIS — J44.9 CHRONIC OBSTRUCTIVE PULMONARY DISEASE, UNSPECIFIED COPD TYPE: Chronic | ICD-10-CM

## 2018-01-29 RX ORDER — ALBUTEROL SULFATE 90 UG/1
AEROSOL, METERED RESPIRATORY (INHALATION)
Qty: 18 G | Refills: 0 | Status: SHIPPED | OUTPATIENT
Start: 2018-01-29 | End: 2018-02-23 | Stop reason: SDUPTHER

## 2018-02-15 DIAGNOSIS — M54.6 CHRONIC THORACIC BACK PAIN, UNSPECIFIED BACK PAIN LATERALITY: ICD-10-CM

## 2018-02-15 DIAGNOSIS — G89.29 CHRONIC THORACIC BACK PAIN, UNSPECIFIED BACK PAIN LATERALITY: ICD-10-CM

## 2018-02-15 DIAGNOSIS — M51.36 DDD (DEGENERATIVE DISC DISEASE), LUMBAR: ICD-10-CM

## 2018-02-15 DIAGNOSIS — M54.12 CERVICAL RADICULOPATHY: ICD-10-CM

## 2018-02-15 RX ORDER — MELOXICAM 7.5 MG/1
TABLET ORAL
Qty: 30 TABLET | Refills: 0 | Status: SHIPPED | OUTPATIENT
Start: 2018-02-15 | End: 2018-03-20

## 2018-02-21 ENCOUNTER — PATIENT MESSAGE (OUTPATIENT)
Dept: FAMILY MEDICINE | Facility: CLINIC | Age: 63
End: 2018-02-21

## 2018-02-23 DIAGNOSIS — J44.9 CHRONIC OBSTRUCTIVE PULMONARY DISEASE, UNSPECIFIED COPD TYPE: Chronic | ICD-10-CM

## 2018-02-23 RX ORDER — ALBUTEROL SULFATE 90 UG/1
AEROSOL, METERED RESPIRATORY (INHALATION)
Qty: 18 G | Refills: 0 | Status: SHIPPED | OUTPATIENT
Start: 2018-02-23 | End: 2018-03-14 | Stop reason: SDUPTHER

## 2018-03-13 ENCOUNTER — PATIENT MESSAGE (OUTPATIENT)
Dept: FAMILY MEDICINE | Facility: CLINIC | Age: 63
End: 2018-03-13

## 2018-03-13 NOTE — TELEPHONE ENCOUNTER
Please see patient's e-mail. Patient is requesting a prescription for Ibuprofen 800 mg. This medication is not on patient's current medication list. Upon further inspection it was noted this medication was discontinued on 12-29-17 citing duplicate order as the reason for discontinue. Patient has a current order for Meloxicam 7.5 mg daily. Please advise.

## 2018-03-14 DIAGNOSIS — J44.9 CHRONIC OBSTRUCTIVE PULMONARY DISEASE, UNSPECIFIED COPD TYPE: Chronic | ICD-10-CM

## 2018-03-15 RX ORDER — ALBUTEROL SULFATE 90 UG/1
AEROSOL, METERED RESPIRATORY (INHALATION)
Qty: 18 G | Refills: 0 | Status: SHIPPED | OUTPATIENT
Start: 2018-03-15 | End: 2018-04-05 | Stop reason: SDUPTHER

## 2018-03-20 RX ORDER — ESCITALOPRAM OXALATE 20 MG/1
TABLET ORAL
Qty: 30 TABLET | Refills: 0 | Status: SHIPPED | OUTPATIENT
Start: 2018-03-20 | End: 2018-04-16 | Stop reason: SDUPTHER

## 2018-03-20 RX ORDER — IBUPROFEN 800 MG/1
800 TABLET ORAL 3 TIMES DAILY
Qty: 90 TABLET | Refills: 0 | Status: SHIPPED | OUTPATIENT
Start: 2018-03-20 | End: 2018-04-16 | Stop reason: SDUPTHER

## 2018-03-21 NOTE — TELEPHONE ENCOUNTER
Motrin 800 is not a long term medication. Please take with food.ibuprofen is a medication that could give her kidney and stomach problems.

## 2018-04-05 DIAGNOSIS — J44.9 CHRONIC OBSTRUCTIVE PULMONARY DISEASE, UNSPECIFIED COPD TYPE: Chronic | ICD-10-CM

## 2018-04-06 RX ORDER — ALBUTEROL SULFATE 90 UG/1
AEROSOL, METERED RESPIRATORY (INHALATION)
Qty: 18 G | Refills: 0 | Status: SHIPPED | OUTPATIENT
Start: 2018-04-06 | End: 2018-04-27 | Stop reason: SDUPTHER

## 2018-04-16 RX ORDER — ESCITALOPRAM OXALATE 20 MG/1
TABLET ORAL
Qty: 30 TABLET | Refills: 0 | Status: SHIPPED | OUTPATIENT
Start: 2018-04-16 | End: 2018-05-16 | Stop reason: SDUPTHER

## 2018-04-16 RX ORDER — IBUPROFEN 800 MG/1
TABLET ORAL
Qty: 90 TABLET | Refills: 0 | Status: SHIPPED | OUTPATIENT
Start: 2018-04-16 | End: 2018-05-13 | Stop reason: SDUPTHER

## 2018-04-19 ENCOUNTER — OFFICE VISIT (OUTPATIENT)
Dept: FAMILY MEDICINE | Facility: CLINIC | Age: 63
End: 2018-04-19
Payer: COMMERCIAL

## 2018-04-19 VITALS
DIASTOLIC BLOOD PRESSURE: 88 MMHG | HEART RATE: 79 BPM | WEIGHT: 158.06 LBS | SYSTOLIC BLOOD PRESSURE: 130 MMHG | BODY MASS INDEX: 23.41 KG/M2 | HEIGHT: 69 IN | TEMPERATURE: 99 F | OXYGEN SATURATION: 95 %

## 2018-04-19 DIAGNOSIS — Z00.00 WELLNESS EXAMINATION: Primary | ICD-10-CM

## 2018-04-19 DIAGNOSIS — J44.9 CHRONIC OBSTRUCTIVE PULMONARY DISEASE, UNSPECIFIED COPD TYPE: Chronic | ICD-10-CM

## 2018-04-19 DIAGNOSIS — I10 ESSENTIAL HYPERTENSION: ICD-10-CM

## 2018-04-19 DIAGNOSIS — M51.36 DDD (DEGENERATIVE DISC DISEASE), LUMBAR: ICD-10-CM

## 2018-04-19 DIAGNOSIS — F32.A DEPRESSION, UNSPECIFIED DEPRESSION TYPE: ICD-10-CM

## 2018-04-19 PROCEDURE — 99999 PR PBB SHADOW E&M-EST. PATIENT-LVL III: CPT | Mod: PBBFAC,,, | Performed by: FAMILY MEDICINE

## 2018-04-19 PROCEDURE — 99396 PREV VISIT EST AGE 40-64: CPT | Mod: S$GLB,,, | Performed by: FAMILY MEDICINE

## 2018-04-19 PROCEDURE — 3075F SYST BP GE 130 - 139MM HG: CPT | Mod: CPTII,S$GLB,, | Performed by: FAMILY MEDICINE

## 2018-04-19 PROCEDURE — 3079F DIAST BP 80-89 MM HG: CPT | Mod: CPTII,S$GLB,, | Performed by: FAMILY MEDICINE

## 2018-04-19 NOTE — PROGRESS NOTES
Subjective:       Patient ID: Abbey Hagan is a 62 y.o. female.    Chief Complaint: Follow-up    HPI     Annual Exam  Pt reports to the clinic for a wellness exam.   Currently, pt is without complaint.   The pt has a medical history which includes DDD, depression, COPD and HTN.   As far as smoking is concerned, the pt reports that she smokes.   The pt attempts to maintain a healthy diet but does not routinely exercise due to chronic pains.    Consistent seatbelt usage reported.   Pt has no symptoms of depression.     Review of Systems   Constitutional: Negative for chills and fever.   HENT: Negative for sore throat.    Eyes: Negative for visual disturbance.   Respiratory: Negative for cough and shortness of breath.    Cardiovascular: Negative for chest pain and leg swelling.   Gastrointestinal: Negative for abdominal pain, blood in stool, constipation, diarrhea and vomiting.   Genitourinary: Negative for difficulty urinating, dysuria and hematuria.   Musculoskeletal: Positive for back pain. Negative for arthralgias.   Neurological: Positive for weakness and numbness. Negative for dizziness.       Objective:      Physical Exam   Constitutional: She appears well-developed and well-nourished. No distress.   HENT:   Head: Normocephalic and atraumatic.   Mouth/Throat: Oropharynx is clear and moist. No oropharyngeal exudate.   Eyes: EOM are normal. Pupils are equal, round, and reactive to light.   Neck: Normal range of motion. Neck supple. No thyromegaly present.   Cardiovascular: Normal rate, regular rhythm, normal heart sounds and intact distal pulses.    Pulmonary/Chest: Effort normal and breath sounds normal. No respiratory distress. She has no wheezes.   Abdominal: Soft. Bowel sounds are normal. She exhibits no distension and no mass. There is no tenderness.   Musculoskeletal: She exhibits no edema.   Lymphadenopathy:     She has no cervical adenopathy.   Neurological: She is alert.   Skin: Skin is warm. No rash  noted. No erythema.   Psychiatric: She has a normal mood and affect. Her behavior is normal.   Vitals reviewed.      Assessment:       1. Wellness examination    2. DDD (degenerative disc disease), lumbar    3. Depression, unspecified depression type    4. Essential hypertension    5. Chronic obstructive pulmonary disease, unspecified COPD type        Plan:     1. Wellness examination  Patient has been advised to continue to maintain a healthy lifestyle, including regular exercise and consuming a well balanced diet.   Low impact exercises advised.   - Basic metabolic panel; Future  - Lipid panel; Future  - Microalbumin/creatinine urine ratio; Future    2. DDD (degenerative disc disease), lumbar  Patient is followed by Pain management for this condition. No changes to management of this condition on today.     3. Depression, unspecified depression type  Condition currently stable. No changes to medication regimen on today.   Continue lexapro  - TSH; Future    4. Essential hypertension  Condition currently stable. No changes to medication regimen on today.   - Basic metabolic panel; Future  - Microalbumin/creatinine urine ratio; Future    5. Chronic obstructive pulmonary disease, unspecified COPD type  - Ambulatory referral to Pulmonology    Patient readiness: acceptance and barriers:none    During the course of the visit the patient was educated and counseled about the following:     Hypertension:   Dietary sodium restriction.  Regular aerobic exercise.    Goals: Hypertension: Reduce Blood Pressure    Did patient meet goals/outcomes: yes    The following self management tools provided: blood pressure log  excercise log    Patient Instructions (the written plan) was given to the patient/family.     Time spent with patient: 15 minutes    Portions of this note were created using Dragon voice recognition software. There may be voice recognition errors found in the text, and attempts were made to correct these errors  prior to signature    Daryl Garcia MD    Family Medicine  4/19/2018

## 2018-04-20 ENCOUNTER — LAB VISIT (OUTPATIENT)
Dept: LAB | Facility: HOSPITAL | Age: 63
End: 2018-04-20
Attending: FAMILY MEDICINE
Payer: COMMERCIAL

## 2018-04-20 DIAGNOSIS — F32.A DEPRESSION, UNSPECIFIED DEPRESSION TYPE: ICD-10-CM

## 2018-04-20 DIAGNOSIS — I10 ESSENTIAL HYPERTENSION: ICD-10-CM

## 2018-04-20 DIAGNOSIS — Z00.00 WELLNESS EXAMINATION: ICD-10-CM

## 2018-04-20 LAB
ANION GAP SERPL CALC-SCNC: 10 MMOL/L
BUN SERPL-MCNC: 12 MG/DL
CALCIUM SERPL-MCNC: 10 MG/DL
CHLORIDE SERPL-SCNC: 98 MMOL/L
CHOLEST SERPL-MCNC: 183 MG/DL
CHOLEST/HDLC SERPL: 2.2 {RATIO}
CO2 SERPL-SCNC: 31 MMOL/L
CREAT SERPL-MCNC: 0.9 MG/DL
EST. GFR  (AFRICAN AMERICAN): >60 ML/MIN/1.73 M^2
EST. GFR  (NON AFRICAN AMERICAN): >60 ML/MIN/1.73 M^2
GLUCOSE SERPL-MCNC: 130 MG/DL
HDLC SERPL-MCNC: 83 MG/DL
HDLC SERPL: 45.4 %
LDLC SERPL CALC-MCNC: 88 MG/DL
NONHDLC SERPL-MCNC: 100 MG/DL
POTASSIUM SERPL-SCNC: 4.1 MMOL/L
SODIUM SERPL-SCNC: 139 MMOL/L
TRIGL SERPL-MCNC: 60 MG/DL
TSH SERPL DL<=0.005 MIU/L-ACNC: 1.13 UIU/ML

## 2018-04-20 PROCEDURE — 80048 BASIC METABOLIC PNL TOTAL CA: CPT

## 2018-04-20 PROCEDURE — 80061 LIPID PANEL: CPT

## 2018-04-20 PROCEDURE — 36415 COLL VENOUS BLD VENIPUNCTURE: CPT | Mod: PO

## 2018-04-20 PROCEDURE — 84443 ASSAY THYROID STIM HORMONE: CPT

## 2018-04-25 ENCOUNTER — TELEPHONE (OUTPATIENT)
Dept: FAMILY MEDICINE | Facility: CLINIC | Age: 63
End: 2018-04-25

## 2018-04-27 DIAGNOSIS — J44.9 CHRONIC OBSTRUCTIVE PULMONARY DISEASE, UNSPECIFIED COPD TYPE: Chronic | ICD-10-CM

## 2018-04-27 RX ORDER — ALBUTEROL SULFATE 90 UG/1
AEROSOL, METERED RESPIRATORY (INHALATION)
Qty: 18 G | Refills: 0 | Status: SHIPPED | OUTPATIENT
Start: 2018-04-27 | End: 2018-05-20 | Stop reason: SDUPTHER

## 2018-05-03 ENCOUNTER — TELEPHONE (OUTPATIENT)
Dept: FAMILY MEDICINE | Facility: CLINIC | Age: 63
End: 2018-05-03

## 2018-05-03 NOTE — TELEPHONE ENCOUNTER
----- Message from Daryl Garcia MD sent at 4/23/2018 11:33 AM CDT -----  Please inform patient that lab results are not concerning/normal. No changes to medications at this time and if there are any questions, to give us a call. Thank You,    Daryl Garcia MD  Family Medicine  4/23/2018

## 2018-05-15 RX ORDER — IBUPROFEN 800 MG/1
TABLET ORAL
Qty: 90 TABLET | Refills: 0 | Status: SHIPPED | OUTPATIENT
Start: 2018-05-15 | End: 2018-06-07 | Stop reason: SDUPTHER

## 2018-05-17 RX ORDER — ESCITALOPRAM OXALATE 20 MG/1
TABLET ORAL
Qty: 30 TABLET | Refills: 0 | Status: SHIPPED | OUTPATIENT
Start: 2018-05-17 | End: 2018-06-15 | Stop reason: SDUPTHER

## 2018-05-20 DIAGNOSIS — J44.9 CHRONIC OBSTRUCTIVE PULMONARY DISEASE, UNSPECIFIED COPD TYPE: Chronic | ICD-10-CM

## 2018-05-21 RX ORDER — ALBUTEROL SULFATE 90 UG/1
AEROSOL, METERED RESPIRATORY (INHALATION)
Qty: 18 G | Refills: 0 | Status: SHIPPED | OUTPATIENT
Start: 2018-05-21 | End: 2018-06-11 | Stop reason: SDUPTHER

## 2018-06-08 RX ORDER — IBUPROFEN 800 MG/1
TABLET ORAL
Qty: 90 TABLET | Refills: 0 | Status: SHIPPED | OUTPATIENT
Start: 2018-06-08 | End: 2018-08-14

## 2018-06-11 DIAGNOSIS — J44.9 CHRONIC OBSTRUCTIVE PULMONARY DISEASE, UNSPECIFIED COPD TYPE: Chronic | ICD-10-CM

## 2018-06-11 RX ORDER — ALBUTEROL SULFATE 90 UG/1
AEROSOL, METERED RESPIRATORY (INHALATION)
Qty: 18 G | Refills: 0 | Status: SHIPPED | OUTPATIENT
Start: 2018-06-11 | End: 2018-06-29 | Stop reason: SDUPTHER

## 2018-06-15 RX ORDER — ESCITALOPRAM OXALATE 20 MG/1
TABLET ORAL
Qty: 30 TABLET | Refills: 0 | Status: SHIPPED | OUTPATIENT
Start: 2018-06-15 | End: 2018-07-11 | Stop reason: SDUPTHER

## 2018-06-29 DIAGNOSIS — J44.9 CHRONIC OBSTRUCTIVE PULMONARY DISEASE, UNSPECIFIED COPD TYPE: Chronic | ICD-10-CM

## 2018-06-29 RX ORDER — ALBUTEROL SULFATE 90 UG/1
AEROSOL, METERED RESPIRATORY (INHALATION)
Qty: 18 G | Refills: 0 | Status: SHIPPED | OUTPATIENT
Start: 2018-06-29 | End: 2018-07-18 | Stop reason: SDUPTHER

## 2018-07-11 RX ORDER — ESCITALOPRAM OXALATE 20 MG/1
TABLET ORAL
Qty: 30 TABLET | Refills: 0 | Status: SHIPPED | OUTPATIENT
Start: 2018-07-11 | End: 2018-09-18 | Stop reason: SDUPTHER

## 2018-07-18 DIAGNOSIS — J44.9 CHRONIC OBSTRUCTIVE PULMONARY DISEASE, UNSPECIFIED COPD TYPE: Chronic | ICD-10-CM

## 2018-07-18 RX ORDER — ALBUTEROL SULFATE 90 UG/1
AEROSOL, METERED RESPIRATORY (INHALATION)
Qty: 18 G | Refills: 0 | Status: SHIPPED | OUTPATIENT
Start: 2018-07-18 | End: 2018-08-14 | Stop reason: SDUPTHER

## 2018-07-22 RX ORDER — LISINOPRIL AND HYDROCHLOROTHIAZIDE 20; 25 MG/1; MG/1
TABLET ORAL
Qty: 30 TABLET | Refills: 0 | Status: SHIPPED | OUTPATIENT
Start: 2018-07-22 | End: 2018-09-01 | Stop reason: SDUPTHER

## 2018-08-05 DIAGNOSIS — J44.9 CHRONIC OBSTRUCTIVE PULMONARY DISEASE, UNSPECIFIED COPD TYPE: Chronic | ICD-10-CM

## 2018-08-05 RX ORDER — ESCITALOPRAM OXALATE 20 MG/1
TABLET ORAL
Qty: 30 TABLET | Refills: 0 | OUTPATIENT
Start: 2018-08-05

## 2018-08-05 RX ORDER — ALBUTEROL SULFATE 90 UG/1
AEROSOL, METERED RESPIRATORY (INHALATION)
Qty: 18 G | Refills: 0 | OUTPATIENT
Start: 2018-08-05

## 2018-08-14 ENCOUNTER — OFFICE VISIT (OUTPATIENT)
Dept: PULMONOLOGY | Facility: CLINIC | Age: 63
End: 2018-08-14
Payer: COMMERCIAL

## 2018-08-14 ENCOUNTER — LAB VISIT (OUTPATIENT)
Dept: LAB | Facility: HOSPITAL | Age: 63
End: 2018-08-14
Attending: INTERNAL MEDICINE
Payer: COMMERCIAL

## 2018-08-14 ENCOUNTER — HOSPITAL ENCOUNTER (OUTPATIENT)
Dept: RADIOLOGY | Facility: CLINIC | Age: 63
Discharge: HOME | End: 2018-08-14
Attending: INTERNAL MEDICINE
Payer: COMMERCIAL

## 2018-08-14 ENCOUNTER — PATIENT MESSAGE (OUTPATIENT)
Dept: ORTHOPEDICS | Facility: CLINIC | Age: 63
End: 2018-08-14

## 2018-08-14 VITALS
HEART RATE: 67 BPM | OXYGEN SATURATION: 97 % | WEIGHT: 161.19 LBS | HEIGHT: 69 IN | SYSTOLIC BLOOD PRESSURE: 153 MMHG | BODY MASS INDEX: 23.87 KG/M2 | DIASTOLIC BLOOD PRESSURE: 88 MMHG

## 2018-08-14 DIAGNOSIS — F17.200 SMOKER: Primary | ICD-10-CM

## 2018-08-14 DIAGNOSIS — J44.9 CHRONIC OBSTRUCTIVE PULMONARY DISEASE, UNSPECIFIED COPD TYPE: Chronic | ICD-10-CM

## 2018-08-14 PROCEDURE — 36415 COLL VENOUS BLD VENIPUNCTURE: CPT | Mod: PO

## 2018-08-14 PROCEDURE — 99999 PR PBB SHADOW E&M-EST. PATIENT-LVL IV: CPT | Mod: PBBFAC,,, | Performed by: INTERNAL MEDICINE

## 2018-08-14 PROCEDURE — 99205 OFFICE O/P NEW HI 60 MIN: CPT | Mod: S$GLB,,, | Performed by: INTERNAL MEDICINE

## 2018-08-14 PROCEDURE — 71046 X-RAY EXAM CHEST 2 VIEWS: CPT | Mod: TC,FY,PO

## 2018-08-14 PROCEDURE — 82104 ALPHA-1-ANTITRYPSIN PHENO: CPT

## 2018-08-14 PROCEDURE — 71046 X-RAY EXAM CHEST 2 VIEWS: CPT | Mod: 26,,, | Performed by: RADIOLOGY

## 2018-08-14 RX ORDER — DOXYCYCLINE 100 MG/1
100 CAPSULE ORAL 2 TIMES DAILY
COMMUNITY
End: 2018-10-29 | Stop reason: ALTCHOICE

## 2018-08-14 RX ORDER — PREDNISONE 20 MG/1
20 TABLET ORAL DAILY
COMMUNITY
End: 2018-08-14 | Stop reason: SDUPTHER

## 2018-08-14 RX ORDER — ALBUTEROL SULFATE 90 UG/1
1-2 AEROSOL, METERED RESPIRATORY (INHALATION) EVERY 4 HOURS PRN
Qty: 18 G | Refills: 11 | Status: SHIPPED | OUTPATIENT
Start: 2018-08-14 | End: 2018-10-29 | Stop reason: SDUPTHER

## 2018-08-14 RX ORDER — AZITHROMYCIN 500 MG/1
TABLET, FILM COATED ORAL
Qty: 3 TABLET | Refills: 3 | Status: SHIPPED | OUTPATIENT
Start: 2018-08-14 | End: 2018-10-29 | Stop reason: ALTCHOICE

## 2018-08-14 RX ORDER — PROMETHAZINE HYDROCHLORIDE AND DEXTROMETHORPHAN HYDROBROMIDE 6.25; 15 MG/5ML; MG/5ML
SYRUP ORAL
COMMUNITY
End: 2018-10-29 | Stop reason: ALTCHOICE

## 2018-08-14 RX ORDER — BENZONATATE 100 MG/1
100 CAPSULE ORAL 3 TIMES DAILY PRN
COMMUNITY
End: 2018-10-29 | Stop reason: ALTCHOICE

## 2018-08-14 RX ORDER — PREDNISONE 20 MG/1
20 TABLET ORAL DAILY
Qty: 25 TABLET | Refills: 2 | Status: SHIPPED | OUTPATIENT
Start: 2018-08-14 | End: 2019-08-14 | Stop reason: SDUPTHER

## 2018-08-14 NOTE — LETTER
August 14, 2018      Daryl Garcia MD           [place] [facility]  [address]  [place] [address]  Phone: [phone]  Fax: [phone]          Patient: Abbey Hagan   MR Number: [id number]   YOB: 1955   Date of Visit: 8/14/2018       Dear Dr. Daryl Garcia:    Thank you for referring Abbey Hagan to me for evaluation. Attached you will find relevant portions of my assessment and plan of care.    If you have questions, please do not hesitate to call me. I look forward to following Abbey Hagan along with you.    Sincerely,    Gabe Cruz MD    Enclosure  CC:  No Recipients    If you would like to receive this communication electronically, please contact [email or web address] or [phone] to request more information on [facility] access.    For providers and/or their staff who would like to refer a patient to [facility], please contact us through our one-stop-shop provider referral line, [facility], at [phone].    If you feel you have received this communication in error or would no longer like to receive these types of communications, please e-mail [email or web address]

## 2018-08-14 NOTE — PATIENT INSTRUCTIONS
Lung capacity was 26% 2013- need to stop smokes, below 20 is crippled, may loose independence.    Lung transplant range is below 30 %-     Use trelegy daily,   Use albuterol as needed  Take azithromycin for infection as needed  May use prednisone 20/d for 5 - could do as needed.  Monthly??    Avoid debility- may be hard to recover    Recheck capacity - prior numbers could have been bad day???  Re check cxr    Smoke cessation - program?

## 2018-08-14 NOTE — PROGRESS NOTES
"8/14/2018    Abbey Hagan  New Patient Consult    Chief Complaint   Patient presents with    COPD    Emphysema       HPI: pt with onset lung dx age 41's.  Has cough and wheezes - noturnally worse.  No seasonal variation.  Still smokes.  Worked as  17 yrs.  No sinus problems. [place]    Exacerbates 2x/y, took prednisone started 4 days ago.  No sinus.        The chief compliant  problem is new to me",   PFSH:  Past Medical History:   Diagnosis Date    Anxiety     Arthritis     back, neck and knees    Depression     Emphysema of lung     HSV infection     Hypertension          Past Surgical History:   Procedure Laterality Date    HIP ARTHROPLASTY      bilat    JOINT REPLACEMENT      bilat hip     Social History     Tobacco Use    Smoking status: Current Every Day Smoker     Packs/day: 0.50     Years: 40.00     Pack years: 20.00     Types: Cigarettes    Smokeless tobacco: Never Used    Tobacco comment: Patient down to 8 cigs a day   Substance Use Topics    Alcohol use: Yes     Frequency: 2-3 times a week     Drinks per session: 1 or 2     Binge frequency: Never     Comment: socially    Drug use: Yes     Types: Oxycodone     Family History   Problem Relation Age of Onset    COPD Mother     Hypertension Mother     Heart disease Mother     Alcohol abuse Father     Depression Sister     Hypertension Sister     Colon cancer Maternal Grandmother     Breast cancer Paternal Aunt     Ovarian cancer Neg Hx     Psoriasis Neg Hx     Lupus Neg Hx     Eczema Neg Hx     Melanoma Neg Hx      Review of patient's allergies indicates:   Allergen Reactions    Opioids-meperidine and related      Urine toxicology positive for THC    Penicillin g      Childhood allergy--unknown reaction    Sulfa (sulfonamide antibiotics)      Childhood allergy--unknown reaction       Performance Status:The patient's activity level is functions out of house.      Review of Systems:  a review of eleven " "systems covering constitutional, Eye, HEENT, Psych, Respiratory, Cardiac, GI, , Musculoskeletal, Endocrine, Dermatologic was negative except for pertinent findings as listed ABOVE and below: chr back pain,      Exam:Comprehensive exam done. BP (!) 153/88 (BP Location: Left arm, Patient Position: Sitting)   Pulse 67   Ht 5' 9" (1.753 m)   Wt 73.1 kg (161 lb 2.5 oz)   LMP 12/06/1988   SpO2 97%   BMI 23.80 kg/m²   Exam included Vitals as listed, and patient's appearance and affect and alertness and mood, oral exam for yeast and hygiene and pharynx lesions and Mallapatti (M) score, neck with inspection for jvd and masses and thyroid abnormalities and lymph nodes (supraclavicular and infraclavicular nodes and axillary also examined and noted if abn), chest exam included symmetry and effort and fremitus and percussion and auscultation, cardiac exam included rhythm and gallops and murmur and rubs and jvd and edema, abdominal exam for mass and hepatosplenomegaly and tenderness and hernias and bowel sounds, Musculoskeletal exam with muscle tone and posture and mobility/gait and  strength, and skin for rashes and cyanosis and pallor and turgor, extremity for clubbing.  Findings were normal except for pertinent findings listed below:  M2, rock hard lumbar area, inspiratory wheezes throughout. chest is symmetric, no distress, normal percussion, normal fremitus , decreased bs, rest good.      Radiographs (ct chest and cxr) reviewed: view by direct vision  Feb 2017 copd changes     Labs reviewed      PFT results reviewed  15% vc response to bd, fev1 26%      Plan:  Clinical impression is resonably certain and repeated evaluation prn +/- follow up will be needed as below.     Abbey was seen today for copd and emphysema.    Diagnoses and all orders for this visit:    Smoker    Chronic obstructive pulmonary disease, unspecified COPD type  -     fluticasone-umeclidin-vilanter (TRELEGY ELLIPTA) 100-62.5-25 mcg DsDv; " Inhale 1 puff into the lungs once daily.  -     albuterol (VENTOLIN HFA) 90 mcg/actuation inhaler; Inhale 1-2 puffs into the lungs every 4 (four) hours as needed for Wheezing or Shortness of Breath. Rescue  -     predniSONE (DELTASONE) 20 MG tablet; Take 1 tablet (20 mg total) by mouth once daily. Take one daily for 5 days, repeat for breathing problems.  -     azithromycin (ZITHROMAX) 500 MG tablet; One daily for yellow mucous, repeat if needed  -     X-Ray Chest PA And Lateral; Future  -     Complete PFT with bronchodilator; Future  -     Alpha 1 Antitrypsin Phenotype; Future        Follow-up in about 3 months (around 11/14/2018), or if symptoms worsen or fail to improve.    Discussed with patient above for education the following:      Patient Instructions   Lung capacity was 26% 2013- need to stop smokes, below 20 is crippled, may loose independence.    Lung transplant range is below 30 %-     Use trelegy daily,   Use albuterol as needed  Take azithromycin for infection as needed  May use prednisone 20/d for 5 - could do as needed.  Monthly??    Avoid debility- may be hard to recover    Recheck capacity - prior numbers could have been bad day???  Re check cxr    Smoke cessation - program?

## 2018-08-20 ENCOUNTER — TELEPHONE (OUTPATIENT)
Dept: PULMONOLOGY | Facility: CLINIC | Age: 63
End: 2018-08-20

## 2018-08-20 LAB
A1AT PHENOTYP SERPL-IMP: NORMAL BANDS
A1AT SERPL NEPH-MCNC: 143 MG/DL

## 2018-08-20 NOTE — TELEPHONE ENCOUNTER
Per Dr. Cruz patient notified.    ----- Message from Gabe Cruz MD sent at 8/14/2018  4:47 PM CDT -----  Notify good cxr

## 2018-09-04 RX ORDER — LISINOPRIL AND HYDROCHLOROTHIAZIDE 20; 25 MG/1; MG/1
TABLET ORAL
Qty: 30 TABLET | Refills: 3 | Status: SHIPPED | OUTPATIENT
Start: 2018-09-04 | End: 2019-08-28

## 2018-09-18 RX ORDER — ESCITALOPRAM OXALATE 20 MG/1
TABLET ORAL
Qty: 30 TABLET | Refills: 0 | Status: SHIPPED | OUTPATIENT
Start: 2018-09-18 | End: 2018-10-14 | Stop reason: SDUPTHER

## 2018-10-14 DIAGNOSIS — F32.A DEPRESSION, UNSPECIFIED DEPRESSION TYPE: Primary | ICD-10-CM

## 2018-10-15 NOTE — PROGRESS NOTES
Refill Authorization Note     is requesting a refill authorization.    Brief assessment and rationale for refill: APPROVE: needs new pcp   Amount/Quantity of medication ordered: 90d        Refills Authorized: Yes  If authorized number of refills: 0        Medication-related problems identified: Requires appointment  Medication Therapy Plan: Depression recently commented as stable; approve 3 more  Name and strength of medication: ESCITALOPRAM 20MG TABLETS  How patient will take medication: t1t qd  Medication reconciliation completed: No        Comments:

## 2018-10-18 RX ORDER — ESCITALOPRAM OXALATE 20 MG/1
TABLET ORAL
Qty: 90 TABLET | Refills: 0 | Status: SHIPPED | OUTPATIENT
Start: 2018-10-18 | End: 2019-01-10 | Stop reason: SDUPTHER

## 2018-10-29 ENCOUNTER — OFFICE VISIT (OUTPATIENT)
Dept: FAMILY MEDICINE | Facility: CLINIC | Age: 63
End: 2018-10-29
Payer: COMMERCIAL

## 2018-10-29 ENCOUNTER — LAB VISIT (OUTPATIENT)
Dept: LAB | Facility: HOSPITAL | Age: 63
End: 2018-10-29
Attending: NURSE PRACTITIONER
Payer: COMMERCIAL

## 2018-10-29 VITALS
WEIGHT: 163.81 LBS | TEMPERATURE: 99 F | HEIGHT: 69 IN | DIASTOLIC BLOOD PRESSURE: 84 MMHG | HEART RATE: 85 BPM | SYSTOLIC BLOOD PRESSURE: 130 MMHG | BODY MASS INDEX: 24.26 KG/M2

## 2018-10-29 DIAGNOSIS — I10 ESSENTIAL HYPERTENSION: ICD-10-CM

## 2018-10-29 DIAGNOSIS — I10 ESSENTIAL HYPERTENSION: Primary | ICD-10-CM

## 2018-10-29 DIAGNOSIS — J34.89 NASAL SORE: ICD-10-CM

## 2018-10-29 DIAGNOSIS — M54.31 BILATERAL SCIATICA: ICD-10-CM

## 2018-10-29 DIAGNOSIS — J44.9 CHRONIC OBSTRUCTIVE PULMONARY DISEASE, UNSPECIFIED COPD TYPE: Chronic | ICD-10-CM

## 2018-10-29 DIAGNOSIS — R53.83 CAREGIVER WITH FATIGUE: ICD-10-CM

## 2018-10-29 DIAGNOSIS — M54.32 BILATERAL SCIATICA: ICD-10-CM

## 2018-10-29 DIAGNOSIS — Z12.11 COLON CANCER SCREENING: ICD-10-CM

## 2018-10-29 LAB
ALBUMIN SERPL BCP-MCNC: 4.4 G/DL
ALP SERPL-CCNC: 56 U/L
ALT SERPL W/O P-5'-P-CCNC: 23 U/L
ANION GAP SERPL CALC-SCNC: 12 MMOL/L
AST SERPL-CCNC: 25 U/L
BASOPHILS # BLD AUTO: 0.04 K/UL
BASOPHILS NFR BLD: 0.3 %
BILIRUB SERPL-MCNC: 1 MG/DL
BUN SERPL-MCNC: 11 MG/DL
CALCIUM SERPL-MCNC: 10.1 MG/DL
CHLORIDE SERPL-SCNC: 97 MMOL/L
CO2 SERPL-SCNC: 29 MMOL/L
CREAT SERPL-MCNC: 0.8 MG/DL
DIFFERENTIAL METHOD: ABNORMAL
EOSINOPHIL # BLD AUTO: 0.1 K/UL
EOSINOPHIL NFR BLD: 0.4 %
ERYTHROCYTE [DISTWIDTH] IN BLOOD BY AUTOMATED COUNT: 12.5 %
EST. GFR  (AFRICAN AMERICAN): >60 ML/MIN/1.73 M^2
EST. GFR  (NON AFRICAN AMERICAN): >60 ML/MIN/1.73 M^2
GLUCOSE SERPL-MCNC: 105 MG/DL
HCT VFR BLD AUTO: 45.3 %
HGB BLD-MCNC: 14.8 G/DL
IMM GRANULOCYTES # BLD AUTO: 0.03 K/UL
IMM GRANULOCYTES NFR BLD AUTO: 0.2 %
LYMPHOCYTES # BLD AUTO: 1.5 K/UL
LYMPHOCYTES NFR BLD: 11.4 %
MCH RBC QN AUTO: 32.2 PG
MCHC RBC AUTO-ENTMCNC: 32.7 G/DL
MCV RBC AUTO: 99 FL
MONOCYTES # BLD AUTO: 1.3 K/UL
MONOCYTES NFR BLD: 9.4 %
NEUTROPHILS # BLD AUTO: 10.6 K/UL
NEUTROPHILS NFR BLD: 78.3 %
NRBC BLD-RTO: 0 /100 WBC
PLATELET # BLD AUTO: 246 K/UL
PMV BLD AUTO: 11.1 FL
POTASSIUM SERPL-SCNC: 3.9 MMOL/L
PROT SERPL-MCNC: 7.3 G/DL
RBC # BLD AUTO: 4.6 M/UL
SODIUM SERPL-SCNC: 138 MMOL/L
WBC # BLD AUTO: 13.54 K/UL

## 2018-10-29 PROCEDURE — 90471 IMMUNIZATION ADMIN: CPT | Mod: S$GLB,,, | Performed by: NURSE PRACTITIONER

## 2018-10-29 PROCEDURE — 99214 OFFICE O/P EST MOD 30 MIN: CPT | Mod: 25,S$GLB,, | Performed by: NURSE PRACTITIONER

## 2018-10-29 PROCEDURE — 36415 COLL VENOUS BLD VENIPUNCTURE: CPT | Mod: PO

## 2018-10-29 PROCEDURE — 99999 PR PBB SHADOW E&M-EST. PATIENT-LVL V: CPT | Mod: PBBFAC,,, | Performed by: NURSE PRACTITIONER

## 2018-10-29 PROCEDURE — 90686 IIV4 VACC NO PRSV 0.5 ML IM: CPT | Mod: S$GLB,,, | Performed by: NURSE PRACTITIONER

## 2018-10-29 PROCEDURE — 85025 COMPLETE CBC W/AUTO DIFF WBC: CPT

## 2018-10-29 PROCEDURE — 80053 COMPREHEN METABOLIC PANEL: CPT

## 2018-10-29 RX ORDER — GABAPENTIN 100 MG/1
CAPSULE ORAL
Qty: 60 CAPSULE | Refills: 5 | Status: SHIPPED | OUTPATIENT
Start: 2018-10-29 | End: 2019-03-03 | Stop reason: SDUPTHER

## 2018-10-29 RX ORDER — NICOTINE POLACRILEX 2 MG
GUM BUCCAL
COMMUNITY
End: 2022-03-30

## 2018-10-29 RX ORDER — MUPIROCIN 20 MG/G
OINTMENT TOPICAL DAILY
Qty: 30 G | Refills: 0 | Status: SHIPPED | OUTPATIENT
Start: 2018-10-29 | End: 2019-08-28 | Stop reason: ALTCHOICE

## 2018-10-29 RX ORDER — ALBUTEROL SULFATE 90 UG/1
1-2 AEROSOL, METERED RESPIRATORY (INHALATION) EVERY 4 HOURS PRN
Qty: 18 G | Refills: 11 | Status: SHIPPED | OUTPATIENT
Start: 2018-10-29 | End: 2019-02-14 | Stop reason: SDUPTHER

## 2018-10-29 NOTE — MEDICAL/APP STUDENT
Ms Hagan is a 62yo former patient of Dr Garcia here today for 6 month follow up for HTN and depression.  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  She has sitters that stay with mother during the day and leave when she gets home.  She is  and has no children, she does have a sister who is not able to assist with her mother's care.  She reports she is under tremendous stress being the primary caregiver but reports her depression has not been any worse.  She denies any suicidal or homicidal ideation.  She does not have a therapist but does talk to co-workers.  She is aware of an EAP at work but has not looked further into it.  She reports compliance with blood pressure meds but does not monitor her pressure.  She is followed by Pulmonology Dr Cruz for her COPD, reports took prednisone x5 days last week for mild exacerbation.  She has chronic back pain which is now radiating down both upper legs, for which she takes Tylenol and occasional ibuprofen.  She has seen Dr Barger in the past for this, will follow-up with him when she has time.

## 2018-10-29 NOTE — PROGRESS NOTES
Subjective:       Patient ID: Abbey Hagan is a 63 y.o. female.    Chief Complaint: Hypertension    Ms Hagan presents to the clinic today for 6 month follow up for HTN and depression.  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  She has sitters that stay with mother during the day and leave when she gets home.  She is  and has no children, she does have a sister who is not able to assist with her mother's care.  She reports she is under tremendous stress being the primary caregiver but reports her depression has not been any worse.  She denies any suicidal or homicidal ideation.  She does not have a therapist but does talk to co-workers.  She is aware of an EAP at work but has not looked further into it.  She reports compliance with blood pressure meds but does not monitor her pressure.  She is followed by Pulmonology Dr Cruz for her COPD, reports took prednisone x5 days last week for mild exacerbation.  She has chronic back pain which is now radiating down both upper legs, for which she takes Tylenol and occasional ibuprofen.  She has seen Dr Barger in the past for this, will follow-up with him when she has time.  She requests something non-narcotic to help with sciatica.  No leg weakness, numbness, or loss of yara/bladder control.      Review of Systems   Constitutional: Negative for chills and fever.   HENT: Negative for congestion, ear pain and sinus pressure.    Eyes: Negative for visual disturbance.   Respiratory: Negative for cough and shortness of breath.    Cardiovascular: Negative for chest pain, palpitations and leg swelling.   Gastrointestinal: Negative for abdominal pain, constipation and diarrhea.   Musculoskeletal: Positive for arthralgias and back pain.   Skin: Positive for wound (right side of nose, usually goes away with mupirocin). Negative for rash.   Neurological: Negative for weakness and numbness.    Psychiatric/Behavioral: Negative for dysphoric mood and suicidal ideas.        + stress       Objective:      Physical Exam   Constitutional: She is oriented to person, place, and time. She appears well-nourished. No distress.   HENT:   Head: Normocephalic and atraumatic.   Right Ear: External ear normal.   Left Ear: External ear normal.   Mouth/Throat: Oropharynx is clear and moist. No oropharyngeal exudate.   Eyes: Pupils are equal, round, and reactive to light. Right eye exhibits no discharge. Left eye exhibits no discharge.   Neck: Neck supple. No thyromegaly present.   Cardiovascular: Normal rate and regular rhythm. Exam reveals no gallop and no friction rub.   No murmur heard.  Pulmonary/Chest: Effort normal and breath sounds normal. No respiratory distress. She has no wheezes. She has no rales.   Abdominal: Soft. She exhibits no distension. There is no tenderness.   Musculoskeletal:        Lumbar back: She exhibits no tenderness and no bony tenderness.   Lymphadenopathy:     She has no cervical adenopathy.   Neurological: She is alert and oriented to person, place, and time. Coordination normal.   Skin: Skin is warm and dry.   Psychiatric: She has a normal mood and affect. Her behavior is normal. Thought content normal.   Vitals reviewed.          Current Outpatient Medications:     albuterol (VENTOLIN HFA) 90 mcg/actuation inhaler, Inhale 1-2 puffs into the lungs every 4 (four) hours as needed for Wheezing or Shortness of Breath. Rescue, Disp: 18 g, Rfl: 11    biotin 1 mg Cap, Take by mouth., Disp: , Rfl:     CHOLECALCIFEROL, VITAMIN D3, (VITAMIN D-3 ORAL), No Sig Provided, Disp: , Rfl:     escitalopram oxalate (LEXAPRO) 20 MG tablet, TAKE 1 TABLET(20 MG) BY MOUTH EVERY MORNING, Disp: 90 tablet, Rfl: 0    fluticasone-umeclidin-vilanter (TRELEGY ELLIPTA) 100-62.5-25 mcg DsDv, Inhale 1 puff into the lungs once daily., Disp: 1 each, Rfl: 11    lisinopril-hydrochlorothiazide (PRINZIDE,ZESTORETIC) 20-25  mg Tab, TAKE 1/2 TABLET BY MOUTH EVERY MORNING, Disp: 30 tablet, Rfl: 3    multivitamin capsule, Take 1 capsule by mouth once daily., Disp: , Rfl:     POTASSIUM/MAGNESIUM (MAGNESIUM-POTASSIUM ORAL), Take 1 tablet by mouth once daily. Every day, Disp: , Rfl:     vitamin B complex (B COMPLEX 1 ORAL), Take by mouth., Disp: , Rfl:     gabapentin (NEURONTIN) 100 MG capsule, Take one tab by mouth nightly for 2 weeks then take two tabs by mouth nightly., Disp: 60 capsule, Rfl: 5    mupirocin (BACTROBAN) 2 % ointment, Apply topically once daily., Disp: 30 g, Rfl: 0    predniSONE (DELTASONE) 20 MG tablet, Take 1 tablet (20 mg total) by mouth once daily. Take one daily for 5 days, repeat for breathing problems., Disp: 25 tablet, Rfl: 2  Assessment:       1. Essential hypertension    2. Chronic obstructive pulmonary disease, unspecified COPD type    3. Caregiver with fatigue    4. Bilateral sciatica    5. Nasal sore    6. Colon cancer screening        Plan:       Abbey was seen today for hypertension.    Diagnoses and all orders for this visit:    Essential hypertension  Stable, continue current medication.  -     CBC auto differential; Future  -     Comprehensive metabolic panel; Future    Chronic obstructive pulmonary disease, unspecified COPD type  Stable, continue current medication.  -     albuterol (VENTOLIN HFA) 90 mcg/actuation inhaler; Inhale 1-2 puffs into the lungs every 4 (four) hours as needed for Wheezing or Shortness of Breath. Rescue    Caregiver with fatigue  Look into EAP with work.  -     Ambulatory referral to Outpatient Case Management    Bilateral sciatica  Home stretches, heat.  Pt does not have time to do PT.  -     gabapentin (NEURONTIN) 100 MG capsule; Take one tab by mouth nightly for 2 weeks then take two tabs by mouth nightly.    Nasal sore  See Dermatology if non healing sore.  Pt verbalized understanding.  -     mupirocin (BACTROBAN) 2 % ointment; Apply topically once daily.    Colon  cancer screening  -     Ambulatory referral to Gastroenterology    Other orders  -     Influenza - Quadrivalent (3 years & older) (PF)      Patient readiness: acceptance and barriers:none    During the course of the visit the patient was educated and counseled about the following:     Hypertension:   Medication: no change.    Goals: Hypertension: Reduce Blood Pressure    Did patient meet goals/outcomes: Yes    The following self management tools provided: declined    Patient Instructions (the written plan) was given to the patient/family.     Time spent with patient: 30 minutes    Barriers to medications present (no )    Adverse reactions to current medications (no)    Over the counter medications reviewed (Yes)      RTC 6 mos with new PCP.

## 2018-10-30 DIAGNOSIS — D72.829 LEUKOCYTOSIS, UNSPECIFIED TYPE: Primary | ICD-10-CM

## 2018-11-06 ENCOUNTER — OUTPATIENT CASE MANAGEMENT (OUTPATIENT)
Dept: ADMINISTRATIVE | Facility: OTHER | Age: 63
End: 2018-11-06

## 2018-11-06 NOTE — PROGRESS NOTES
Please note this patients information has been forwarded to [facility] for Case Management and/or . I spoke with April in Case Mgmt.    Please contact hospitals at ext. [phone] with questions.      Thank you,    Velma Molina, JD [facility]  Outpatient Care Mgmt.  [phone]

## 2018-11-07 ENCOUNTER — HOSPITAL ENCOUNTER (OUTPATIENT)
Dept: RESPIRATORY THERAPY | Facility: HOSPITAL | Age: 63
Discharge: HOME | End: 2018-11-07
Attending: INTERNAL MEDICINE
Payer: COMMERCIAL

## 2018-11-07 DIAGNOSIS — J44.9 CHRONIC OBSTRUCTIVE PULMONARY DISEASE, UNSPECIFIED COPD TYPE: Chronic | ICD-10-CM

## 2018-11-07 PROCEDURE — 94375 RESPIRATORY FLOW VOLUME LOOP: CPT

## 2018-11-07 PROCEDURE — 94060 EVALUATION OF WHEEZING: CPT

## 2018-11-07 PROCEDURE — 94729 DIFFUSING CAPACITY: CPT

## 2018-11-09 LAB
BRPFT: NORMAL
DLCO ADJ PRE: 16.17 ML/(MIN*MMHG)
DLCO SINGLE BREATH LLN: 19.76
DLCO SINGLE BREATH PRE REF: 63.4 %
DLCO SINGLE BREATH REF: 25.49
DLCOC SBVA LLN: 3.17
DLCOC SBVA PRE REF: 84.6 %
DLCOC SBVA REF: 4.43
DLCOC SINGLE BREATH LLN: 19.76
DLCOC SINGLE BREATH PRE REF: 63.4 %
DLCOC SINGLE BREATH REF: 25.49
DLCOVA LLN: 3.17
DLCOVA PRE REF: 84.6 %
DLCOVA PRE: 3.75 ML/(MIN*MMHG*L)
DLCOVA REF: 4.43
DLVAADJ PRE: 3.75 ML/(MIN*MMHG*L)
ERV LLN: 0.82
ERV REF: 0.82
ERVN2 LLN: 0.82
ERVN2 PRE REF: 46.3 %
ERVN2 PRE: 0.38 L
ERVN2 REF: 0.82
FEF 25 75 CHG: -26.3 %
FEF 25 75 LLN: 1.21
FEF 25 75 POST REF: 14.8 %
FEF 25 75 PRE REF: 20.1 %
FEF 25 75 REF: 2.42
FET100 CHG: 22.5 %
FEV1 CHG: -13 %
FEV1 FVC CHG: -7.8 %
FEV1 FVC LLN: 66
FEV1 FVC POST REF: 54.8 %
FEV1 FVC PRE REF: 59.4 %
FEV1 FVC REF: 78
FEV1 LLN: 2.14
FEV1 POST REF: 33.4 %
FEV1 PRE REF: 38.4 %
FEV1 REF: 2.85
FEV6 CHG: -11.4 %
FEV6 LLN: 2.95
FEV6 POST REF: 53.7 %
FEV6 POST: 2 L
FEV6 PRE REF: 60.6 %
FEV6 PRE: 2.26 L
FEV6 REF: 3.73
FRCN2 LLN: 2.16
FRCN2 PRE REF: 100.2 %
FRCN2 REF: 2.98
FRCPLETH LLN: 2.16
FRCPLETH REF: 2.98
FVC CHG: -5.7 %
FVC LLN: 2.76
FVC POST REF: 60.4 %
FVC PRE REF: 64 %
FVC REF: 3.67
IVC PRE: 2.52 L
IVC SINGLE BREATH LLN: 2.76
IVC SINGLE BREATH PRE REF: 68.9 %
IVC SINGLE BREATH REF: 3.67
MVV LLN: 94
MVV PRE REF: 37.1 %
MVV REF: 110
PEF CHG: 6.2 %
PEF LLN: 4.98
PEF POST REF: 43.8 %
PEF PRE REF: 41.2 %
PEF REF: 6.96
POST FEF 25 75: 0.36 L/S
POST FET 100: 8.45 SEC
POST FEV1 FVC: 43 %
POST FEV1: 0.95 L
POST FVC: 2.21 L
POST PEF: 3.05 L/S
PRE DLCO: 16.17 ML/(MIN*MMHG)
PRE FEF 25 75: 0.49 L/S
PRE FET 100: 6.9 SEC
PRE FEV1 FVC: 46.61 %
PRE FEV1: 1.09 L
PRE FRC N2: 2.99 L
PRE FVC: 2.35 L
PRE MVV: 41 L/MIN
PRE PEF: 2.87 L/S
RAW LLN: 3.06
RAW REF: 3.06
RV LLN: 1.58
RV REF: 2.16
RVN2 LLN: 1.58
RVN2 PRE REF: 120.9 %
RVN2 PRE: 2.61 L
RVN2 REF: 2.16
RVN2TLCN2 LLN: 30
RVN2TLCN2 PRE REF: 125.5 %
RVN2TLCN2 PRE: 50.23 %
RVN2TLCN2 REF: 40
RVTLC LLN: 30
RVTLC REF: 40
TLC LLN: 4.77
TLC REF: 5.76
TLCN2 LLN: 4.77
TLCN2 PRE REF: 90.2 %
TLCN2 PRE: 5.2 L
TLCN2 REF: 5.76
VA PRE: 4.34 L
VA SINGLE BREATH LLN: 5.61
VA SINGLE BREATH PRE REF: 77.3 %
VA SINGLE BREATH REF: 5.61
VC LLN: 2.76
VC REF: 3.67
VCMAXN2 LLN: 2.76
VCMAXN2 PRE REF: 70.6 %
VCMAXN2 PRE: 2.59 L
VCMAXN2 REF: 3.67

## 2018-11-14 ENCOUNTER — OFFICE VISIT (OUTPATIENT)
Dept: PULMONOLOGY | Facility: CLINIC | Age: 63
End: 2018-11-14
Payer: COMMERCIAL

## 2018-11-14 VITALS
HEIGHT: 69 IN | WEIGHT: 164.69 LBS | SYSTOLIC BLOOD PRESSURE: 142 MMHG | OXYGEN SATURATION: 96 % | DIASTOLIC BLOOD PRESSURE: 74 MMHG | HEART RATE: 58 BPM | BODY MASS INDEX: 24.39 KG/M2

## 2018-11-14 DIAGNOSIS — J44.89 COPD WITH ASTHMA: Primary | ICD-10-CM

## 2018-11-14 DIAGNOSIS — R41.3 MEMORY LOSS OF UNKNOWN CAUSE: ICD-10-CM

## 2018-11-14 PROCEDURE — 99999 PR PBB SHADOW E&M-EST. PATIENT-LVL III: CPT | Mod: PBBFAC,,, | Performed by: INTERNAL MEDICINE

## 2018-11-14 PROCEDURE — 99214 OFFICE O/P EST MOD 30 MIN: CPT | Mod: S$GLB,,, | Performed by: INTERNAL MEDICINE

## 2018-11-14 PROCEDURE — 3008F BODY MASS INDEX DOCD: CPT | Mod: S$GLB,,, | Performed by: INTERNAL MEDICINE

## 2018-11-14 RX ORDER — MONTELUKAST SODIUM 10 MG/1
10 TABLET ORAL NIGHTLY
Qty: 30 TABLET | Refills: 0 | Status: SHIPPED | OUTPATIENT
Start: 2018-11-14 | End: 2018-12-07 | Stop reason: SDUPTHER

## 2018-11-14 RX ORDER — FLUTICASONE FUROATE AND VILANTEROL 200; 25 UG/1; UG/1
1 POWDER RESPIRATORY (INHALATION) DAILY
Qty: 1 EACH | Refills: 5 | Status: SHIPPED | OUTPATIENT
Start: 2018-11-14 | End: 2019-02-14 | Stop reason: SDUPTHER

## 2018-11-14 NOTE — PROGRESS NOTES
11/14/2018    Abbey Hagan  Office Note    Chief Complaint   Patient presents with    Follow-up     3 month    Results     PFT    COPD       HPI: still currently smoking 10/day,  Cough daily, productive clear, worse at night. Took prednisone once in last 3 months. . Using trelegy daily, Albuterol inhaler use 1-3 x daily as preventative state SOB once weeking,  States some short term memory loss, can not remember previous days activities. States large amounts of stress due to caring for dependent mother. Several night time awakenings to care for mother who has a bell used to summon her.    Previous HPI Dr. Cruz: 8/14/18- pt with onset lung dx age 41's.  Has cough and wheezes - noturnally worse.  No seasonal variation.  Still smokes.  Worked as  17 yrs.  No sinus problems. [place]    Exacerbates 2x/y, took prednisone started 4 days ago.  No sinus.           The chief compliant  problem is new to me  PFSH:  Past Medical History:   Diagnosis Date    Anxiety     Arthritis     back, neck and knees    Depression     Emphysema of lung     HSV infection     Hypertension          Past Surgical History:   Procedure Laterality Date    ARTHROPLASTY-HIP Right 3/14/2017    [facility] OR    COLONOSCOPY N/A 5/20/2013    [facility] Bubba Tyler [facility]    HIP ARTHROPLASTY      bilat    INJECTION-STEROID-EPIDURAL-TRANSFORAMINAL Left 12/9/2014    [facility]    JOINT REPLACEMENT      bilat hip     Social History     Tobacco Use    Smoking status: Current Every Day Smoker     Packs/day: 0.50     Years: 40.00     Pack years: 20.00     Types: Cigarettes    Smokeless tobacco: Never Used    Tobacco comment: Patient down to 8 cigs a day   Substance Use Topics    Alcohol use: Yes     Frequency: 2-3 times a week     Drinks per session: 1 or 2     Binge frequency: Never     Comment: socially    Drug use: Yes     Types: Oxycodone      Family History   Problem Relation Age of Onset    COPD Mother     Hypertension Mother     Heart disease Mother     Alcohol abuse Father     Depression Sister     Hypertension Sister     Colon cancer Maternal Grandmother     Breast cancer Paternal Aunt     Ovarian cancer Neg Hx     Psoriasis Neg Hx     Lupus Neg Hx     Eczema Neg Hx     Melanoma Neg Hx      Review of patient's allergies indicates:   Allergen Reactions    Opioids-meperidine and related      Urine toxicology positive for THC    Penicillin g      Childhood allergy--unknown reaction    Sulfa (sulfonamide antibiotics)      Childhood allergy--unknown reaction     I have reviewed past medical, family, and social history. I have reviewed previous nurse notes.    Performance Status:The patient's activity level is functions out of house.          Review of Systems   Constitutional: Negative for activity change, appetite change, chills, diaphoresis, fatigue, fever and unexpected weight change.   HENT: Negative for dental problem, postnasal drip, rhinorrhea, sinus pressure, sinus pain, sneezing, sore throat, trouble swallowing and voice change.    Respiratory: Negative for apnea, and stridor.  Positive for cough, chest tightness, shortness of breath, wheezing   Cardiovascular: Negative for chest pain, palpitations and leg swelling.   Gastrointestinal: Negative for abdominal distention, abdominal pain, constipation and nausea.   Musculoskeletal: Negative for gait problem, myalgias and neck pain.   Skin: Negative for color change and pallor.   Allergic/Immunologic: Negative for environmental allergies and food allergies.   Neurological: Negative for dizziness, speech difficulty, weakness, light-headedness, numbness and headaches.   Hematological: Negative for adenopathy. Does not bruise/bleed easily.   Psychiatric/Behavioral: Negative for dysphoric mood and sleep disturbance. The patient is not nervous/anxious.           Exam:Comprehensive exam  "done. BP (!) 142/74 (BP Location: Right arm, Patient Position: Sitting)   Pulse (!) 58   Ht 5' 9" (1.753 m)   Wt 74.7 kg (164 lb 10.9 oz)   LMP 12/06/1988   SpO2 96% Comment: on room air  BMI 24.32 kg/m²   Exam included Vitals as listed  Constitutional: He is oriented to person, place, and time. He appears well-developed. No distress.   Nose: Nose normal.   Mouth/Throat: Uvula is midline, oropharynx is clear and moist and mucous membranes are normal. No dental caries. No oropharyngeal exudate, posterior oropharyngeal edema, posterior oropharyngeal erythema or tonsillar abscesses.  M[place] (M) score 2  Eyes: Pupils are equal, round, and reactive to light.   Neck: No JVD present. No thyromegaly present.   Cardiovascular: Normal rate, regular rhythm and normal heart sounds. Exam reveals no gallop and no friction rub.   No murmur heard.  Pulmonary/Chest: Effort normal and breath sounds abnormal: bilateral wheezes. No accessory muscle usage or stridor. No apnea and no tachypnea. No respiratory distress, decreased breath sounds, rhonchi, rales, or tenderness.   Abdominal: Soft. He exhibits no mass. There is no tenderness. No hepatosplenomegaly, hernias and normoactive bowel sounds  Musculoskeletal: Normal range of motion. exhibits no edema.   Lymphadenopathy:     He has no cervical adenopathy.     He has no axillary adenopathy.   Neurological:  alert and oriented to person, place, and time. not disoriented.   Skin: Skin is warm and dry. Capillary refill takes less 2 sec. No cyanosis or erythema. No pallor. Nails show slight clubbing.   Psychiatric: normal mood and affect. behavior is normal. Judgment and thought content normal.       Radiographs (ct chest and cxr) reviewed: results reviewed   XR CHEST PA AND LATERAL  FINDINGS:  There is linear scarring in the right infra hilum.  The aorta is mildly tortuous.  Degenerative changes are seen in the spine.  No confluent infiltrates are identified.  There is mild " hyper aeration suggesting COPD.      Impression       No definite acute cardiopulmonary disease      Electronically signed by: Venkatesh Malagon MD  Date: 08/14/2018  Time: 16:01         Labs reviewed       Lab Results   Component Value Date    WBC 13.54 (H) 10/29/2018    RBC 4.60 10/29/2018    HGB 14.8 10/29/2018    HCT 45.3 10/29/2018    MCV 99 (H) 10/29/2018    MCH 32.2 (H) 10/29/2018    MCHC 32.7 10/29/2018    RDW 12.5 10/29/2018     10/29/2018    MPV 11.1 10/29/2018    GRAN 10.6 (H) 10/29/2018    GRAN 78.3 (H) 10/29/2018    LYMPH 1.5 10/29/2018    LYMPH 11.4 (L) 10/29/2018    MONO 1.3 (H) 10/29/2018    MONO 9.4 10/29/2018    EOS 0.1 10/29/2018    BASO 0.04 10/29/2018    EOSINOPHIL 0.4 10/29/2018    BASOPHIL 0.3 10/29/2018       PFT results reviewed  Pulmonary Functions Testing Results:  11/7/18  Spirometry with bronchodilator, lung volume by gas dilution, and diffusion capacity were measured November 7, 2018.  The FEV1 to FVC ratio was 47%.  The FEV1 itself is 38% or 1.1 L.  There was no improvement following bronchodilator.  These values   represent severe airflow obstruction.  Total lung capacity is normal at 90% of predicted.  Residual volume is elevated to 121% of predicted.  There is air trapping.  Diffusion time is reduced but done improves when corrected for lung volumes.  Overall   impression is that patient has very severe airflow obstruction, no significant bronchodilator response, air trapping, and diffusion is somewhat intact.     Mini Mental Status exam in clinic: 30/30 no signs of dementia    Total face-to-face time with the patient was 40 minutes and greater than 50% was spent in counseling and coordination of care. The above assessment and plan have been discussed at length. Labs and procedure reviewed. Problem List updated.     Plan:  Clinical impression is resonably certain and repeated evaluation prn +/- follow up will be needed as below.    Abbey was seen today for follow-up, results  and copd.    Diagnoses and all orders for this visit:    COPD with asthma  -     fluticasone-vilanterol (BREO ELLIPTA) 200-25 mcg/dose DsDv diskus inhaler; Inhale 1 puff into the lungs once daily. Controller  -     montelukast (SINGULAIR) 10 mg tablet; Take 1 tablet (10 mg total) by mouth every evening.    Memory loss of unknown cause        Follow-up in about 3 months (around 2/14/2019), or if symptoms worsen or fail to improve.    Discussed with patient above for education the following:      Patient Instructions   Stop Trelegy     Start Breo 200 one puff daily  Start Singulair 10mg nightly    Use prednisone 20 mg a day for three days for shortness of breath and wheezing, repeat when needed    Stop smoking     Memory is intact, short term loss most likely due to stress. Continue Lexapro, consider alternate therapy if depression not controlled.    Sleep is difficult while caring for dependent mother.

## 2018-11-14 NOTE — PATIENT INSTRUCTIONS
Stop Trelegy     Start Breo 200 one puff daily  Start Singulair 10mg nightly    Use prednisone 20 mg a day for three days for shortness of breath and wheezing, repeat when needed    Stop smoking     Memory is intact, short term loss most likely due to stress. Continue Lexapro, consider alternate therapy if depression not controlled.    Sleep is difficult while caring for dependent mother.

## 2018-12-07 DIAGNOSIS — J44.89 COPD WITH ASTHMA: ICD-10-CM

## 2018-12-07 RX ORDER — MONTELUKAST SODIUM 10 MG/1
10 TABLET ORAL NIGHTLY
Qty: 30 TABLET | Refills: 11 | Status: SHIPPED | OUTPATIENT
Start: 2018-12-07 | End: 2019-02-14

## 2019-01-10 DIAGNOSIS — F32.A DEPRESSION, UNSPECIFIED DEPRESSION TYPE: ICD-10-CM

## 2019-01-10 RX ORDER — ESCITALOPRAM OXALATE 20 MG/1
TABLET ORAL
Qty: 30 TABLET | Refills: 0 | Status: SHIPPED | OUTPATIENT
Start: 2019-01-10 | End: 2019-02-03 | Stop reason: SDUPTHER

## 2019-02-03 DIAGNOSIS — F32.A DEPRESSION, UNSPECIFIED DEPRESSION TYPE: ICD-10-CM

## 2019-02-04 RX ORDER — ESCITALOPRAM OXALATE 20 MG/1
TABLET ORAL
Qty: 30 TABLET | Refills: 0 | Status: SHIPPED | OUTPATIENT
Start: 2019-02-04 | End: 2019-04-01 | Stop reason: SDUPTHER

## 2019-02-14 ENCOUNTER — OFFICE VISIT (OUTPATIENT)
Dept: PULMONOLOGY | Facility: CLINIC | Age: 64
End: 2019-02-14
Payer: COMMERCIAL

## 2019-02-14 VITALS
BODY MASS INDEX: 24.29 KG/M2 | OXYGEN SATURATION: 95 % | HEIGHT: 69 IN | SYSTOLIC BLOOD PRESSURE: 135 MMHG | HEART RATE: 84 BPM | WEIGHT: 164 LBS | DIASTOLIC BLOOD PRESSURE: 84 MMHG

## 2019-02-14 DIAGNOSIS — J43.9 PULMONARY EMPHYSEMA, UNSPECIFIED EMPHYSEMA TYPE: Chronic | ICD-10-CM

## 2019-02-14 DIAGNOSIS — J44.89 COPD WITH ASTHMA: Primary | ICD-10-CM

## 2019-02-14 DIAGNOSIS — J44.9 CHRONIC OBSTRUCTIVE PULMONARY DISEASE, UNSPECIFIED COPD TYPE: Chronic | ICD-10-CM

## 2019-02-14 PROCEDURE — 3008F BODY MASS INDEX DOCD: CPT | Mod: S$GLB,,, | Performed by: NURSE PRACTITIONER

## 2019-02-14 PROCEDURE — 99999 PR PBB SHADOW E&M-EST. PATIENT-LVL III: ICD-10-PCS | Mod: PBBFAC,,, | Performed by: NURSE PRACTITIONER

## 2019-02-14 PROCEDURE — 99213 PR OFFICE/OUTPT VISIT, EST, LEVL III, 20-29 MIN: ICD-10-PCS | Mod: S$GLB,,, | Performed by: NURSE PRACTITIONER

## 2019-02-14 PROCEDURE — 99213 OFFICE O/P EST LOW 20 MIN: CPT | Mod: S$GLB,,, | Performed by: NURSE PRACTITIONER

## 2019-02-14 PROCEDURE — 99999 PR PBB SHADOW E&M-EST. PATIENT-LVL III: CPT | Mod: PBBFAC,,, | Performed by: NURSE PRACTITIONER

## 2019-02-14 PROCEDURE — 3008F PR BODY MASS INDEX (BMI) DOCUMENTED: ICD-10-PCS | Mod: S$GLB,,, | Performed by: NURSE PRACTITIONER

## 2019-02-14 RX ORDER — AZITHROMYCIN 500 MG/1
500 TABLET, FILM COATED ORAL DAILY
Qty: 3 TABLET | Refills: 2 | Status: SHIPPED | OUTPATIENT
Start: 2019-02-14 | End: 2019-08-14 | Stop reason: SDUPTHER

## 2019-02-14 RX ORDER — FLUTICASONE FUROATE AND VILANTEROL 200; 25 UG/1; UG/1
1 POWDER RESPIRATORY (INHALATION) DAILY
Qty: 1 EACH | Refills: 11 | Status: SHIPPED | OUTPATIENT
Start: 2019-02-14 | End: 2020-08-25 | Stop reason: ALTCHOICE

## 2019-02-14 RX ORDER — ALBUTEROL SULFATE 90 UG/1
2 AEROSOL, METERED RESPIRATORY (INHALATION) EVERY 4 HOURS PRN
Qty: 3 INHALER | Refills: 3 | Status: SHIPPED | OUTPATIENT
Start: 2019-02-14 | End: 2019-05-27 | Stop reason: SDUPTHER

## 2019-02-14 NOTE — PROGRESS NOTES
2/14/2019    Abbey Hagan  Office Note    Chief Complaint   Patient presents with    Follow-up     3 month    Shortness of Breath       HPI:   02/14/2019- went to Urgent care for neck pain tx with ultram and flexeril, Breathing unchanged, smoking unchanged,  Currently using Breo, albuterol inhaler 1-3x daily. Memory loss not as bad.  Cough unchanged. Had sinus issues few weeks prior took azithromycin, resolved. No recent prednisone use.    11/14/2018- still currently smoking 10/day,  Cough daily, productive clear, worse at night. Took prednisone once in last 3 months. . Using trelegy daily, Albuterol inhaler use 1-3 x daily as preventative state SOB once weeking,  States some short term memory loss, can not remember previous days activities. States large amounts of stress due to caring for dependent mother. Several night time awakenings to care for mother who has a bell used to summon her.    Previous HPI Dr. Cruz: 8/14/18- pt with onset lung dx age 41's.  Has cough and wheezes - noturnally worse.  No seasonal variation.  Still smokes.  Worked as  17 yrs.  No sinus problems. [place]    Exacerbates 2x/y, took prednisone started 4 days ago.  No sinus.           The chief compliant  problem varies with instablilty at time    PFSH:  Past Medical History:   Diagnosis Date    Anxiety     Arthritis     back, neck and knees    Depression     Emphysema of lung     HSV infection     Hypertension          Past Surgical History:   Procedure Laterality Date    ARTHROPLASTY-HIP Right 3/14/2017    [facility]    COLONOSCOPY N/A 5/20/2013    [facility]    HIP ARTHROPLASTY      bilat    INJECTION-STEROID-EPIDURAL-TRANSFORAMINAL Left 12/9/2014    [facility]    JOINT REPLACEMENT      bilat hip     Social History     Tobacco Use    Smoking status: Current Every Day Smoker     Packs/day: 0.50     Years:  40.00     Pack years: 20.00     Types: Cigarettes    Smokeless tobacco: Never Used    Tobacco comment: Patient down to 8 cigs a day   Substance Use Topics    Alcohol use: Yes     Frequency: 2-3 times a week     Drinks per session: 1 or 2     Binge frequency: Never     Comment: socially    Drug use: Yes     Types: Oxycodone     Family History   Problem Relation Age of Onset    COPD Mother     Hypertension Mother     Heart disease Mother     Alcohol abuse Father     Depression Sister     Hypertension Sister     Colon cancer Maternal Grandmother     Breast cancer Paternal Aunt     Ovarian cancer Neg Hx     Psoriasis Neg Hx     Lupus Neg Hx     Eczema Neg Hx     Melanoma Neg Hx      Review of patient's allergies indicates:   Allergen Reactions    Opioids-meperidine and related      Urine toxicology positive for THC    Penicillin g      Childhood allergy--unknown reaction    Sulfa (sulfonamide antibiotics)      Childhood allergy--unknown reaction     I have reviewed past medical, family, and social history. I have reviewed previous nurse notes.    Performance Status:The patient's activity level is functions out of house.          Review of Systems   Constitutional: Negative for activity change, appetite change, chills, diaphoresis, fatigue, fever and unexpected weight change.   HENT: Negative for dental problem, postnasal drip, rhinorrhea, sinus pressure, sinus pain, sneezing, sore throat, trouble swallowing and voice change.    Respiratory: Negative for apnea, and stridor.  Negative for cough, chest tightness, SOB, and wheeze.  Cardiovascular: Negative for chest pain, palpitations and leg swelling.   Gastrointestinal: Negative for abdominal distention, abdominal pain, constipation and nausea.   Musculoskeletal: Negative for gait problem, myalgias and neck pain.   Skin: Negative for color change and pallor.   Allergic/Immunologic: Negative for environmental allergies and food allergies.  "  Neurological: Negative for dizziness, speech difficulty, weakness, light-headedness, numbness and headaches.   Hematological: Negative for adenopathy. Does not bruise/bleed easily.   Psychiatric/Behavioral: Negative for dysphoric mood and sleep disturbance. The patient is not nervous/anxious.           Exam:Comprehensive exam done. /84 (BP Location: Left arm, Patient Position: Sitting)   Pulse 84   Ht 5' 9" (1.753 m)   Wt 74.4 kg (164 lb 0.4 oz)   LMP 12/06/1988   SpO2 95% Comment: on room air  BMI 24.22 kg/m²   Exam included Vitals as listed  Constitutional: He is oriented to person, place, and time. He appears well-developed. No distress.   Nose: Nose normal.   Mouth/Throat: Uvula is midline, oropharynx is clear and moist and mucous membranes are normal. No dental caries. No oropharyngeal exudate, posterior oropharyngeal edema, posterior oropharyngeal erythema or tonsillar abscesses.  Mallapatti (M) score 2  Eyes: Pupils are equal, round, and reactive to light.   Neck: No JVD present. No thyromegaly present.   Cardiovascular: Normal rate, regular rhythm and normal heart sounds. Exam reveals no gallop and no friction rub.   No murmur heard.  Pulmonary/Chest: Effort normal and breath sounds slight left lower lung field wheeze. No accessory muscle usage or stridor. No apnea and no tachypnea. No respiratory distress, decreased breath sounds, rhonchi, rales, or tenderness.   Abdominal: Soft. He exhibits no mass. There is no tenderness. No hepatosplenomegaly, hernias and normoactive bowel sounds  Musculoskeletal: Normal range of motion. exhibits no edema.   Lymphadenopathy:     He has no cervical adenopathy.     He has no axillary adenopathy.   Neurological:  alert and oriented to person, place, and time. not disoriented.   Skin: Skin is warm and dry. Capillary refill takes less 2 sec. No cyanosis or erythema. No pallor. Nails show slight clubbing.   Psychiatric: normal mood and affect. behavior is normal. " Judgment and thought content normal.       Radiographs (ct chest and cxr) reviewed: results reviewed   XR CHEST PA AND LATERAL  FINDINGS:  There is linear scarring in the right infra hilum.  The aorta is mildly tortuous.  Degenerative changes are seen in the spine.  No confluent infiltrates are identified.  There is mild hyper aeration suggesting COPD.      Impression       No definite acute cardiopulmonary disease      Electronically signed by: Venkatesh Malagon MD  Date: 08/14/2018  Time: 16:01         Labs reviewed       Lab Results   Component Value Date    WBC 13.54 (H) 10/29/2018    RBC 4.60 10/29/2018    HGB 14.8 10/29/2018    HCT 45.3 10/29/2018    MCV 99 (H) 10/29/2018    MCH 32.2 (H) 10/29/2018    MCHC 32.7 10/29/2018    RDW 12.5 10/29/2018     10/29/2018    MPV 11.1 10/29/2018    GRAN 10.6 (H) 10/29/2018    GRAN 78.3 (H) 10/29/2018    LYMPH 1.5 10/29/2018    LYMPH 11.4 (L) 10/29/2018    MONO 1.3 (H) 10/29/2018    MONO 9.4 10/29/2018    EOS 0.1 10/29/2018    BASO 0.04 10/29/2018    EOSINOPHIL 0.4 10/29/2018    BASOPHIL 0.3 10/29/2018       PFT results reviewed  Pulmonary Functions Testing Results:  11/7/18  Spirometry with bronchodilator, lung volume by gas dilution, and diffusion capacity were measured November 7, 2018.  The FEV1 to FVC ratio was 47%.  The FEV1 itself is 38% or 1.1 L.  There was no improvement following bronchodilator.  These values   represent severe airflow obstruction.  Total lung capacity is normal at 90% of predicted.  Residual volume is elevated to 121% of predicted.  There is air trapping.  Diffusion time is reduced but done improves when corrected for lung volumes.  Overall   impression is that patient has very severe airflow obstruction, no significant bronchodilator response, air trapping, and diffusion is somewhat intact.     Mini Mental Status exam in clinic: 30/30 no signs of dementia    Total face-to-face time with the patient was 40 minutes and greater than 50% was  spent in counseling and coordination of care. The above assessment and plan have been discussed at length. Labs and procedure reviewed. Problem List updated.     Plan:  Clinical impression is resonably certain and repeated evaluation prn +/- follow up will be needed as below.    Abbey was seen today for follow-up and shortness of breath.    Diagnoses and all orders for this visit:    COPD with asthma  -     azithromycin (ZITHROMAX) 500 MG tablet; Take 1 tablet (500 mg total) by mouth once daily.  -     fluticasone-vilanterol (BREO ELLIPTA) 200-25 mcg/dose DsDv diskus inhaler; Inhale 1 puff into the lungs once daily. Controller    Pulmonary emphysema, unspecified emphysema type  -     albuterol (VENTOLIN HFA) 90 mcg/actuation inhaler; Inhale 2 puffs into the lungs every 4 (four) hours as needed for Wheezing or Shortness of Breath. Rescue  -     fluticasone-vilanterol (BREO ELLIPTA) 200-25 mcg/dose DsDv diskus inhaler; Inhale 1 puff into the lungs once daily. Controller    Chronic obstructive pulmonary disease, unspecified COPD type  -     albuterol (VENTOLIN HFA) 90 mcg/actuation inhaler; Inhale 2 puffs into the lungs every 4 (four) hours as needed for Wheezing or Shortness of Breath. Rescue        Follow-up in about 6 months (around 8/14/2019), or if symptoms worsen or fail to improve.    Discussed with patient above for education the following:      Patient Instructions   Continue current COPD/Asthma therapy    Singulair not beneficial ok to discontinue    Asthma Action plan    Azithromycin 500 mg 1 pill for three days for yellow or green mucous    Prednisone 20 mg pills, Take one pill a day for three days, repeat for shortness of breath or wheeze    Albuterol Inhaler 1-2 puffs every 4 hours, for cough or shortness of breath

## 2019-03-03 DIAGNOSIS — M54.31 BILATERAL SCIATICA: ICD-10-CM

## 2019-03-03 DIAGNOSIS — M54.32 BILATERAL SCIATICA: ICD-10-CM

## 2019-03-04 RX ORDER — GABAPENTIN 100 MG/1
CAPSULE ORAL
Qty: 60 CAPSULE | Refills: 0 | Status: SHIPPED | OUTPATIENT
Start: 2019-03-04 | End: 2019-08-28 | Stop reason: ALTCHOICE

## 2019-04-01 DIAGNOSIS — F32.A DEPRESSION, UNSPECIFIED DEPRESSION TYPE: ICD-10-CM

## 2019-04-01 DIAGNOSIS — M54.32 BILATERAL SCIATICA: ICD-10-CM

## 2019-04-01 DIAGNOSIS — M54.31 BILATERAL SCIATICA: ICD-10-CM

## 2019-04-01 RX ORDER — ESCITALOPRAM OXALATE 20 MG/1
TABLET ORAL
Qty: 30 TABLET | Refills: 0 | OUTPATIENT
Start: 2019-04-01

## 2019-04-01 RX ORDER — GABAPENTIN 100 MG/1
CAPSULE ORAL
Qty: 60 CAPSULE | Refills: 5 | Status: SHIPPED | OUTPATIENT
Start: 2019-04-01 | End: 2019-10-15 | Stop reason: SDUPTHER

## 2019-04-01 RX ORDER — ESCITALOPRAM OXALATE 20 MG/1
TABLET ORAL
Qty: 30 TABLET | Refills: 0 | Status: SHIPPED | OUTPATIENT
Start: 2019-04-01 | End: 2019-04-27 | Stop reason: SDUPTHER

## 2019-04-27 DIAGNOSIS — F32.A DEPRESSION, UNSPECIFIED DEPRESSION TYPE: ICD-10-CM

## 2019-04-29 RX ORDER — ESCITALOPRAM OXALATE 20 MG/1
TABLET ORAL
Qty: 30 TABLET | Refills: 0 | Status: SHIPPED | OUTPATIENT
Start: 2019-04-29 | End: 2019-05-26 | Stop reason: SDUPTHER

## 2019-05-26 DIAGNOSIS — F32.A DEPRESSION, UNSPECIFIED DEPRESSION TYPE: ICD-10-CM

## 2019-05-27 DIAGNOSIS — J44.9 CHRONIC OBSTRUCTIVE PULMONARY DISEASE, UNSPECIFIED COPD TYPE: Chronic | ICD-10-CM

## 2019-05-27 DIAGNOSIS — J43.9 PULMONARY EMPHYSEMA, UNSPECIFIED EMPHYSEMA TYPE: Chronic | ICD-10-CM

## 2019-05-27 RX ORDER — ALBUTEROL SULFATE 90 UG/1
2 AEROSOL, METERED RESPIRATORY (INHALATION) EVERY 4 HOURS PRN
Qty: 3 INHALER | Refills: 3 | Status: SHIPPED | OUTPATIENT
Start: 2019-05-27 | End: 2019-11-26 | Stop reason: SDUPTHER

## 2019-05-27 RX ORDER — ESCITALOPRAM OXALATE 20 MG/1
TABLET ORAL
Qty: 30 TABLET | Refills: 0 | Status: SHIPPED | OUTPATIENT
Start: 2019-05-27 | End: 2019-06-23 | Stop reason: SDUPTHER

## 2019-06-23 DIAGNOSIS — F32.A DEPRESSION, UNSPECIFIED DEPRESSION TYPE: ICD-10-CM

## 2019-06-24 RX ORDER — ESCITALOPRAM OXALATE 20 MG/1
TABLET ORAL
Qty: 30 TABLET | Refills: 0 | Status: SHIPPED | OUTPATIENT
Start: 2019-06-24 | End: 2019-07-20 | Stop reason: SDUPTHER

## 2019-06-24 NOTE — TELEPHONE ENCOUNTER
Prescription filled for 30 days only. Patient needs appt in primary care for further refills, LOV was Oct 2018 with instructions to follow up and establish with PCP in 6 months. She has no appt scheduled.   Thanks.  Rashmi

## 2019-07-20 DIAGNOSIS — F32.A DEPRESSION, UNSPECIFIED DEPRESSION TYPE: ICD-10-CM

## 2019-07-22 RX ORDER — ESCITALOPRAM OXALATE 20 MG/1
TABLET ORAL
Qty: 30 TABLET | Refills: 0 | Status: SHIPPED | OUTPATIENT
Start: 2019-07-22 | End: 2019-08-17 | Stop reason: SDUPTHER

## 2019-07-22 NOTE — TELEPHONE ENCOUNTER
I refilled this rx for lexapro when Yany Mandel was on vacation. Message was sent to her staff that patient needed appointment for future refills. It does not look like anyone contacted the patient but the message is signed. She needs an appointment to establish with a new PCP. Once scheduled, I will consider refilling the medication. Pended for now.  Thanks.  Rashmi

## 2019-08-14 ENCOUNTER — LAB VISIT (OUTPATIENT)
Dept: LAB | Facility: HOSPITAL | Age: 64
End: 2019-08-14
Attending: NURSE PRACTITIONER
Payer: COMMERCIAL

## 2019-08-14 ENCOUNTER — OFFICE VISIT (OUTPATIENT)
Dept: PULMONOLOGY | Facility: CLINIC | Age: 64
End: 2019-08-14
Payer: COMMERCIAL

## 2019-08-14 VITALS
WEIGHT: 165.38 LBS | DIASTOLIC BLOOD PRESSURE: 87 MMHG | HEART RATE: 66 BPM | SYSTOLIC BLOOD PRESSURE: 132 MMHG | BODY MASS INDEX: 24.5 KG/M2 | HEIGHT: 69 IN | OXYGEN SATURATION: 95 %

## 2019-08-14 DIAGNOSIS — M25.421 SWELLING OF RIGHT ELBOW JOINT: Primary | ICD-10-CM

## 2019-08-14 DIAGNOSIS — J44.9 CHRONIC OBSTRUCTIVE PULMONARY DISEASE, UNSPECIFIED COPD TYPE: Chronic | ICD-10-CM

## 2019-08-14 DIAGNOSIS — M25.421 SWELLING OF RIGHT ELBOW JOINT: ICD-10-CM

## 2019-08-14 DIAGNOSIS — D72.829 LEUKOCYTOSIS, UNSPECIFIED TYPE: ICD-10-CM

## 2019-08-14 DIAGNOSIS — J44.89 COPD WITH ASTHMA: ICD-10-CM

## 2019-08-14 LAB
ALBUMIN SERPL BCP-MCNC: 4.2 G/DL (ref 3.5–5.2)
ALP SERPL-CCNC: 59 U/L (ref 55–135)
ALT SERPL W/O P-5'-P-CCNC: 21 U/L (ref 10–44)
ANION GAP SERPL CALC-SCNC: 9 MMOL/L (ref 8–16)
AST SERPL-CCNC: 26 U/L (ref 10–40)
BASOPHILS # BLD AUTO: 0.05 K/UL (ref 0–0.2)
BASOPHILS # BLD AUTO: 0.05 K/UL (ref 0–0.2)
BASOPHILS NFR BLD: 0.6 % (ref 0–1.9)
BASOPHILS NFR BLD: 0.6 % (ref 0–1.9)
BILIRUB SERPL-MCNC: 0.6 MG/DL (ref 0.1–1)
BUN SERPL-MCNC: 13 MG/DL (ref 8–23)
CALCIUM SERPL-MCNC: 9.6 MG/DL (ref 8.7–10.5)
CHLORIDE SERPL-SCNC: 97 MMOL/L (ref 95–110)
CO2 SERPL-SCNC: 31 MMOL/L (ref 23–29)
CREAT SERPL-MCNC: 0.9 MG/DL (ref 0.5–1.4)
CRP SERPL-MCNC: 3.2 MG/L (ref 0–8.2)
DIFFERENTIAL METHOD: ABNORMAL
DIFFERENTIAL METHOD: ABNORMAL
EOSINOPHIL # BLD AUTO: 0.3 K/UL (ref 0–0.5)
EOSINOPHIL # BLD AUTO: 0.3 K/UL (ref 0–0.5)
EOSINOPHIL NFR BLD: 3.1 % (ref 0–8)
EOSINOPHIL NFR BLD: 3.1 % (ref 0–8)
ERYTHROCYTE [DISTWIDTH] IN BLOOD BY AUTOMATED COUNT: 12.7 % (ref 11.5–14.5)
ERYTHROCYTE [DISTWIDTH] IN BLOOD BY AUTOMATED COUNT: 12.7 % (ref 11.5–14.5)
ERYTHROCYTE [SEDIMENTATION RATE] IN BLOOD BY WESTERGREN METHOD: 6 MM/HR (ref 0–36)
EST. GFR  (AFRICAN AMERICAN): >60 ML/MIN/1.73 M^2
EST. GFR  (NON AFRICAN AMERICAN): >60 ML/MIN/1.73 M^2
GLUCOSE SERPL-MCNC: 108 MG/DL (ref 70–110)
HCT VFR BLD AUTO: 45.4 % (ref 37–48.5)
HCT VFR BLD AUTO: 45.4 % (ref 37–48.5)
HGB BLD-MCNC: 14.7 G/DL (ref 12–16)
HGB BLD-MCNC: 14.7 G/DL (ref 12–16)
IMM GRANULOCYTES # BLD AUTO: 0.03 K/UL (ref 0–0.04)
IMM GRANULOCYTES # BLD AUTO: 0.03 K/UL (ref 0–0.04)
IMM GRANULOCYTES NFR BLD AUTO: 0.3 % (ref 0–0.5)
IMM GRANULOCYTES NFR BLD AUTO: 0.3 % (ref 0–0.5)
LYMPHOCYTES # BLD AUTO: 2.1 K/UL (ref 1–4.8)
LYMPHOCYTES # BLD AUTO: 2.1 K/UL (ref 1–4.8)
LYMPHOCYTES NFR BLD: 23.4 % (ref 18–48)
LYMPHOCYTES NFR BLD: 23.4 % (ref 18–48)
MCH RBC QN AUTO: 32.4 PG (ref 27–31)
MCH RBC QN AUTO: 32.4 PG (ref 27–31)
MCHC RBC AUTO-ENTMCNC: 32.4 G/DL (ref 32–36)
MCHC RBC AUTO-ENTMCNC: 32.4 G/DL (ref 32–36)
MCV RBC AUTO: 100 FL (ref 82–98)
MCV RBC AUTO: 100 FL (ref 82–98)
MONOCYTES # BLD AUTO: 0.9 K/UL (ref 0.3–1)
MONOCYTES # BLD AUTO: 0.9 K/UL (ref 0.3–1)
MONOCYTES NFR BLD: 9.9 % (ref 4–15)
MONOCYTES NFR BLD: 9.9 % (ref 4–15)
NEUTROPHILS # BLD AUTO: 5.5 K/UL (ref 1.8–7.7)
NEUTROPHILS # BLD AUTO: 5.5 K/UL (ref 1.8–7.7)
NEUTROPHILS NFR BLD: 62.7 % (ref 38–73)
NEUTROPHILS NFR BLD: 62.7 % (ref 38–73)
NRBC BLD-RTO: 0 /100 WBC
NRBC BLD-RTO: 0 /100 WBC
PLATELET # BLD AUTO: 228 K/UL (ref 150–350)
PLATELET # BLD AUTO: 228 K/UL (ref 150–350)
PMV BLD AUTO: 10.9 FL (ref 9.2–12.9)
PMV BLD AUTO: 10.9 FL (ref 9.2–12.9)
POTASSIUM SERPL-SCNC: 3.7 MMOL/L (ref 3.5–5.1)
PROT SERPL-MCNC: 7.1 G/DL (ref 6–8.4)
RBC # BLD AUTO: 4.54 M/UL (ref 4–5.4)
RBC # BLD AUTO: 4.54 M/UL (ref 4–5.4)
SODIUM SERPL-SCNC: 137 MMOL/L (ref 136–145)
WBC # BLD AUTO: 8.83 K/UL (ref 3.9–12.7)
WBC # BLD AUTO: 8.83 K/UL (ref 3.9–12.7)

## 2019-08-14 PROCEDURE — 36415 COLL VENOUS BLD VENIPUNCTURE: CPT | Mod: PO

## 2019-08-14 PROCEDURE — 86200 CCP ANTIBODY: CPT

## 2019-08-14 PROCEDURE — 86140 C-REACTIVE PROTEIN: CPT

## 2019-08-14 PROCEDURE — 99999 PR PBB SHADOW E&M-EST. PATIENT-LVL III: CPT | Mod: PBBFAC,,, | Performed by: NURSE PRACTITIONER

## 2019-08-14 PROCEDURE — 99213 OFFICE O/P EST LOW 20 MIN: CPT | Mod: S$GLB,,, | Performed by: NURSE PRACTITIONER

## 2019-08-14 PROCEDURE — 3008F PR BODY MASS INDEX (BMI) DOCUMENTED: ICD-10-PCS | Mod: S$GLB,,, | Performed by: NURSE PRACTITIONER

## 2019-08-14 PROCEDURE — 99213 PR OFFICE/OUTPT VISIT, EST, LEVL III, 20-29 MIN: ICD-10-PCS | Mod: S$GLB,,, | Performed by: NURSE PRACTITIONER

## 2019-08-14 PROCEDURE — 3008F BODY MASS INDEX DOCD: CPT | Mod: S$GLB,,, | Performed by: NURSE PRACTITIONER

## 2019-08-14 PROCEDURE — 86431 RHEUMATOID FACTOR QUANT: CPT

## 2019-08-14 PROCEDURE — 99999 PR PBB SHADOW E&M-EST. PATIENT-LVL III: ICD-10-PCS | Mod: PBBFAC,,, | Performed by: NURSE PRACTITIONER

## 2019-08-14 PROCEDURE — 85025 COMPLETE CBC W/AUTO DIFF WBC: CPT

## 2019-08-14 PROCEDURE — 86038 ANTINUCLEAR ANTIBODIES: CPT

## 2019-08-14 PROCEDURE — 80053 COMPREHEN METABOLIC PANEL: CPT

## 2019-08-14 PROCEDURE — 85652 RBC SED RATE AUTOMATED: CPT

## 2019-08-14 RX ORDER — AZITHROMYCIN 500 MG/1
500 TABLET, FILM COATED ORAL DAILY
Qty: 3 TABLET | Refills: 2 | Status: SHIPPED | OUTPATIENT
Start: 2019-08-14 | End: 2019-08-28 | Stop reason: ALTCHOICE

## 2019-08-14 RX ORDER — PREDNISONE 20 MG/1
20 TABLET ORAL DAILY
Qty: 25 TABLET | Refills: 2 | Status: SHIPPED | OUTPATIENT
Start: 2019-08-14 | End: 2020-11-11 | Stop reason: SDUPTHER

## 2019-08-14 NOTE — PROGRESS NOTES
8/14/2019    Abbey Hagan  Office Note    Chief Complaint   Patient presents with    Follow-up     6 months    Sputum Production     yellow, sometimes black       HPI:   August 14, 2019- Breathing unchanged, prednisone/antibiotic 1x in 6 months, currently on Breo daily, Albuterol 1-3x daily. Memory loss is worsening. Cough- occasionally, difficult to get mucous up white in color, Currently smoking 1/2 pack daily.       02/14/2019- went to Urgent care for neck pain tx with ultram and flexeril, Breathing unchanged, smoking unchanged,  Currently using Breo, albuterol inhaler 1-3x daily. Memory loss not as bad.  Cough unchanged. Had sinus issues few weeks prior took azithromycin, resolved. No recent prednisone use.    11/14/2018- still currently smoking 10/day,  Cough daily, productive clear, worse at night. Took prednisone once in last 3 months. . Using trelegy daily, Albuterol inhaler use 1-3 x daily as preventative state SOB once weeking,  States some short term memory loss, can not remember previous days activities. States large amounts of stress due to caring for dependent mother. Several night time awakenings to care for mother who has a bell used to summon her.    Previous HPI Dr. Cruz: 8/14/18- pt with onset lung dx age 41's.  Has cough and wheezes - noturnally worse.  No seasonal variation.  Still smokes.  Worked as  17 yrs.  No sinus problems. [place]    Exacerbates 2x/y, took prednisone started 4 days ago.  No sinus.           The chief compliant  problem varies with instablilty at time    PFSH:  Past Medical History:   Diagnosis Date    Anxiety     Arthritis     back, neck and knees    Depression     Emphysema of lung     HSV infection     Hypertension          Past Surgical History:   Procedure Laterality Date    ARTHROPLASTY-HIP Right 3/14/2017    [facility]    COLONOSCOPY N/A 5/20/2013    [facility]O     HIP ARTHROPLASTY      bilat    INJECTION-STEROID-EPIDURAL-TRANSFORAMINAL Left 12/9/2014    [facility]    JOINT REPLACEMENT      bilat hip     Social History     Tobacco Use    Smoking status: Current Every Day Smoker     Packs/day: 0.50     Years: 40.00     Pack years: 20.00     Types: Cigarettes    Smokeless tobacco: Never Used    Tobacco comment: Patient down to 8 cigs a day   Substance Use Topics    Alcohol use: Yes     Frequency: 2-3 times a week     Drinks per session: 1 or 2     Binge frequency: Never     Comment: socially    Drug use: Yes     Types: Oxycodone     Family History   Problem Relation Age of Onset    COPD Mother     Hypertension Mother     Heart disease Mother     Alcohol abuse Father     Depression Sister     Hypertension Sister     Colon cancer Maternal Grandmother     Breast cancer Paternal Aunt     Ovarian cancer Neg Hx     Psoriasis Neg Hx     Lupus Neg Hx     Eczema Neg Hx     Melanoma Neg Hx      Review of patient's allergies indicates:   Allergen Reactions    Opioids-meperidine and related      Urine toxicology positive for THC    Penicillin g      Childhood allergy--unknown reaction    Sulfa (sulfonamide antibiotics)      Childhood allergy--unknown reaction     I have reviewed past medical, family, and social history. I have reviewed previous nurse notes.    Performance Status:The patient's activity level is functions out of house.          Review of Systems   Constitutional: Negative for activity change, appetite change, chills, diaphoresis, fatigue, fever and unexpected weight change.   HENT: Negative for dental problem, postnasal drip, rhinorrhea, sinus pressure, sinus pain, sneezing, sore throat, trouble swallowing and voice change.    Respiratory: Negative for apnea, and stridor.  Negative for cough, chest tightness, SOB, and wheeze.  Cardiovascular: Negative for chest pain, palpitations and leg swelling.   Gastrointestinal: Negative  "for abdominal distention, abdominal pain, constipation and nausea.   Musculoskeletal: Negative for gait problem, myalgias and neck pain. Positive for swelling in right finger joint, right elbow, bilateral wrist, nodules on joints pt first noticed past year PIP.  Skin: Negative for color change and pallor.   Allergic/Immunologic: Negative for environmental allergies and food allergies.   Neurological: Negative for dizziness, speech difficulty, weakness, light-headedness, numbness and headaches. Positive for bilateral lower extremity numbness in feet  Hematological: Negative for adenopathy. Does not bruise/bleed easily.   Psychiatric/Behavioral: Negative for dysphoric mood and sleep disturbance. The patient is not nervous/anxious.           Exam:Comprehensive exam done. /87 (BP Location: Left arm, Patient Position: Sitting)   Pulse 66   Ht 5' 9" (1.753 m)   Wt 75 kg (165 lb 5.5 oz)   LMP 12/06/1988   SpO2 95% Comment: on room air  BMI 24.42 kg/m²   Exam included Vitals as listed  Constitutional:  oriented to person, place, and time. appears well-developed. No distress.   Mouth/Throat: Uvula is midline, oropharynx is clear and moist and mucous membranes are normal. No dental caries. No oropharyngeal exudate, posterior oropharyngeal edema, posterior oropharyngeal erythema or tonsillar abscesses.  Mallapatti (M) score 2  Cardiovascular: Normal rate, regular rhythm and normal heart sounds. Exam reveals no gallop and no friction rub.   No murmur heard.  Pulmonary/Chest: Effort normal and breath sounds slight left lower lung field wheeze. No accessory muscle usage or stridor. No apnea and no tachypnea. No respiratory distress, decreased breath sounds, rhonchi, rales, or tenderness.   Lymphadenopathy:      no cervical adenopathy.      no axillary adenopathy.   Neurological:  alert and oriented to person, place, and time. not disoriented.   Skin: Skin is warm and dry. Capillary refill takes less 2 sec. No cyanosis " or erythema. No pallor. Nails show slight clubbing. enlarged PIP joint, right elbow swollen, right wrist edema erythema,   Psychiatric: normal mood and affect. behavior is normal. Judgment and thought content normal.       Radiographs (ct chest and cxr) reviewed: results reviewed by direct vision  XR CHEST PA AND LATERALDate: 08/14/2018  There is linear scarring in the right infra hilum.  The aorta is mildly tortuous.  Degenerative changes are seen in the spine.  No confluent infiltrates are identified.  There is mild hyper aeration suggesting COPD.          Labs reviewed       Lab Results   Component Value Date    WBC 13.54 (H) 10/29/2018    RBC 4.60 10/29/2018    HGB 14.8 10/29/2018    HCT 45.3 10/29/2018    MCV 99 (H) 10/29/2018    MCH 32.2 (H) 10/29/2018    MCHC 32.7 10/29/2018    RDW 12.5 10/29/2018     10/29/2018    MPV 11.1 10/29/2018    GRAN 10.6 (H) 10/29/2018    GRAN 78.3 (H) 10/29/2018    LYMPH 1.5 10/29/2018    LYMPH 11.4 (L) 10/29/2018    MONO 1.3 (H) 10/29/2018    MONO 9.4 10/29/2018    EOS 0.1 10/29/2018    BASO 0.04 10/29/2018    EOSINOPHIL 0.4 10/29/2018    BASOPHIL 0.3 10/29/2018       PFT results reviewed  Pulmonary Functions Testing Results:  11/7/18  Spirometry with bronchodilator, lung volume by gas dilution, and diffusion capacity were measured November 7, 2018.  The FEV1 to FVC ratio was 47%.  The FEV1 itself is 38% or 1.1 L.  There was no improvement following bronchodilator.  These values   represent severe airflow obstruction.  Total lung capacity is normal at 90% of predicted.  Residual volume is elevated to 121% of predicted.  There is air trapping.  Diffusion time is reduced but done improves when corrected for lung volumes.  Overall   impression is that patient has very severe airflow obstruction, no significant bronchodilator response, air trapping, and diffusion is somewhat intact.       Plan:  Clinical impression is resonably certain and repeated evaluation prn +/- follow up  will be needed as below.    Abbey was seen today for follow-up and sputum production.    Diagnoses and all orders for this visit:    Swelling of right elbow joint  -     RHEUMATOID FACTOR; Future  -     CYCLIC CITRUL PEPTIDE ANTIBODY, IGG; Future  -     Sedimentation rate; Future  -     MERLE; Future  -     C-REACTIVE PROTEIN; Future  -     CBC auto differential; Future  -     Comprehensive metabolic panel; Future    Chronic obstructive pulmonary disease, unspecified COPD type  -     predniSONE (DELTASONE) 20 MG tablet; Take 1 tablet (20 mg total) by mouth once daily. Take one daily for 5 days, repeat for breathing problems.    COPD with asthma  -     azithromycin (ZITHROMAX) 500 MG tablet; Take 1 tablet (500 mg total) by mouth once daily.        Follow up in about 3 months (around 11/14/2019), or if symptoms worsen or fail to improve.    Discussed with patient above for education the following:      Patient Instructions   Blood work ordered due to swollen joints looking for Rheumatoid arthritis  If positive will send referral to Rheumatologist      Continue current medicaiton regiment for COPD/Asthma    Follow up with Primary care doctor for numbness in feet and swollen joints.

## 2019-08-14 NOTE — PATIENT INSTRUCTIONS
Blood work ordered due to swollen joints looking for Rheumatoid arthritis  If positive will send referral to Rheumatologist      Continue current medicaiton regiment for COPD/Asthma    Follow up with Primary care doctor for numbness in feet and swollen joints.

## 2019-08-15 ENCOUNTER — TELEPHONE (OUTPATIENT)
Dept: PULMONOLOGY | Facility: CLINIC | Age: 64
End: 2019-08-15

## 2019-08-15 LAB
ANA SER QL IF: NORMAL
CCP AB SER IA-ACNC: <0.5 U/ML
RHEUMATOID FACT SERPL-ACNC: <10 IU/ML (ref 0–15)

## 2019-08-15 NOTE — TELEPHONE ENCOUNTER
LVM to return call   ----- Message from Mayr Otoole NP sent at 8/15/2019  1:37 PM CDT -----  Blood work was negative for rheumatoid disease.

## 2019-08-17 DIAGNOSIS — F32.A DEPRESSION, UNSPECIFIED DEPRESSION TYPE: ICD-10-CM

## 2019-08-18 RX ORDER — ESCITALOPRAM OXALATE 20 MG/1
TABLET ORAL
Qty: 30 TABLET | Refills: 0 | Status: SHIPPED | OUTPATIENT
Start: 2019-08-18 | End: 2019-08-28 | Stop reason: SDUPTHER

## 2019-08-27 PROBLEM — R73.03 PREDIABETES: Status: ACTIVE | Noted: 2019-08-27

## 2019-08-27 PROBLEM — M15.9 PRIMARY OSTEOARTHRITIS INVOLVING MULTIPLE JOINTS: Status: ACTIVE | Noted: 2019-08-27

## 2019-08-27 PROBLEM — F41.9 ANXIETY AND DEPRESSION: Status: ACTIVE | Noted: 2019-08-27

## 2019-08-27 PROBLEM — Z86.0100 HISTORY OF COLON POLYPS: Status: ACTIVE | Noted: 2019-08-27

## 2019-08-27 PROBLEM — M15.0 PRIMARY OSTEOARTHRITIS INVOLVING MULTIPLE JOINTS: Status: ACTIVE | Noted: 2019-08-27

## 2019-08-27 PROBLEM — F32.A ANXIETY AND DEPRESSION: Status: ACTIVE | Noted: 2019-08-27

## 2019-08-27 PROBLEM — Z72.0 TOBACCO ABUSE: Status: ACTIVE | Noted: 2019-08-27

## 2019-08-27 PROBLEM — Z86.010 HISTORY OF COLON POLYPS: Status: ACTIVE | Noted: 2019-08-27

## 2019-08-27 PROBLEM — M16.9 HIP ARTHROSIS: Status: RESOLVED | Noted: 2017-03-14 | Resolved: 2019-08-27

## 2019-08-28 ENCOUNTER — OFFICE VISIT (OUTPATIENT)
Dept: FAMILY MEDICINE | Facility: CLINIC | Age: 64
End: 2019-08-28
Payer: COMMERCIAL

## 2019-08-28 ENCOUNTER — LAB VISIT (OUTPATIENT)
Dept: LAB | Facility: HOSPITAL | Age: 64
End: 2019-08-28
Attending: FAMILY MEDICINE
Payer: COMMERCIAL

## 2019-08-28 VITALS
HEIGHT: 69 IN | DIASTOLIC BLOOD PRESSURE: 84 MMHG | BODY MASS INDEX: 24.09 KG/M2 | SYSTOLIC BLOOD PRESSURE: 136 MMHG | WEIGHT: 162.69 LBS | HEART RATE: 78 BPM | TEMPERATURE: 99 F

## 2019-08-28 DIAGNOSIS — M15.9 PRIMARY OSTEOARTHRITIS INVOLVING MULTIPLE JOINTS: ICD-10-CM

## 2019-08-28 DIAGNOSIS — Z13.220 SCREENING FOR LIPID DISORDERS: ICD-10-CM

## 2019-08-28 DIAGNOSIS — H91.93 DECREASED HEARING OF BOTH EARS: ICD-10-CM

## 2019-08-28 DIAGNOSIS — F41.9 ANXIETY AND DEPRESSION: ICD-10-CM

## 2019-08-28 DIAGNOSIS — Z12.31 SCREENING MAMMOGRAM, ENCOUNTER FOR: ICD-10-CM

## 2019-08-28 DIAGNOSIS — Z23 NEED FOR SHINGLES VACCINE: ICD-10-CM

## 2019-08-28 DIAGNOSIS — R73.03 PREDIABETES: ICD-10-CM

## 2019-08-28 DIAGNOSIS — J44.89 COPD WITH ASTHMA: ICD-10-CM

## 2019-08-28 DIAGNOSIS — Z01.419 ENCOUNTER FOR CERVICAL PAP SMEAR WITH PELVIC EXAM: ICD-10-CM

## 2019-08-28 DIAGNOSIS — I10 ESSENTIAL HYPERTENSION: ICD-10-CM

## 2019-08-28 DIAGNOSIS — Z00.00 WELLNESS EXAMINATION: ICD-10-CM

## 2019-08-28 DIAGNOSIS — Z86.010 HISTORY OF COLON POLYPS: ICD-10-CM

## 2019-08-28 DIAGNOSIS — H93.13 TINNITUS, BILATERAL: ICD-10-CM

## 2019-08-28 DIAGNOSIS — F32.A ANXIETY AND DEPRESSION: ICD-10-CM

## 2019-08-28 DIAGNOSIS — Z00.00 WELLNESS EXAMINATION: Primary | ICD-10-CM

## 2019-08-28 DIAGNOSIS — Z72.0 TOBACCO ABUSE: ICD-10-CM

## 2019-08-28 LAB
CHOLEST SERPL-MCNC: 172 MG/DL (ref 120–199)
CHOLEST/HDLC SERPL: 2.4 {RATIO} (ref 2–5)
ESTIMATED AVG GLUCOSE: 117 MG/DL (ref 68–131)
HBA1C MFR BLD HPLC: 5.7 % (ref 4–5.6)
HDLC SERPL-MCNC: 71 MG/DL (ref 40–75)
HDLC SERPL: 41.3 % (ref 20–50)
LDLC SERPL CALC-MCNC: 76.4 MG/DL (ref 63–159)
NONHDLC SERPL-MCNC: 101 MG/DL
TRIGL SERPL-MCNC: 123 MG/DL (ref 30–150)
TSH SERPL DL<=0.005 MIU/L-ACNC: 1.45 UIU/ML (ref 0.4–4)

## 2019-08-28 PROCEDURE — 99396 PREV VISIT EST AGE 40-64: CPT | Mod: S$GLB,,, | Performed by: FAMILY MEDICINE

## 2019-08-28 PROCEDURE — 99396 PR PREVENTIVE VISIT,EST,40-64: ICD-10-PCS | Mod: S$GLB,,, | Performed by: FAMILY MEDICINE

## 2019-08-28 PROCEDURE — 83036 HEMOGLOBIN GLYCOSYLATED A1C: CPT

## 2019-08-28 PROCEDURE — 36415 COLL VENOUS BLD VENIPUNCTURE: CPT | Mod: PO

## 2019-08-28 PROCEDURE — 80061 LIPID PANEL: CPT

## 2019-08-28 PROCEDURE — 99999 PR PBB SHADOW E&M-EST. PATIENT-LVL V: CPT | Mod: PBBFAC,,, | Performed by: FAMILY MEDICINE

## 2019-08-28 PROCEDURE — 99999 PR PBB SHADOW E&M-EST. PATIENT-LVL V: ICD-10-PCS | Mod: PBBFAC,,, | Performed by: FAMILY MEDICINE

## 2019-08-28 PROCEDURE — 84443 ASSAY THYROID STIM HORMONE: CPT

## 2019-08-28 RX ORDER — ESCITALOPRAM OXALATE 20 MG/1
20 TABLET ORAL DAILY
Qty: 90 TABLET | Refills: 3 | Status: SHIPPED | OUTPATIENT
Start: 2019-08-28 | End: 2020-08-25 | Stop reason: SDUPTHER

## 2019-08-28 RX ORDER — LISINOPRIL AND HYDROCHLOROTHIAZIDE 10; 12.5 MG/1; MG/1
1 TABLET ORAL DAILY
Qty: 90 TABLET | Refills: 3 | Status: SHIPPED | OUTPATIENT
Start: 2019-08-28 | End: 2020-08-25 | Stop reason: SDUPTHER

## 2019-08-28 NOTE — PROGRESS NOTES
Subjective:       Patient ID: Abbey Hagan is a 63 y.o. female.    Chief Complaint: Establish Care    HPI   Patient presents for her yearly physical, to establish care with a new family doctor, for medication refills and to complete labs.  She tells me she has been fine since she was last seen here about 10 months ago.  She is a former patient of Dr. Garcia but has not seen him in about 1.5 years.  She has a history of anxiety depression but tells me she is doing great on Lexapro and notes that she has absolutely no anxiety or depression symptoms on the medication but when she has tried to wean medication in the past symptoms present again.  She does have significant stressors as caregiver of her mother who is invalid and demented and tells me she will continue on the medication until after she passes at least.  She is eating well, sleeping well and getting enjoyment of life although she tells me she does not have much time for herself.  She is going on vacation next week and has a sitter to watch her mother and is very excited about this.  She has a history of decreased hearing bilateral as well as bilateral tinnitus that has been present for about the past 20 years and unchanged but she notes she never has had hearing testing done to see if a hearing aid willing prove her hearing.  She generally has difficulty hearing in noisy environments or if she is not facing people who are talking to her.  The tinnitus is mild and not bothersome to her but she does request a referral to ENT for hearing testing today.  She has a history of COPD and asthma and follows with Dr. Cruz for this.  She tells me her breathing has been pretty good this year although she notes she does get short of breath with ambulating although she notes her osteoarthritis symptoms which are fairly severe limit her long before her breathing does.  She tells me at times this can be so severe she does ambulate with a cane although she does  not have this with her today.  She tells me generally she does have to stop and rest after just a few 100 ft.  She does have a disabled parking permit secondary to this and has a copy of her previous permit and requests a new permit be filled out for her today.  She has had total hip arthroplasty previously and tells me this surgery worked very well for her as she was essentially bed-bound most days prior to this.  She is now functional with all activities of daily living and has no difficulty working or caring for her mother.  She has a history of prediabetes but tells me she has been worried about having this checked for quite some time and has not had a hemoglobin A1c in years.  She denies any signs or symptoms of diabetes.  She has a history of hypertension and tells me she infrequently checks her blood pressures at home and these are generally running in the 110-120/70s.  Her blood pressure was elevated today in questioning her about this she tells me she did not take her morning medications secondary to needing to complete labs.  She has these with her and took her lisinopril-hydrochlorothiazide early in the visit and blood pressures did normalize upon recheck before the end of the visit.  She is requesting a refill of lisinopril today and tells me she has done well on this and all of her current medications without any adverse effects.  She notes she does not have any current neuropathy symptoms but frequently gets sciatica with her low back pain and she notes that the gabapentin helps this somewhat.  She uses heat/ice, deep topical muscle rub, massage, exercises and stretching as well as Tylenol and ibuprofen as needed for this and does not use anything daily for her osteoarthritis as she tells me she does not like to have to take medications unless absolutely necessary.  She has no other concerns or complaints today.    Review of Systems   Constitutional: Negative for activity change, appetite change,  "fatigue and unexpected weight change.   HENT: Positive for hearing loss and tinnitus. Negative for congestion, ear pain, sore throat, trouble swallowing and voice change.    Eyes: Negative for visual disturbance.   Respiratory: Negative for cough, chest tightness, shortness of breath and wheezing.    Cardiovascular: Negative for chest pain, palpitations and leg swelling.   Gastrointestinal: Negative for abdominal pain, blood in stool, constipation, diarrhea, nausea and vomiting.   Endocrine: Negative for polydipsia, polyphagia and polyuria.   Genitourinary: Negative for difficulty urinating, dysuria, flank pain and frequency.   Musculoskeletal: Positive for arthralgias. Negative for back pain, gait problem, joint swelling, myalgias, neck pain and neck stiffness.   Skin: Negative for rash and wound.   Neurological: Negative for dizziness, tremors, syncope, weakness, light-headedness, numbness (None currently.  As in the HPI with sciatica.) and headaches.   Hematological: Negative for adenopathy. Does not bruise/bleed easily.   Psychiatric/Behavioral: Negative for dysphoric mood and sleep disturbance. The patient is not nervous/anxious.          Objective:      Vitals:    08/28/19 0808 08/28/19 0840   BP: (!) 144/96 136/84   Pulse: 78    Temp: 98.8 °F (37.1 °C)    TempSrc: Oral    Weight: 73.8 kg (162 lb 11.2 oz)    Height: 5' 9" (1.753 m)    PainSc: 0-No pain      Physical Exam   Constitutional: She is oriented to person, place, and time. She appears well-developed and well-nourished. No distress.   HENT:   Head: Normocephalic and atraumatic.   Right Ear: External ear normal.   Left Ear: External ear normal.   Nose: Nose normal.   Mouth/Throat: Oropharynx is clear and moist.   Eyes: Pupils are equal, round, and reactive to light. Conjunctivae and EOM are normal. No scleral icterus.   Neck: Trachea normal, normal range of motion and phonation normal. Neck supple. No JVD present. No thyroid mass and no thyromegaly " present.   Cardiovascular: Normal rate, regular rhythm and normal heart sounds. Exam reveals no gallop and no friction rub.   No murmur heard.  Pulmonary/Chest: Effort normal and breath sounds normal. No respiratory distress. She has no wheezes.   Abdominal: Soft. Bowel sounds are normal. She exhibits no distension and no mass. There is no tenderness. There is no guarding.   Musculoskeletal: Normal range of motion. She exhibits no edema or deformity.   Lymphadenopathy:     She has no cervical adenopathy.   Neurological: She is alert and oriented to person, place, and time.   Skin: Skin is warm and dry. She is not diaphoretic.   Psychiatric: She has a normal mood and affect. Her behavior is normal. Judgment and thought content normal.   Nursing note and vitals reviewed.        Assessment:       1. Wellness examination    2. Prediabetes    3. Primary osteoarthritis involving multiple joints    4. Essential hypertension    5. COPD with asthma    6. Anxiety and depression    7. Screening for lipid disorders    8. Tobacco abuse    9. History of colon polyps    10. Screening mammogram, encounter for    11. Encounter for cervical Pap smear with pelvic exam    12. Need for shingles vaccine    13. Tinnitus, bilateral    14. Decreased hearing of both ears          Plan:   Wellness examination  -     TSH; Future; Expected date: 08/28/2019  -     Microalbumin/creatinine urine ratio; Future; Expected date: 08/28/2019  -     Lipid panel; Future; Expected date: 08/28/2019  -     Hemoglobin A1c; Future; Expected date: 08/28/2019  -     Ambulatory referral to Gastroenterology  -     Mammo Digital Screening Bilat; Future; Expected date: 08/28/2019  -     Ambulatory referral to Gynecology    Prediabetes  -     Hemoglobin A1c; Future; Expected date: 08/28/2019    Primary osteoarthritis involving multiple joints    Essential hypertension  -     TSH; Future; Expected date: 08/28/2019  -     Microalbumin/creatinine urine ratio; Future;  Expected date: 08/28/2019  -     lisinopril-hydrochlorothiazide (PRINZIDE,ZESTORETIC) 10-12.5 mg per tablet; Take 1 tablet by mouth once daily.  Dispense: 90 tablet; Refill: 3    COPD with asthma    Anxiety and depression  -     escitalopram oxalate (LEXAPRO) 20 MG tablet; Take 1 tablet (20 mg total) by mouth once daily.  Dispense: 90 tablet; Refill: 3    Screening for lipid disorders  -     Lipid panel; Future; Expected date: 08/28/2019    Tobacco abuse    History of colon polyps  -     Ambulatory referral to Gastroenterology    Screening mammogram, encounter for  -     Mammo Digital Screening Bilat; Future; Expected date: 08/28/2019    Encounter for cervical Pap smear with pelvic exam  -     Ambulatory referral to Gynecology    Need for shingles vaccine  -     Cancel: Zoster Recombinant Vaccine  -     Zoster Recombinant Vaccine; Future; Expected date: 10/28/2019    Tinnitus, bilateral  -     Ambulatory referral to ENT    Decreased hearing of both ears  -     Ambulatory referral to ENT      Follow up in about 6 months (around 2/28/2020).    Abbey appears to be stable and doing well today on her current medications.  I reviewed the risks and benefits of all of these with her and will provide her with a refill of her lisinopril-hydrochlorothiazide and Lexapro unchanged as she has been doing well on these.  She will continue to follow with pulmonology for her asthma and COPD but this appears well controlled and she does have a sick day care plan in place and prednisone and an unknown antibiotic at home that can be started if necessary prior to a follow-up appointment.  I discussed her fears of being diagnosed with diabetes and advised her that she does want to know if she is a diabetic or even if her A1c is increasing so she can make changes and control blood sugars before they are out of control.  She was in agreement with this and will check a hemoglobin A1c.  I discussed other labs as above and she was interested  in all of these.  I reviewed her recent labs within our system in her CBC and CMP were both essentially normal.  It also appears she had a workup for possible rheumatologic cause of her moderate to severe osteoarthritis pains and this was normal as well.  She has been referred to back and spine for injections but she tells me pains have not been bad enough for her to pursue this and she had no interest in referral or any other treatment for joint pains at this time.  I advised her to alert me if they worsen, new symptoms present or she changes her mind about physical therapy, back and spine referral or use of a daily NSAID all of which I discussed with her.  With her bilateral tinnitus and decreased hearing chronic and stable I did agree to provide her with referral to ENT for hearing testing as she requests as she may be interested in hearing aids.  Blood pressures were initially elevated but normalized upon recheck after she took her morning medication.  I advised her she can take morning medications and water with all future labs even if they need to be done fasting and she reports her blood pressures at home are very well controlled.  I advised her only to alert me if blood pressures become uncontrolled prior to routine follow-up and will refill her medication unchanged.  I discussed the chronic health risks of tobacco abuse and possible smoking cessation options and supports.  She advised me that pulmonology has discussed this with her over and over again and she is just not ready to quit smoking at this time although she would not discuss barriers with me.  I advised her to think about what we discussed if she changes her mind at any time I would be happy to discuss treatment options and supports with her again.  I reviewed health maintenance issues with her and she does have a history of colon polyps and is overdue for repeat colonoscopy.  I recommended referral to GI for this and she was in agreement that  she needs to set up this appointment and complete this.  I have placed this referral for her.  She is also overdue for Pap/pelvic exam and mammogram and tells me she has just been too busy to complete these but was interested in referral to gyn and an order for a mammogram that she can complete at her convenience.  I have placed these orders for her.  She has not had shingles vaccination previously and I discussed the Shingrix and influenza vaccine in detail with risks and benefits recommending both of these today.  She was interested in both but unfortunately we are out of both vaccines at this time.  I advised her she can receive both these at the pharmacy or call the office in a few weeks as we should have these in stock then.  She advised me she did not want to get vaccinations at the pharmacy and would call the office and return for vaccination.  I did agree to fill out her disabled parking permit form.   As long she continues to do well and there are no significant abnormalities found on her labs I recommended seeing her back at 6 month interval and she was in agreement with this.  I will see her back sooner if any problems.

## 2019-09-13 RX ORDER — LISINOPRIL AND HYDROCHLOROTHIAZIDE 20; 25 MG/1; MG/1
TABLET ORAL
Qty: 30 TABLET | Refills: 0 | OUTPATIENT
Start: 2019-09-13

## 2019-10-15 DIAGNOSIS — M54.32 BILATERAL SCIATICA: ICD-10-CM

## 2019-10-15 DIAGNOSIS — M54.31 BILATERAL SCIATICA: ICD-10-CM

## 2019-10-15 RX ORDER — GABAPENTIN 100 MG/1
100 CAPSULE ORAL NIGHTLY
Qty: 90 CAPSULE | Refills: 3 | Status: SHIPPED | OUTPATIENT
Start: 2019-10-15 | End: 2019-12-05 | Stop reason: SDUPTHER

## 2019-11-08 ENCOUNTER — PATIENT OUTREACH (OUTPATIENT)
Dept: ADMINISTRATIVE | Facility: HOSPITAL | Age: 64
End: 2019-11-08

## 2019-11-08 NOTE — LETTER
November 21, 2019    Abbey Bentley  [address]  [place] [address]             [facility]  [address]  [facility] [address]  Phone: [phone] Abbey Hagan   [address] [place] [address]     Dear, [facility].  To help you get the most out of each of your visits, we will review your information to make sure you are up to date on all of your recommended tests and/or procedures.  Easton Walker DO  has found that your chart shows you may be due for the following:     Pap Smear with HPV Cotest   Mammogram   Colonoscopy   Follow Up appointment with Dr. Walker, around March 2020     If you have had any of the above done at another facility, please bring the records with you or Fax them to [phone] so that your record at [facility] will be complete. If you have not had any of these tests or procedures done recently and would like to complete this testing ,  please call [phone] or send a message through your [facility] portal to your provider's office.     If you have an upcoming scheduled appointment for the above test and/or procedures, please disregard this letter.     If you are currently taking medication, please bring it with you to your appointment for review.     Thank you for letting us care for you,     Efrain Lindo, Care Coordinator   [facility]   Phone: [phone]   Fax: [phone]

## 2019-11-13 ENCOUNTER — OFFICE VISIT (OUTPATIENT)
Dept: PULMONOLOGY | Facility: CLINIC | Age: 64
End: 2019-11-13
Payer: COMMERCIAL

## 2019-11-13 VITALS
WEIGHT: 169.56 LBS | HEART RATE: 77 BPM | BODY MASS INDEX: 25.11 KG/M2 | HEIGHT: 69 IN | SYSTOLIC BLOOD PRESSURE: 138 MMHG | OXYGEN SATURATION: 97 % | DIASTOLIC BLOOD PRESSURE: 85 MMHG

## 2019-11-13 DIAGNOSIS — J44.89 COPD WITH ASTHMA: Primary | ICD-10-CM

## 2019-11-13 PROCEDURE — 99999 PR PBB SHADOW E&M-EST. PATIENT-LVL III: CPT | Mod: PBBFAC,,, | Performed by: NURSE PRACTITIONER

## 2019-11-13 PROCEDURE — 3008F PR BODY MASS INDEX (BMI) DOCUMENTED: ICD-10-PCS | Mod: S$GLB,,, | Performed by: NURSE PRACTITIONER

## 2019-11-13 PROCEDURE — 99213 PR OFFICE/OUTPT VISIT, EST, LEVL III, 20-29 MIN: ICD-10-PCS | Mod: S$GLB,,, | Performed by: NURSE PRACTITIONER

## 2019-11-13 PROCEDURE — 99213 OFFICE O/P EST LOW 20 MIN: CPT | Mod: S$GLB,,, | Performed by: NURSE PRACTITIONER

## 2019-11-13 PROCEDURE — 3008F BODY MASS INDEX DOCD: CPT | Mod: S$GLB,,, | Performed by: NURSE PRACTITIONER

## 2019-11-13 PROCEDURE — 99999 PR PBB SHADOW E&M-EST. PATIENT-LVL III: ICD-10-PCS | Mod: PBBFAC,,, | Performed by: NURSE PRACTITIONER

## 2019-11-13 NOTE — PATIENT INSTRUCTIONS
Continue current medication regiment    [facility]

## 2019-11-13 NOTE — PROGRESS NOTES
11/13/2019    Abbey Hagan  Office Note    Chief Complaint   Patient presents with    3 month f/u    COPD       HPI:   11/13/19- Rheumatoid factor negative, bilateral wrist and finger joints continue to swell and are painful. SOB- variable, has mostly good days, has SOB occasionally worse with exertion 4-5 x weekly, still currently smoking 1/2 pack daily. Currently on Breo daily and albuterol rescue daily. No recent prednisone use.     August 14, 2019- Breathing unchanged, prednisone/antibiotic 1x in 6 months, currently on Breo daily, Albuterol 1-3x daily. Memory loss is worsening. Cough- occasionally, difficult to get mucous up white in color, Currently smoking 1/2 pack daily.       02/14/2019- went to Urgent care for neck pain tx with ultram and flexeril, Breathing unchanged, smoking unchanged,  Currently using Breo, albuterol inhaler 1-3x daily. Memory loss not as bad.  Cough unchanged. Had sinus issues few weeks prior took azithromycin, resolved. No recent prednisone use.    11/14/2018- still currently smoking 10/day,  Cough daily, productive clear, worse at night. Took prednisone once in last 3 months. . Using trelegy daily, Albuterol inhaler use 1-3 x daily as preventative state SOB once weeking,  States some short term memory loss, can not remember previous days activities. States large amounts of stress due to caring for dependent mother. Several night time awakenings to care for mother who has a bell used to summon her.    Previous HPI Dr. Cruz: 8/14/18- pt with onset lung dx age 41's.  Has cough and wheezes - noturnally worse.  No seasonal variation.  Still smokes.  Worked as  17 yrs.  No sinus problems. [place]    Exacerbates 2x/y, took prednisone started 4 days ago.  No sinus.           The chief compliant  problem varies with instablilty at time    PFSH:  Past Medical History:   Diagnosis Date    Anxiety     Arthritis     back, neck and knees    Depression     Emphysema of  lung     HSV infection     Hypertension          Past Surgical History:   Procedure Laterality Date    HIP ARTHROPLASTY      bilat    JOINT REPLACEMENT      bilat hip     Social History     Tobacco Use    Smoking status: Current Every Day Smoker     Packs/day: 0.50     Years: 40.00     Pack years: 20.00     Types: Cigarettes    Smokeless tobacco: Never Used    Tobacco comment: Patient down to 8 cigs a day   Substance Use Topics    Alcohol use: Yes     Frequency: 2-3 times a week     Drinks per session: 1 or 2     Binge frequency: Never     Comment: socially    Drug use: Yes     Types: Oxycodone     Family History   Problem Relation Age of Onset    COPD Mother     Hypertension Mother     Heart disease Mother     Alcohol abuse Father     Depression Sister     Hypertension Sister     Colon cancer Maternal Grandmother     Breast cancer Paternal Aunt     Ovarian cancer Neg Hx     Psoriasis Neg Hx     Lupus Neg Hx     Eczema Neg Hx     Melanoma Neg Hx      Review of patient's allergies indicates:   Allergen Reactions    Opioids-meperidine and related      Urine toxicology positive for THC    Penicillin g      Childhood allergy--unknown reaction    Sulfa (sulfonamide antibiotics)      Childhood allergy--unknown reaction     I have reviewed past medical, family, and social history. I have reviewed previous nurse notes.    Performance Status:The patient's activity level is functions out of house.          Review of Systems   Constitutional: Negative for activity change, appetite change, chills, diaphoresis, fatigue, fever and unexpected weight change.   HENT: Negative for dental problem, postnasal drip, rhinorrhea, sinus pressure, sinus pain, sneezing, sore throat, trouble swallowing and voice change.    Respiratory: Negative for apnea, and stridor, chest tightness, and wheeze. Positive for cough and SOB w exertion  Cardiovascular: Negative for chest pain, palpitations and leg swelling.   "  Musculoskeletal: Negative for gait problem, myalgias and neck pain. Positive for swelling in right finger joint, right elbow, bilateral wrist, nodules on joints pt first noticed past year PIP.  Skin: Negative for color change and pallor.   Allergic/Immunologic: Negative for environmental allergies and food allergies.   Neurological: Negative for dizziness, speech difficulty, weakness, light-headedness, numbness and headaches. Positive for bilateral lower extremity numbness in feet  Hematological: Negative for adenopathy. Does not bruise/bleed easily.   Psychiatric/Behavioral: Negative for dysphoric mood. The patient is anxious and positive for sleep disturbance.          Exam:Comprehensive exam done. /85 (BP Location: Left arm, Patient Position: Sitting, BP Method: Medium (Automatic))   Pulse 77   Ht 5' 9" (1.753 m)   Wt 76.9 kg (169 lb 8.5 oz)   LMP 12/06/1988   SpO2 97% Comment: on room air at rest  BMI 25.04 kg/m²   Exam included Vitals as listed  Constitutional:  oriented to person, place, and time. appears well-developed. No distress.   Mouth/Throat: Uvula is midline, oropharynx is clear and moist and mucous membranes are normal. No dental caries. No oropharyngeal exudate, posterior oropharyngeal edema, posterior oropharyngeal erythema or tonsillar abscesses.  Mallapatti (M) score 2  Cardiovascular: Normal rate, regular rhythm and normal heart sounds. Exam reveals no gallop and no friction rub.   No murmur heard.  Pulmonary/Chest: Effort normal and breath sounds slight left lower lung field wheeze. No accessory muscle usage or stridor. No apnea and no tachypnea. No respiratory distress, decreased breath sounds, rhonchi, rales, or tenderness.   Lymphadenopathy:  no cervical adenopathy.   Neurological:  alert and oriented to person, place, and time. not disoriented.   Skin: Skin is warm and dry. Capillary refill takes less 2 sec. No cyanosis or erythema. No pallor. Nails show slight clubbing. " enlarged PIP joint, right elbow swollen, right wrist edema erythema,   Psychiatric: normal mood and affect. behavior is normal. Judgment and thought content normal.       Radiographs (ct chest and cxr) reviewed: results reviewed by direct vision  XR CHEST PA AND LATERALDate: 08/14/2018  There is linear scarring in the right infra hilum.  The aorta is mildly tortuous.  Degenerative changes are seen in the spine.  No confluent infiltrates are identified.  There is mild hyper aeration suggesting COPD.      Labs reviewed     8/14/19 CBC normal  Results for SHARLENE CHAMORRO (MRN [id number]) as of 11/13/2019 15:35   Ref. Range 8/14/2019 16:38   CCP Antibodies Latest Ref Range: <5.0 U/mL <0.5   Rheumatoid Factor Latest Ref Range: 0.0 - 15.0 IU/mL <10.0     PFT results reviewed  Pulmonary Functions Testing Results:  11/7/18  Spirometry with bronchodilator, lung volume by gas dilution, and diffusion capacity were measured November 7, 2018.  The FEV1 to FVC ratio was 47%.  The FEV1 itself is 38% or 1.1 L.  There was no improvement following bronchodilator.  These values   represent severe airflow obstruction.  Total lung capacity is normal at 90% of predicted.  Residual volume is elevated to 121% of predicted.  There is air trapping.  Diffusion time is reduced but done improves when corrected for lung volumes.  Overall   impression is that patient has very severe airflow obstruction, no significant bronchodilator response, air trapping, and diffusion is somewhat intact.       Plan:  Clinical impression is resonably certain and repeated evaluation prn +/- follow up will be needed as below.    Sharlene was seen today for 3 month f/u and copd.    Diagnoses and all orders for this visit:    COPD with asthma     - continue current medication regiment    Follow up in about 6 months (around 5/13/2020), or if symptoms worsen or fail to improve.    Discussed with patient above for education the following:      Patient Instructions    Continue current medication regiment    [facility]

## 2019-11-18 ENCOUNTER — PATIENT OUTREACH (OUTPATIENT)
Dept: ADMINISTRATIVE | Facility: HOSPITAL | Age: 64
End: 2019-11-18

## 2019-11-18 NOTE — LETTER
November 26, 2019    Abbey Bentley  [address]  [place] [address]             [facility]  [address]  [facility] [address]  Phone: [phone] Abbey Hagan   [address] [place] [address]     Dear, Abbey Hagan     [facility] is committed to your overall health.  To help you get the most out of each of your visits, we will review your information to make sure you are up to date on all of your recommended tests and/or procedures.  Easton Walker DO  has found that your chart shows you may be due for the following:     Pap Smear with HPV Cotest   Mammogram   Colonoscopy     Please   If you have had any of the above done at another facility, please bring the records with you or Fax them to [phone] so that your record at [facility] will be complete. If you have not had any of these tests or procedures done recently and would like to complete this testing ,  please call [phone] or send a message through your [facility] portal to your provider's office.     If you have an upcoming scheduled appointment for the above test and/or procedures, please disregard this letter.     If you are currently taking medication, please bring it with you to your appointment for review.     Thank you for letting us care for you,     Efrain Lindo, Care Coordinator   [facility]   Phone: [phone]   Fax: [phone]

## 2019-11-26 DIAGNOSIS — J43.9 PULMONARY EMPHYSEMA, UNSPECIFIED EMPHYSEMA TYPE: Chronic | ICD-10-CM

## 2019-11-26 DIAGNOSIS — J44.9 CHRONIC OBSTRUCTIVE PULMONARY DISEASE, UNSPECIFIED COPD TYPE: Chronic | ICD-10-CM

## 2019-11-26 RX ORDER — ALBUTEROL SULFATE 90 UG/1
2 AEROSOL, METERED RESPIRATORY (INHALATION) EVERY 4 HOURS PRN
Qty: 3 INHALER | Refills: 3 | Status: SHIPPED | OUTPATIENT
Start: 2019-11-26 | End: 2020-08-25 | Stop reason: SDUPTHER

## 2019-11-26 NOTE — PROGRESS NOTES
"Attempted to outreach patient for pre-visit via "K[employer]", no answer after a week. Sending outreach via Mail Out Letter now.    "

## 2019-12-04 ENCOUNTER — PATIENT MESSAGE (OUTPATIENT)
Dept: FAMILY MEDICINE | Facility: CLINIC | Age: 64
End: 2019-12-04

## 2019-12-04 ENCOUNTER — PATIENT MESSAGE (OUTPATIENT)
Dept: PULMONOLOGY | Facility: CLINIC | Age: 64
End: 2019-12-04

## 2019-12-05 DIAGNOSIS — M54.32 BILATERAL SCIATICA: ICD-10-CM

## 2019-12-05 DIAGNOSIS — M54.31 BILATERAL SCIATICA: ICD-10-CM

## 2019-12-05 RX ORDER — GABAPENTIN 100 MG/1
200 CAPSULE ORAL NIGHTLY
Qty: 180 CAPSULE | Refills: 3 | Status: SHIPPED | OUTPATIENT
Start: 2019-12-05 | End: 2020-08-25 | Stop reason: SDUPTHER

## 2019-12-11 ENCOUNTER — PATIENT OUTREACH (OUTPATIENT)
Dept: ADMINISTRATIVE | Facility: HOSPITAL | Age: 64
End: 2019-12-11

## 2020-04-02 ENCOUNTER — PATIENT OUTREACH (OUTPATIENT)
Dept: ADMINISTRATIVE | Facility: HOSPITAL | Age: 65
End: 2020-04-02

## 2020-04-02 NOTE — PROGRESS NOTES
Chart review completed 04/02/2020.  Care Everywhere updates requested and reviewed.  Immunizations reconciled. Media reviewed.

## 2020-05-05 ENCOUNTER — PATIENT MESSAGE (OUTPATIENT)
Dept: ADMINISTRATIVE | Facility: HOSPITAL | Age: 65
End: 2020-05-05

## 2020-05-12 ENCOUNTER — PATIENT OUTREACH (OUTPATIENT)
Dept: ADMINISTRATIVE | Facility: OTHER | Age: 65
End: 2020-05-12

## 2020-05-13 ENCOUNTER — OFFICE VISIT (OUTPATIENT)
Dept: PULMONOLOGY | Facility: CLINIC | Age: 65
End: 2020-05-13
Payer: COMMERCIAL

## 2020-05-13 VITALS
SYSTOLIC BLOOD PRESSURE: 143 MMHG | TEMPERATURE: 100 F | HEART RATE: 79 BPM | BODY MASS INDEX: 23.8 KG/M2 | WEIGHT: 161.19 LBS | OXYGEN SATURATION: 93 % | DIASTOLIC BLOOD PRESSURE: 79 MMHG

## 2020-05-13 DIAGNOSIS — F32.A ANXIETY AND DEPRESSION: ICD-10-CM

## 2020-05-13 DIAGNOSIS — F41.9 ANXIETY AND DEPRESSION: ICD-10-CM

## 2020-05-13 DIAGNOSIS — J44.89 COPD WITH ASTHMA: Primary | ICD-10-CM

## 2020-05-13 DIAGNOSIS — F32.A DEPRESSION, UNSPECIFIED DEPRESSION TYPE: ICD-10-CM

## 2020-05-13 PROCEDURE — 99999 PR PBB SHADOW E&M-EST. PATIENT-LVL IV: ICD-10-PCS | Mod: PBBFAC,,, | Performed by: NURSE PRACTITIONER

## 2020-05-13 PROCEDURE — 3008F PR BODY MASS INDEX (BMI) DOCUMENTED: ICD-10-PCS | Mod: S$GLB,,, | Performed by: NURSE PRACTITIONER

## 2020-05-13 PROCEDURE — 99999 PR PBB SHADOW E&M-EST. PATIENT-LVL IV: CPT | Mod: PBBFAC,,, | Performed by: NURSE PRACTITIONER

## 2020-05-13 PROCEDURE — 99214 PR OFFICE/OUTPT VISIT, EST, LEVL IV, 30-39 MIN: ICD-10-PCS | Mod: S$GLB,,, | Performed by: NURSE PRACTITIONER

## 2020-05-13 PROCEDURE — 3008F BODY MASS INDEX DOCD: CPT | Mod: S$GLB,,, | Performed by: NURSE PRACTITIONER

## 2020-05-13 PROCEDURE — 99214 OFFICE O/P EST MOD 30 MIN: CPT | Mod: S$GLB,,, | Performed by: NURSE PRACTITIONER

## 2020-05-13 RX ORDER — AZITHROMYCIN 500 MG/1
TABLET, FILM COATED ORAL
COMMUNITY
Start: 2020-02-13 | End: 2020-05-13 | Stop reason: SDUPTHER

## 2020-05-13 RX ORDER — IPRATROPIUM BROMIDE AND ALBUTEROL SULFATE 2.5; .5 MG/3ML; MG/3ML
3 SOLUTION RESPIRATORY (INHALATION) EVERY 6 HOURS PRN
Qty: 1 BOX | Refills: 11 | Status: SHIPPED | OUTPATIENT
Start: 2020-05-13 | End: 2021-05-13

## 2020-05-13 RX ORDER — AZITHROMYCIN 500 MG/1
500 TABLET, FILM COATED ORAL DAILY
Qty: 3 TABLET | Refills: 1 | Status: SHIPPED | OUTPATIENT
Start: 2020-05-13 | End: 2021-02-19 | Stop reason: SDUPTHER

## 2020-05-13 NOTE — PROGRESS NOTES
5/13/2020    Abbey Hagan  Office Note    Chief Complaint   Patient presents with    Follow-up     6 months    Shortness of Breath       HPI:   5/13/2020- SOB episode- onset 3 weeks, associated with cough productive, can feel mucous moving around in chest but can not clear out. Albuterol rescue 1-3x daily. On breo daily  smoking has decreased to 7 cigarettes daily,   Mother has moved into a facility, states depression has worsened in past few weeks. On lexapro daily with little benefit, wanting to adopt pet cat.     11/13/19- Rheumatoid factor negative, bilateral wrist and finger joints continue to swell and are painful. SOB- variable, has mostly good days, has SOB occasionally worse with exertion 4-5 x weekly, still currently smoking 1/2 pack daily. Currently on Breo daily and albuterol rescue daily. No recent prednisone use.     August 14, 2019- Breathing unchanged, prednisone/antibiotic 1x in 6 months, currently on Breo daily, Albuterol 1-3x daily. Memory loss is worsening. Cough- occasionally, difficult to get mucous up white in color, Currently smoking 1/2 pack daily.       02/14/2019- went to Urgent care for neck pain tx with ultram and flexeril, Breathing unchanged, smoking unchanged,  Currently using Breo, albuterol inhaler 1-3x daily. Memory loss not as bad.  Cough unchanged. Had sinus issues few weeks prior took azithromycin, resolved. No recent prednisone use.    11/14/2018- still currently smoking 10/day,  Cough daily, productive clear, worse at night. Took prednisone once in last 3 months. . Using trelegy daily, Albuterol inhaler use 1-3 x daily as preventative state SOB once weeking,  States some short term memory loss, can not remember previous days activities. States large amounts of stress due to caring for dependent mother. Several night time awakenings to care for mother who has a bell used to summon her.    Previous HPI Dr. Cruz: 8/14/18- pt with onset lung dx age 41's.  Has cough and  wheezes - noturnally worse.  No seasonal variation.  Still smokes.  Worked as  17 yrs.  No sinus problems. [place]    Exacerbates 2x/y, took prednisone started 4 days ago.  No sinus.           The chief compliant  problem varies with instablilty at time    PFSH:  Past Medical History:   Diagnosis Date    Anxiety     Arthritis     back, neck and knees    Depression     Emphysema of lung     HSV infection     Hypertension          Past Surgical History:   Procedure Laterality Date    HIP ARTHROPLASTY      bilat    JOINT REPLACEMENT      bilat hip     Social History     Tobacco Use    Smoking status: Current Every Day Smoker     Packs/day: 0.50     Years: 40.00     Pack years: 20.00     Types: Cigarettes    Smokeless tobacco: Never Used    Tobacco comment: Patient down to 8 cigs a day   Substance Use Topics    Alcohol use: Yes     Frequency: 2-3 times a week     Drinks per session: 1 or 2     Binge frequency: Never     Comment: socially    Drug use: Yes     Types: Oxycodone     Family History   Problem Relation Age of Onset    COPD Mother     Hypertension Mother     Heart disease Mother     Alcohol abuse Father     Depression Sister     Hypertension Sister     Colon cancer Maternal Grandmother     Breast cancer Paternal Aunt     Ovarian cancer Neg Hx     Psoriasis Neg Hx     Lupus Neg Hx     Eczema Neg Hx     Melanoma Neg Hx      Review of patient's allergies indicates:   Allergen Reactions    Opioids-meperidine and related      Urine toxicology positive for THC    Penicillin g      Childhood allergy--unknown reaction    Sulfa (sulfonamide antibiotics)      Childhood allergy--unknown reaction     I have reviewed past medical, family, and social history. I have reviewed previous nurse notes.    Performance Status:The patient's activity level is functions out of house.          Review of Systems   Constitutional: Negative for activity change, appetite change, chills,  diaphoresis, fatigue, fever and unexpected weight change.   HENT: Negative for dental problem, postnasal drip, rhinorrhea, sinus pressure, sinus pain, sneezing, sore throat, trouble swallowing and voice change.    Respiratory: Negative for apnea, and stridor, chest tightness, and wheeze. Positive for cough and SOB w exertion  Cardiovascular: Negative for chest pain, palpitations and leg swelling.    Musculoskeletal: Negative for gait problem, myalgias and neck pain. Positive for swelling in right finger joint, right elbow, bilateral wrist, nodules on joints pt first noticed past year PIP.  Skin: Negative for color change and pallor.   Allergic/Immunologic: Negative for environmental allergies and food allergies.   Neurological: Negative for dizziness, speech difficulty, weakness, light-headedness, numbness and headaches. Positive for bilateral lower extremity numbness in feet  Hematological: Negative for adenopathy. Does not bruise/bleed easily.   Psychiatric/Behavioral: Negative for dysphoric mood. The patient is anxious and positive for sleep disturbance.          Exam:Comprehensive exam done. BP (!) 143/79 (BP Location: Left arm, Patient Position: Sitting)   Pulse 79   Temp 99.7 °F (37.6 °C)   Wt 73.1 kg (161 lb 2.5 oz)   LMP 12/06/1988   SpO2 (!) 93% Comment: on room air at rest  BMI 23.80 kg/m²   Exam included Vitals as listed  Constitutional:  oriented to person, place, and time. appears well-developed. No distress.   Mouth/Throat: Uvula is midline, oropharynx is clear and moist and mucous membranes are normal. No dental caries. No oropharyngeal exudate, posterior oropharyngeal edema, posterior oropharyngeal erythema or tonsillar abscesses.  M[place] (M) score 2  Cardiovascular: Normal rate, regular rhythm and normal heart sounds. Exam reveals no gallop and no friction rub.   No murmur heard.  Pulmonary/Chest: Effort normal and breath sounds bilateral expiratory wheeze. No accessory muscle usage or  stridor. No apnea and no tachypnea. No respiratory distress, decreased breath sounds, rhonchi, rales, or tenderness.   Lymphadenopathy:  no cervical adenopathy.   Neurological:  alert and oriented to person, place, and time. not disoriented.   Skin: Skin is warm and dry. Capillary refill takes less 2 sec. No cyanosis or erythema. No pallor. Nails show slight clubbing. enlarged PIP joint, right elbow swollen, right wrist edema erythema,   Psychiatric: normal mood and affect. behavior is normal. Judgment and thought content normal.       Radiographs (ct chest and cxr) reviewed: results reviewed by direct vision  XR CHEST PA AND LATERALDate: 08/14/2018  There is linear scarring in the right infra hilum.  The aorta is mildly tortuous.  Degenerative changes are seen in the spine.  No confluent infiltrates are identified.  There is mild hyper aeration suggesting COPD.      Labs reviewed     8/14/19 CBC normal  Results for SHARLENE CHAMORRO (MRN [id number]) as of 11/13/2019 15:35   Ref. Range 8/14/2019 16:38   CCP Antibodies Latest Ref Range: <5.0 U/mL <0.5   Rheumatoid Factor Latest Ref Range: 0.0 - 15.0 IU/mL <10.0     PFT results reviewed  Pulmonary Functions Testing Results:  11/7/18  Spirometry with bronchodilator, lung volume by gas dilution, and diffusion capacity were measured November 7, 2018.  The FEV1 to FVC ratio was 47%.  The FEV1 itself is 38% or 1.1 L.  There was no improvement following bronchodilator.  These values   represent severe airflow obstruction.  Total lung capacity is normal at 90% of predicted.  Residual volume is elevated to 121% of predicted.  There is air trapping.  Diffusion time is reduced but done improves when corrected for lung volumes.  Overall   impression is that patient has very severe airflow obstruction, no significant bronchodilator response, air trapping, and diffusion is somewhat intact.       Plan:  Clinical impression is resonably certain and repeated evaluation prn +/- follow up  will be needed as below.    Abbey was seen today for follow-up and shortness of breath.    Diagnoses and all orders for this visit:    COPD with asthma  -     NEBULIZER FOR HOME USE  -     albuterol-ipratropium (DUO-NEB) 2.5 mg-0.5 mg/3 mL nebulizer solution; Take 3 mLs by nebulization every 6 (six) hours as needed for Wheezing or Shortness of Breath. Rescue  -     tiotropium bromide (SPIRIVA RESPIMAT) 2.5 mcg/actuation Mist; Inhale 2 puffs into the lungs once daily. Controller  -     azithromycin (ZITHROMAX) 500 MG tablet; Take 1 tablet (500 mg total) by mouth once daily. for 3 days    Depression, unspecified depression type  -     Ambulatory referral/consult to Psychology; Future    Anxiety and depression    I spent over 30 mins of time with the patient. Reviewed results of the recently ordered labs, tests and studies; made directives with regards to the results. Over half of this time was spent couseling and coordinating care.     I have explained all of the above in detail and the patient understands all of the current recommendation(s). I have answered all of their questions to the best of my ability and to their complete satisfaction.   The patient is to continue with the current management plan.      Follow up in about 6 months (around 11/13/2020), or if symptoms worsen or fail to improve.    Discussed with patient above for education the following:      Patient Instructions   Continue current COPD medication regiment    Add Spiriva 2 puffs once daily to Breo 1 puff daily    Use Duo Neb nebulizer every 6 hours as needed for cough, chest tightness, wheeze, or cough    Referral to Psychologist for help with anxiety and depression.

## 2020-05-13 NOTE — PATIENT INSTRUCTIONS
Continue current COPD medication regiment    Add Spiriva 2 puffs once daily to Breo 1 puff daily    Use Duo Neb nebulizer every 6 hours as needed for cough, chest tightness, wheeze, or cough    Referral to Psychologist for help with anxiety and depression.

## 2020-05-14 ENCOUNTER — PATIENT MESSAGE (OUTPATIENT)
Dept: PULMONOLOGY | Facility: CLINIC | Age: 65
End: 2020-05-14

## 2020-05-21 ENCOUNTER — PATIENT OUTREACH (OUTPATIENT)
Dept: ADMINISTRATIVE | Facility: HOSPITAL | Age: 65
End: 2020-05-21

## 2020-05-21 NOTE — PROGRESS NOTES
Chart review completed 05/21/2020.  Care Everywhere updates requested and reviewed.  Immunizations reconciled. Media reviewed.       Portal message sent to patient for over due HM.  No mammogram in [facility].5/21/20  No Pap Smear in [facility] 5/21/20

## 2020-08-17 ENCOUNTER — HOSPITAL ENCOUNTER (OUTPATIENT)
Facility: HOSPITAL | Age: 65
Discharge: HOME | End: 2020-08-18
Attending: EMERGENCY MEDICINE | Admitting: HOSPITALIST
Payer: COMMERCIAL

## 2020-08-17 DIAGNOSIS — K62.5 RECTAL BLEEDING: Primary | ICD-10-CM

## 2020-08-17 DIAGNOSIS — K52.9 COLITIS: ICD-10-CM

## 2020-08-17 PROBLEM — K92.2 GI BLEED: Status: ACTIVE | Noted: 2020-08-17

## 2020-08-17 LAB
ABO + RH BLD: NORMAL
ALBUMIN SERPL BCP-MCNC: 4.2 G/DL (ref 3.5–5.2)
ALP SERPL-CCNC: 60 U/L (ref 55–135)
ALT SERPL W/O P-5'-P-CCNC: 19 U/L (ref 10–44)
ANION GAP SERPL CALC-SCNC: 13 MMOL/L (ref 8–16)
AST SERPL-CCNC: 21 U/L (ref 10–40)
BASOPHILS # BLD AUTO: 0.03 K/UL (ref 0–0.2)
BASOPHILS NFR BLD: 0.2 % (ref 0–1.9)
BILIRUB DIRECT SERPL-MCNC: 0.7 MG/DL (ref 0.1–0.3)
BILIRUB SERPL-MCNC: 1.6 MG/DL (ref 0.1–1)
BLD GP AB SCN CELLS X3 SERPL QL: NORMAL
BUN SERPL-MCNC: 11 MG/DL (ref 8–23)
CALCIUM SERPL-MCNC: 9.3 MG/DL (ref 8.7–10.5)
CHLORIDE SERPL-SCNC: 99 MMOL/L (ref 95–110)
CO2 SERPL-SCNC: 27 MMOL/L (ref 23–29)
CREAT SERPL-MCNC: 0.8 MG/DL (ref 0.5–1.4)
DIFFERENTIAL METHOD: ABNORMAL
EOSINOPHIL # BLD AUTO: 0 K/UL (ref 0–0.5)
EOSINOPHIL NFR BLD: 0.1 % (ref 0–8)
ERYTHROCYTE [DISTWIDTH] IN BLOOD BY AUTOMATED COUNT: 12.1 % (ref 11.5–14.5)
EST. GFR  (AFRICAN AMERICAN): >60 ML/MIN/1.73 M^2
EST. GFR  (NON AFRICAN AMERICAN): >60 ML/MIN/1.73 M^2
GLUCOSE SERPL-MCNC: 143 MG/DL (ref 70–110)
HCT VFR BLD AUTO: 41.2 % (ref 37–48.5)
HCT VFR BLD AUTO: 46.3 % (ref 37–48.5)
HGB BLD-MCNC: 13.7 G/DL (ref 12–16)
HGB BLD-MCNC: 15.5 G/DL (ref 12–16)
IMM GRANULOCYTES # BLD AUTO: 0.04 K/UL (ref 0–0.04)
IMM GRANULOCYTES NFR BLD AUTO: 0.2 % (ref 0–0.5)
INR PPP: 0.9 (ref 0.8–1.2)
LYMPHOCYTES # BLD AUTO: 1 K/UL (ref 1–4.8)
LYMPHOCYTES NFR BLD: 6.2 % (ref 18–48)
MCH RBC QN AUTO: 33.3 PG (ref 27–31)
MCHC RBC AUTO-ENTMCNC: 33.5 G/DL (ref 32–36)
MCV RBC AUTO: 100 FL (ref 82–98)
MONOCYTES # BLD AUTO: 1.2 K/UL (ref 0.3–1)
MONOCYTES NFR BLD: 7.3 % (ref 4–15)
NEUTROPHILS # BLD AUTO: 14.2 K/UL (ref 1.8–7.7)
NEUTROPHILS NFR BLD: 86 % (ref 38–73)
NRBC BLD-RTO: 0 /100 WBC
PLATELET # BLD AUTO: 200 K/UL (ref 150–350)
PMV BLD AUTO: 9.9 FL (ref 9.2–12.9)
POTASSIUM SERPL-SCNC: 3.4 MMOL/L (ref 3.5–5.1)
PROT SERPL-MCNC: 7.2 G/DL (ref 6–8.4)
PROTHROMBIN TIME: 10.5 SEC (ref 9–12.5)
RBC # BLD AUTO: 4.65 M/UL (ref 4–5.4)
SARS-COV-2 RDRP RESP QL NAA+PROBE: NEGATIVE
SODIUM SERPL-SCNC: 139 MMOL/L (ref 136–145)
WBC # BLD AUTO: 16.55 K/UL (ref 3.9–12.7)

## 2020-08-17 PROCEDURE — 63600175 PHARM REV CODE 636 W HCPCS: Performed by: EMERGENCY MEDICINE

## 2020-08-17 PROCEDURE — 99285 EMERGENCY DEPT VISIT HI MDM: CPT | Mod: 25

## 2020-08-17 PROCEDURE — 63600175 PHARM REV CODE 636 W HCPCS: Performed by: NURSE PRACTITIONER

## 2020-08-17 PROCEDURE — G0378 HOSPITAL OBSERVATION PER HR: HCPCS

## 2020-08-17 PROCEDURE — 85014 HEMATOCRIT: CPT

## 2020-08-17 PROCEDURE — 85610 PROTHROMBIN TIME: CPT

## 2020-08-17 PROCEDURE — S0030 INJECTION, METRONIDAZOLE: HCPCS | Performed by: NURSE PRACTITIONER

## 2020-08-17 PROCEDURE — 99244 PR OFFICE CONSULTATION,LEVEL IV: ICD-10-PCS | Mod: ,,, | Performed by: INTERNAL MEDICINE

## 2020-08-17 PROCEDURE — 25000003 PHARM REV CODE 250: Performed by: EMERGENCY MEDICINE

## 2020-08-17 PROCEDURE — 94640 AIRWAY INHALATION TREATMENT: CPT

## 2020-08-17 PROCEDURE — 25000242 PHARM REV CODE 250 ALT 637 W/ HCPCS: Performed by: NURSE PRACTITIONER

## 2020-08-17 PROCEDURE — 96376 TX/PRO/DX INJ SAME DRUG ADON: CPT

## 2020-08-17 PROCEDURE — 99244 OFF/OP CNSLTJ NEW/EST MOD 40: CPT | Mod: ,,, | Performed by: INTERNAL MEDICINE

## 2020-08-17 PROCEDURE — 25500020 PHARM REV CODE 255

## 2020-08-17 PROCEDURE — 25000003 PHARM REV CODE 250: Performed by: NURSE PRACTITIONER

## 2020-08-17 PROCEDURE — 80076 HEPATIC FUNCTION PANEL: CPT

## 2020-08-17 PROCEDURE — 99900035 HC TECH TIME PER 15 MIN (STAT)

## 2020-08-17 PROCEDURE — 80048 BASIC METABOLIC PNL TOTAL CA: CPT

## 2020-08-17 PROCEDURE — 85025 COMPLETE CBC W/AUTO DIFF WBC: CPT

## 2020-08-17 PROCEDURE — 36415 COLL VENOUS BLD VENIPUNCTURE: CPT

## 2020-08-17 PROCEDURE — 86901 BLOOD TYPING SEROLOGIC RH(D): CPT

## 2020-08-17 PROCEDURE — 85018 HEMOGLOBIN: CPT

## 2020-08-17 PROCEDURE — S0030 INJECTION, METRONIDAZOLE: HCPCS | Performed by: EMERGENCY MEDICINE

## 2020-08-17 PROCEDURE — 96365 THER/PROPH/DIAG IV INF INIT: CPT

## 2020-08-17 PROCEDURE — U0002 COVID-19 LAB TEST NON-CDC: HCPCS

## 2020-08-17 PROCEDURE — 96367 TX/PROPH/DG ADDL SEQ IV INF: CPT

## 2020-08-17 RX ORDER — HYDROCHLOROTHIAZIDE 12.5 MG/1
12.5 TABLET ORAL DAILY
Status: DISCONTINUED | OUTPATIENT
Start: 2020-08-17 | End: 2020-08-18 | Stop reason: HOSPADM

## 2020-08-17 RX ORDER — HYDROCODONE BITARTRATE AND ACETAMINOPHEN 5; 325 MG/1; MG/1
1 TABLET ORAL EVERY 6 HOURS PRN
Status: DISCONTINUED | OUTPATIENT
Start: 2020-08-17 | End: 2020-08-18 | Stop reason: HOSPADM

## 2020-08-17 RX ORDER — POTASSIUM CHLORIDE 1.5 G/1.58G
60 POWDER, FOR SOLUTION ORAL
Status: DISCONTINUED | OUTPATIENT
Start: 2020-08-17 | End: 2020-08-18 | Stop reason: HOSPADM

## 2020-08-17 RX ORDER — PREDNISONE 20 MG/1
20 TABLET ORAL DAILY
Status: DISCONTINUED | OUTPATIENT
Start: 2020-08-17 | End: 2020-08-18 | Stop reason: HOSPADM

## 2020-08-17 RX ORDER — IPRATROPIUM BROMIDE AND ALBUTEROL SULFATE 2.5; .5 MG/3ML; MG/3ML
3 SOLUTION RESPIRATORY (INHALATION) EVERY 6 HOURS PRN
Status: DISCONTINUED | OUTPATIENT
Start: 2020-08-17 | End: 2020-08-18 | Stop reason: HOSPADM

## 2020-08-17 RX ORDER — FLUTICASONE FUROATE AND VILANTEROL 200; 25 UG/1; UG/1
1 POWDER RESPIRATORY (INHALATION) DAILY
Status: DISCONTINUED | OUTPATIENT
Start: 2020-08-17 | End: 2020-08-18 | Stop reason: HOSPADM

## 2020-08-17 RX ORDER — IPRATROPIUM BROMIDE 0.5 MG/2.5ML
0.5 SOLUTION RESPIRATORY (INHALATION) EVERY 6 HOURS
Status: DISCONTINUED | OUTPATIENT
Start: 2020-08-17 | End: 2020-08-18 | Stop reason: HOSPADM

## 2020-08-17 RX ORDER — METRONIDAZOLE 500 MG/100ML
500 INJECTION, SOLUTION INTRAVENOUS
Status: COMPLETED | OUTPATIENT
Start: 2020-08-17 | End: 2020-08-17

## 2020-08-17 RX ORDER — METRONIDAZOLE 500 MG/100ML
500 INJECTION, SOLUTION INTRAVENOUS
Status: DISCONTINUED | OUTPATIENT
Start: 2020-08-17 | End: 2020-08-18 | Stop reason: HOSPADM

## 2020-08-17 RX ORDER — ONDANSETRON 2 MG/ML
4 INJECTION INTRAMUSCULAR; INTRAVENOUS EVERY 6 HOURS PRN
Status: DISCONTINUED | OUTPATIENT
Start: 2020-08-17 | End: 2020-08-18 | Stop reason: HOSPADM

## 2020-08-17 RX ORDER — LANOLIN ALCOHOL/MO/W.PET/CERES
800 CREAM (GRAM) TOPICAL
Status: DISCONTINUED | OUTPATIENT
Start: 2020-08-17 | End: 2020-08-18 | Stop reason: HOSPADM

## 2020-08-17 RX ORDER — ESCITALOPRAM OXALATE 10 MG/1
20 TABLET ORAL DAILY
Status: DISCONTINUED | OUTPATIENT
Start: 2020-08-17 | End: 2020-08-18 | Stop reason: HOSPADM

## 2020-08-17 RX ORDER — CIPROFLOXACIN 2 MG/ML
400 INJECTION, SOLUTION INTRAVENOUS
Status: COMPLETED | OUTPATIENT
Start: 2020-08-17 | End: 2020-08-17

## 2020-08-17 RX ORDER — SODIUM CHLORIDE 0.9 % (FLUSH) 0.9 %
10 SYRINGE (ML) INJECTION
Status: DISCONTINUED | OUTPATIENT
Start: 2020-08-17 | End: 2020-08-18 | Stop reason: HOSPADM

## 2020-08-17 RX ORDER — ALBUTEROL SULFATE 90 UG/1
2 AEROSOL, METERED RESPIRATORY (INHALATION) EVERY 4 HOURS PRN
Status: DISCONTINUED | OUTPATIENT
Start: 2020-08-17 | End: 2020-08-17 | Stop reason: SDUPTHER

## 2020-08-17 RX ORDER — LISINOPRIL AND HYDROCHLOROTHIAZIDE 10; 12.5 MG/1; MG/1
1 TABLET ORAL DAILY
Status: DISCONTINUED | OUTPATIENT
Start: 2020-08-17 | End: 2020-08-17 | Stop reason: SDUPTHER

## 2020-08-17 RX ORDER — ACETAMINOPHEN 325 MG/1
650 TABLET ORAL
Status: COMPLETED | OUTPATIENT
Start: 2020-08-17 | End: 2020-08-17

## 2020-08-17 RX ORDER — CIPROFLOXACIN 2 MG/ML
400 INJECTION, SOLUTION INTRAVENOUS
Status: DISCONTINUED | OUTPATIENT
Start: 2020-08-17 | End: 2020-08-18 | Stop reason: HOSPADM

## 2020-08-17 RX ORDER — MORPHINE SULFATE 10 MG/ML
4 INJECTION INTRAMUSCULAR; INTRAVENOUS; SUBCUTANEOUS EVERY 4 HOURS PRN
Status: DISCONTINUED | OUTPATIENT
Start: 2020-08-17 | End: 2020-08-18 | Stop reason: HOSPADM

## 2020-08-17 RX ORDER — GABAPENTIN 100 MG/1
200 CAPSULE ORAL NIGHTLY
Status: DISCONTINUED | OUTPATIENT
Start: 2020-08-17 | End: 2020-08-18 | Stop reason: HOSPADM

## 2020-08-17 RX ORDER — POTASSIUM CHLORIDE 1.5 G/1.58G
40 POWDER, FOR SOLUTION ORAL
Status: DISCONTINUED | OUTPATIENT
Start: 2020-08-17 | End: 2020-08-18 | Stop reason: HOSPADM

## 2020-08-17 RX ORDER — ACETAMINOPHEN 325 MG/1
650 TABLET ORAL EVERY 6 HOURS PRN
Status: DISCONTINUED | OUTPATIENT
Start: 2020-08-17 | End: 2020-08-18 | Stop reason: HOSPADM

## 2020-08-17 RX ORDER — SODIUM CHLORIDE 9 MG/ML
INJECTION, SOLUTION INTRAVENOUS CONTINUOUS
Status: DISCONTINUED | OUTPATIENT
Start: 2020-08-17 | End: 2020-08-18 | Stop reason: HOSPADM

## 2020-08-17 RX ORDER — LISINOPRIL 10 MG/1
10 TABLET ORAL DAILY
Status: DISCONTINUED | OUTPATIENT
Start: 2020-08-17 | End: 2020-08-18 | Stop reason: HOSPADM

## 2020-08-17 RX ADMIN — METRONIDAZOLE 500 MG: 500 INJECTION, SOLUTION INTRAVENOUS at 08:08

## 2020-08-17 RX ADMIN — GABAPENTIN 200 MG: 100 CAPSULE ORAL at 08:08

## 2020-08-17 RX ADMIN — PREDNISONE 20 MG: 20 TABLET ORAL at 03:08

## 2020-08-17 RX ADMIN — ACETAMINOPHEN 650 MG: 325 TABLET ORAL at 12:08

## 2020-08-17 RX ADMIN — CIPROFLOXACIN 400 MG: 2 INJECTION, SOLUTION INTRAVENOUS at 10:08

## 2020-08-17 RX ADMIN — METRONIDAZOLE 500 MG: 500 INJECTION, SOLUTION INTRAVENOUS at 12:08

## 2020-08-17 RX ADMIN — HYDROCHLOROTHIAZIDE 12.5 MG: 12.5 TABLET ORAL at 03:08

## 2020-08-17 RX ADMIN — LISINOPRIL 10 MG: 10 TABLET ORAL at 03:08

## 2020-08-17 RX ADMIN — IOHEXOL 75 ML: 350 INJECTION, SOLUTION INTRAVENOUS at 10:08

## 2020-08-17 RX ADMIN — CIPROFLOXACIN 400 MG: 2 INJECTION, SOLUTION INTRAVENOUS at 11:08

## 2020-08-17 RX ADMIN — HYDROCODONE BITARTRATE AND ACETAMINOPHEN 1 TABLET: 5; 325 TABLET ORAL at 08:08

## 2020-08-17 RX ADMIN — SODIUM CHLORIDE: 0.9 INJECTION, SOLUTION INTRAVENOUS at 03:08

## 2020-08-17 RX ADMIN — FLUTICASONE FUROATE AND VILANTEROL TRIFENATATE 1 PUFF: 200; 25 POWDER RESPIRATORY (INHALATION) at 04:08

## 2020-08-17 NOTE — ASSESSMENT & PLAN NOTE
Likely secondary to colitis  Appreciate GI consult  Will monitor H&H  Patient reports rectal bleeding is subsiding with last bowel movement

## 2020-08-17 NOTE — ED PROVIDER NOTES
Encounter Date: 8/17/2020    SCRIBE #1 NOTE: IAnabel am scribing for, and in the presence of, Benjie Kim MD.       History     Chief Complaint   Patient presents with    GI Bleeding     Beginning Saturday night, 1-2 BMs per day with bright red blood     Time seen by provider: 8:16 AM on 08/17/2020    Abbey Hagan is a 64 y.o. female with PMHx of HTN, COPD, and emphysema who presents to the ED with an onset of rectal bleeding for 2 days. The patient reports bright red blood mixed with loose stool several times each day. She also c/o crampy abdominal pain, loss of appetite, fatigue, and chills. No known recent sick contact. The patient denies any recent use of antiinflammatories. Last colonoscopy around 10 yrs ago. The patient denies nausea, vomiting, HA, increased SOB, fever, burning urination, blood in her urine, or any other symptoms at this time. No pertinent PSHx. SHx includes current smoker and daily drinker. Known drug allergies include opioids, penicillin, and sulfa.      The history is provided by the patient.     Review of patient's allergies indicates:   Allergen Reactions    Opioids-meperidine and related      Urine toxicology positive for THC    Penicillin g      Childhood allergy--unknown reaction    Sulfa (sulfonamide antibiotics)      Childhood allergy--unknown reaction     Past Medical History:   Diagnosis Date    Anxiety     Arthritis     back, neck and knees    Depression     Emphysema of lung     HSV infection     Hypertension      Past Surgical History:   Procedure Laterality Date    HIP ARTHROPLASTY      bilat    JOINT REPLACEMENT      bilat hip     Family History   Problem Relation Age of Onset    COPD Mother     Hypertension Mother     Heart disease Mother     Alcohol abuse Father     Depression Sister     Hypertension Sister     Colon cancer Maternal Grandmother     Breast cancer Paternal Aunt     Ovarian cancer Neg Hx     Psoriasis Neg Hx      Lupus Neg Hx     Eczema Neg Hx     Melanoma Neg Hx      Social History     Tobacco Use    Smoking status: Current Every Day Smoker     Packs/day: 0.50     Years: 40.00     Pack years: 20.00     Types: Cigarettes    Smokeless tobacco: Never Used    Tobacco comment: Patient down to 8 cigs a day   Substance Use Topics    Alcohol use: Yes     Frequency: 2-3 times a week     Drinks per session: 1 or 2     Binge frequency: Never     Comment: socially    Drug use: Yes     Types: Oxycodone     Review of Systems   Constitutional: Positive for appetite change, chills and fatigue. Negative for fever.   HENT: Negative for sore throat.    Respiratory: Negative for shortness of breath.    Cardiovascular: Negative for chest pain.   Gastrointestinal: Positive for abdominal pain and anal bleeding. Negative for nausea and vomiting.   Genitourinary: Negative for dysuria and hematuria.   Musculoskeletal: Negative for back pain.   Skin: Negative for rash.   Neurological: Negative for weakness and headaches.   Hematological: Does not bruise/bleed easily.       Physical Exam     Initial Vitals [08/17/20 0809]   BP Pulse Resp Temp SpO2   (!) 225/105 89 20 99.9 °F (37.7 °C) 95 %      MAP       --         Physical Exam    Nursing note and vitals reviewed.  Constitutional: She appears well-developed and well-nourished. She is not diaphoretic. No distress.   HENT:   Head: Normocephalic and atraumatic.   Mouth/Throat: Oropharynx is clear and moist.   Eyes: Conjunctivae are normal.   Neck: Neck supple.   Cardiovascular: Normal rate, regular rhythm, normal heart sounds and intact distal pulses. Exam reveals no gallop and no friction rub.    No murmur heard.  Pulmonary/Chest: No tachypnea. She has wheezes (bilateral). She has no rhonchi. She has no rales.   Patient speaks in complete sentences.   Abdominal: Soft. She exhibits no distension and no mass. There is no splenomegaly. There is abdominal tenderness (diffuse, but worse in LLQ).  There is guarding (voluntary, left-sided). There is no rebound and no CVA tenderness.   Genitourinary:    Rectum normal.   Rectum:      Guaiac result positive.   Guaiac positive stool. : Acceptable.   Genitourinary Comments: Presence of scant blood on glove during rectal exam.     Musculoskeletal: Normal range of motion.   Neurological: She is alert and oriented to person, place, and time.   Skin: No rash noted. No erythema.   Psychiatric: Her speech is normal.         ED Course   Procedures  Labs Reviewed   CBC W/ AUTO DIFFERENTIAL - Abnormal; Notable for the following components:       Result Value    WBC 16.55 (*)     Mean Corpuscular Volume 100 (*)     Mean Corpuscular Hemoglobin 33.3 (*)     Gran # (ANC) 14.2 (*)     Mono # 1.2 (*)     Gran% 86.0 (*)     Lymph% 6.2 (*)     All other components within normal limits   BASIC METABOLIC PANEL - Abnormal; Notable for the following components:    Potassium 3.4 (*)     Glucose 143 (*)     All other components within normal limits   HEPATIC FUNCTION PANEL - Abnormal; Notable for the following components:    Total Bilirubin 1.6 (*)     Bilirubin, Direct 0.7 (*)     All other components within normal limits   SARS-COV-2 RNA AMPLIFICATION, QUAL   PROTIME-INR   TYPE & SCREEN          Imaging Results          CT Abdomen Pelvis With Contrast (Final result)  Result time 08/17/20 10:27:38    Final result by Emertia Hooks MD (08/17/20 10:27:38)                 Impression:      Findings consistent with colitis involving left colon.  There is also diverticulosis.  The    pericolonic fat stranding and fluid is greatest around distal descending colon and combination of acute diverticulitis and colitis could be present but this could all represent colitis.    Additional findings as detailed above including multilevel severe stenosis in the lumbar spine, multiple masses consistent with cysts within the liver and right kidney.      Electronically signed by: Emerita  MD Jessee  Date:    08/17/2020  Time:    10:27             Narrative:    EXAMINATION:  CT ABDOMEN PELVIS WITH CONTRAST    CLINICAL HISTORY:  LLQ abdominal pain, diverticulitis suspected;    TECHNIQUE:  Low dose axial images, sagittal and coronal reformations were obtained from the lung bases to the pubic symphysis following the IV administration of 75 mL of Omnipaque 350 without    COMPARISON:  10/01/2012    FINDINGS:  There is calcified granuloma left lung base    There are a few scattered low-density lesions in the liver not fully characterized but suggesting cysts and similar in appearance to prior study and prior contrast chest CT 10/24/2017.  Largest today measures 10 mm.  16 mm apparent cyst seen right lobe of the liver along the diaphragm on the prior exam is not seen today.    Slight focal fatty change suggested along the falciform ligament    No calcified stones the gallbladder or CT findings of acute cholecystitis.  No biliary duct dilatation    Spleen not enlarged    Adrenal glands unremarkable appearance    Pancreas unremarkable appearance    Abdominal aorta no aneurysm    Stomach, bowel, mesentery; normal appearance of the appendix.  Diverticulosis.  Image degradation in the pelvis from metal artifact with bilateral hip replacements.  Circumferential wall thickening of a long segment of colon, distal transverse through splenic flexure and descending colon into the sigmoid colon and with surrounding pericolonic fat stranding more so distal descending colon and trace pericolonic fluid.  There is diverticulosis but findings more suggestive of colitis and diverticulitis.    No free intraperitoneal air.  No abscess    Kidneys, ureters, bladder; symmetrical renal enhancement.  Small subcentimeter low-density masses in the right kidney although too small to reliably characterize suggests and most likely represent cysts.  No hydronephrosis.  Urinary bladder decompressed at the time of the exam    Reproductive  organs; not well visualized with the metal artifact.    Osseous structures; bilateral hip arthroplasties.  Degenerative changes.  These disc space narrowing, disc desiccation, degenerative change, disc osteophyte complexes in the spine.  Spondylolisthesis L4-5 with L4 anteriorly positioned relative to L5 by 8 mm.  Spinal canal narrowing appears severe L3-L4 and L4-L5.                            (rad read)    The patient was informed of the incidental finding(s) as well as the need for PCP or specialist follow-up for reevaluation and possible further investigation or monitoring.     Medical Decision Making:   History:   Old Medical Records: I decided to obtain old medical records.  Clinical Tests:   Lab Tests: Ordered and Reviewed  Radiological Study: Ordered and Reviewed            Scribe Attestation:   Scribe #1: I performed the above scribed service and the documentation accurately describes the services I performed. I attest to the accuracy of the note.    I, Dr. Benjie Kim, personally performed the services described in this documentation. All medical record entries made by the scribe were at my direction and in my presence.  I have reviewed the chart and agree that the record reflects my personal performance and is accurate and complete. Benjie Kim MD.  3:07 PM 08/17/2020    Abbey Hagan is a 64 y.o. female presenting with rectal bleeding with colitis.  Etiology is unclear.  Antibiotics initiated in case of bacterial infectious etiology although uncertain.  I have discussed with medicine who will discuss with GI as well.  No sign of emergent, life-threatening intra-abdominal process requiring surgical intervention such as perforation, obstruction, abscess.  Low suspicion for diverticulitis given extent inflammatory change.  I doubt sepsis.  Patient appears euvolemic and I doubt life-threatening bleeding or need for emergent transfusion at this point.                      Clinical  Impression:       ICD-10-CM ICD-9-CM   1. Rectal bleeding  K62.5 569.3   2. Colitis  K52.9 558.9         Disposition:   Disposition: Admitted     ED Disposition Condition    Observation                           Benjie Kim MD  08/17/20 [id number]

## 2020-08-17 NOTE — ASSESSMENT & PLAN NOTE
CT abdomen reviewed  IV Cipro and Flagyl  Gastroenterology consult appreciated  Normal saline at 100 cc/hour  Clear liquid diet

## 2020-08-17 NOTE — RESPIRATORY THERAPY
Patient receiving Breo mdi x 1 puff now and qday.  Hr 73 and 02 saturation 94% on room air.  Duoneb q6hr.

## 2020-08-17 NOTE — HPI
"Abbey Hagan  is a 64 year old  female with a PMHx of HTN, anxiety, depression, arthritis, and emphysema presenting with abdominal pain and hematochezia onset 8/15. Pt reports 4 episodes of diarrhea with bight red stools. She describes the abdominal pain as a cramping sensation that is worse in the LLQ and rates it 10/10.  She denies Denies any alleviating factors and states the pain is exacerbated by movement. She also endorses lower back pain, SOB- chronic and unchanged, weakness, fatigue, decreased appetite, and hemorrhoids. She denies fever, unexpected weight loss, CP, n/v, melena, light headedness, LOC, vision changes, congestion, dysuria, and hematuria. Reports smoking 0.3 ppd and is thinking about quitting and has been cutting back every day.. Reports daily ETOH- hard liquor (wild turkey whiskey) "a few drinks a day" but denies any ETOH since Saturday when the pain began.  Patient denies any acute alcohol withdrawal symptoms.  Patient reports smoking marijuana recreationally.      "

## 2020-08-17 NOTE — SUBJECTIVE & OBJECTIVE
Past Medical History:   Diagnosis Date    Anxiety     Arthritis     back, neck and knees    Depression     Emphysema of lung     HSV infection     Hypertension        Past Surgical History:   Procedure Laterality Date    HIP ARTHROPLASTY      bilat    JOINT REPLACEMENT      bilat hip       Review of patient's allergies indicates:   Allergen Reactions    Opioids-meperidine and related      Urine toxicology positive for THC    Penicillin g      Childhood allergy--unknown reaction    Sulfa (sulfonamide antibiotics)      Childhood allergy--unknown reaction       No current facility-administered medications on file prior to encounter.      Current Outpatient Medications on File Prior to Encounter   Medication Sig    albuterol (VENTOLIN HFA) 90 mcg/actuation inhaler Inhale 2 puffs into the lungs every 4 (four) hours as needed for Wheezing or Shortness of Breath. Rescue    albuterol-ipratropium (DUO-NEB) 2.5 mg-0.5 mg/3 mL nebulizer solution Take 3 mLs by nebulization every 6 (six) hours as needed for Wheezing or Shortness of Breath. Rescue    biotin 1 mg Cap Take by mouth.    CHOLECALCIFEROL, VITAMIN D3, (VITAMIN D-3 ORAL) No Sig Provided    escitalopram oxalate (LEXAPRO) 20 MG tablet Take 1 tablet (20 mg total) by mouth once daily.    fluticasone-vilanterol (BREO ELLIPTA) 200-25 mcg/dose DsDv diskus inhaler Inhale 1 puff into the lungs once daily. Controller    gabapentin (NEURONTIN) 100 MG capsule Take 2 capsules (200 mg total) by mouth every evening.    lisinopril-hydrochlorothiazide (PRINZIDE,ZESTORETIC) 10-12.5 mg per tablet Take 1 tablet by mouth once daily.    multivitamin capsule Take 1 capsule by mouth once daily.    POTASSIUM/MAGNESIUM (MAGNESIUM-POTASSIUM ORAL) Take 1 tablet by mouth once daily. Every day    predniSONE (DELTASONE) 20 MG tablet Take 1 tablet (20 mg total) by mouth once daily. Take one daily for 5 days, repeat for breathing problems.    tiotropium bromide (SPIRIVA RESPIMAT)  2.5 mcg/actuation Mist Inhale 2 puffs into the lungs once daily. Controller    vitamin B complex (B COMPLEX 1 ORAL) Take by mouth.     Family History     Problem Relation (Age of Onset)    Alcohol abuse Father    Breast cancer Paternal Aunt    COPD Mother    Colon cancer Maternal Grandmother    Depression Sister    Heart disease Mother    Hypertension Mother, Sister        Tobacco Use    Smoking status: Current Every Day Smoker     Packs/day: 0.50     Years: 40.00     Pack years: 20.00     Types: Cigarettes    Smokeless tobacco: Never Used    Tobacco comment: Patient down to 8 cigs a day   Substance and Sexual Activity    Alcohol use: Yes     Frequency: 2-3 times a week     Drinks per session: 1 or 2     Binge frequency: Never     Comment: socially    Drug use: Yes     Types: Oxycodone    Sexual activity: Not Currently     Review of Systems   Constitutional: Negative for chills, fatigue and fever.   HENT: Negative for congestion, sore throat and trouble swallowing.    Respiratory: Negative for cough and shortness of breath.    Cardiovascular: Negative for chest pain, palpitations and leg swelling.   Gastrointestinal: Positive for abdominal pain and blood in stool. Negative for diarrhea, nausea and vomiting.   Genitourinary: Negative for decreased urine volume, difficulty urinating, dysuria and frequency.   Musculoskeletal: Negative for arthralgias, back pain and gait problem.   Skin: Negative for color change, pallor, rash and wound.   Neurological: Negative for dizziness, weakness and light-headedness.   Hematological: Negative for adenopathy.   Psychiatric/Behavioral: Negative for agitation, behavioral problems and confusion.   All other systems reviewed and are negative.    Objective:     Vital Signs (Most Recent):  Temp: 99.9 °F (37.7 °C) (08/17/20 0809)  Pulse: 74 (08/17/20 1301)  Resp: 20 (08/17/20 0809)  BP: 136/68 (08/17/20 1301)  SpO2: 95 % (08/17/20 1301) Vital Signs (24h Range):  Temp:  [99.9 °F  (37.7 °C)] 99.9 °F (37.7 °C)  Pulse:  [73-89] 74  Resp:  [20] 20  SpO2:  [94 %-95 %] 95 %  BP: (135-225)/() 136/68     Weight: 73 kg (161 lb)  Body mass index is 23.78 kg/m².    Physical Exam  Vitals signs and nursing note reviewed.   Constitutional:       Appearance: She is well-developed.   HENT:      Head: Normocephalic and atraumatic.      Right Ear: External ear normal.      Left Ear: External ear normal.      Nose: Nose normal.      Mouth/Throat:      Mouth: Mucous membranes are moist.      Pharynx: Oropharynx is clear. No oropharyngeal exudate or posterior oropharyngeal erythema.   Eyes:      General: No scleral icterus.     Conjunctiva/sclera: Conjunctivae normal.      Pupils: Pupils are equal, round, and reactive to light.   Neck:      Musculoskeletal: Normal range of motion and neck supple.      Vascular: No JVD.   Cardiovascular:      Rate and Rhythm: Normal rate and regular rhythm.      Heart sounds: Normal heart sounds. No murmur. No gallop.    Pulmonary:      Effort: Pulmonary effort is normal. No respiratory distress.      Breath sounds: No stridor. Wheezing present. No rhonchi or rales.      Comments: Scattered expiratory wheezing  Abdominal:      General: Bowel sounds are normal. There is no distension.      Palpations: Abdomen is soft.      Tenderness: There is abdominal tenderness.      Comments: Tender to left lower quadrant   Musculoskeletal: Normal range of motion.         General: No swelling or tenderness.   Skin:     General: Skin is warm and dry.      Capillary Refill: Capillary refill takes 2 to 3 seconds.      Coloration: Skin is not jaundiced or pale.      Findings: No erythema or rash.   Neurological:      General: No focal deficit present.      Mental Status: She is alert and oriented to person, place, and time.      Cranial Nerves: No cranial nerve deficit.      Sensory: No sensory deficit.      Motor: No weakness.      Coordination: Coordination normal.      Gait: Gait normal.    Psychiatric:         Mood and Affect: Mood normal.         Behavior: Behavior normal.         Thought Content: Thought content normal.           CRANIAL NERVES     CN III, IV, VI   Pupils are equal, round, and reactive to light.       Significant Labs:   CBC:   Recent Labs   Lab 08/17/20  0844   WBC 16.55*   HGB 15.5   HCT 46.3        CMP:   Recent Labs   Lab 08/17/20  0844      K 3.4*   CL 99   CO2 27   *   BUN 11   CREATININE 0.8   CALCIUM 9.3   PROT 7.2   ALBUMIN 4.2   BILITOT 1.6*   ALKPHOS 60   AST 21   ALT 19   ANIONGAP 13   EGFRNONAA >60     All pertinent labs within the past 24 hours have been reviewed.    Significant Imaging: I have reviewed all pertinent imaging results/findings within the past 24 hours.

## 2020-08-17 NOTE — CONSULTS
[facility]     CC: Blood in stool    HPI 64 y.o. female with blood in stool, onset 2 days ago, red in color, associated with LLQ pain, copious amount, with no alleviating/exacerbating factors.  She denies prior history of the same.  She states that her baseline bowel habit consists of mild to moderate constipation with some straining.  She has had looser stool since above symptoms began.  She denies fever or sick contacts.  No recent antibiotics.  Her last colonoscopy was with Dr. Tyler in 2013 and diverticulosis was noted.  Case was discussed with Dr. Kim in the ED who states that she had colitis noted on CT.  CT was independently reviewed and showed sigmoid thickening and stranding.  Patient denies any upper GI complaint and she states that on her most recent trip to the bathroom that bleeding seems to have ceased.      Past Medical History:   Diagnosis Date    Anxiety     Arthritis     back, neck and knees    Depression     Emphysema of lung     HSV infection     Hypertension        Past Surgical History:   Procedure Laterality Date    HIP ARTHROPLASTY      bilat    JOINT REPLACEMENT      bilat hip       Social History     Tobacco Use    Smoking status: Current Every Day Smoker     Packs/day: 0.50     Years: 40.00     Pack years: 20.00     Types: Cigarettes    Smokeless tobacco: Never Used    Tobacco comment: Patient down to 8 cigs a day   Substance Use Topics    Alcohol use: Yes     Frequency: 2-3 times a week     Drinks per session: 1 or 2     Binge frequency: Never     Comment: socially    Drug use: Yes     Types: Oxycodone       Family History   Problem Relation Age of Onset    COPD Mother     Hypertension Mother     Heart disease Mother     Alcohol abuse Father     Depression Sister     Hypertension Sister     Colon cancer Maternal Grandmother     Breast cancer Paternal Aunt     Ovarian cancer Neg Hx     Psoriasis Neg Hx     Lupus Neg Hx     Eczema Neg Hx      Melanoma Neg Hx        Review of Systems  General ROS: negative for - chills, fever or weight loss  Psychological ROS: negative for - hallucination, depression or suicidal ideation  Ophthalmic ROS: negative for - blurry vision, photophobia or eye pain  ENT ROS: negative for - epistaxis, sore throat or rhinorrhea  Respiratory ROS: no cough, shortness of breath, or wheezing  Cardiovascular ROS: no chest pain or dyspnea on exertion  Gastrointestinal ROS: + blood in stool and LLQ pain  Genito-Urinary ROS: no dysuria, trouble voiding, or hematuria  Musculoskeletal ROS: negative for - arthralgia, myalgia, weakness  Neurological ROS: no syncope or seizures; no ataxia  Dermatological ROS: negative for pruritis, rash and jaundice    Physical Examination  /68   Pulse 74   Temp 99.9 °F (37.7 °C) (Oral)   Resp 20   Wt 73 kg (161 lb)   LMP 12/06/1988   SpO2 95%   BMI 23.78 kg/m²   General appearance: alert, cooperative, no distress  HENT: Normocephalic, atraumatic, neck symmetrical, no nasal discharge   Eyes: conjunctivae/corneas clear, PERRL, EOM's intact, sclera anicteric  Lungs: clear to auscultation bilaterally, no dullness to percussion bilaterally, symmetric expansion, breathing unlabored  Heart: regular rate and rhythm without rub; no displacement of the PMI   Abdomen: + TTP in LLQ  Extremities: extremities symmetric; no clubbing, cyanosis, or edema  Integument: Skin color, texture, turgor normal; no rashes; hair distrubution normal, no jaundice  Neurologic: Alert and oriented X 3, no focal sensory or motor neurologic deficits  Psychiatric: no pressured speech; normal affect; no evidence of impaired cognition, no anxiety/depression     Labs:  Lab Results   Component Value Date    WBC 16.55 (H) 08/17/2020    HGB 15.5 08/17/2020    HCT 46.3 08/17/2020     (H) 08/17/2020     08/17/2020         CMP  Sodium   Date Value Ref Range Status   08/17/2020 139 136 - 145 mmol/L Final     Potassium   Date  Value Ref Range Status   08/17/2020 3.4 (L) 3.5 - 5.1 mmol/L Final     Chloride   Date Value Ref Range Status   08/17/2020 99 95 - 110 mmol/L Final     CO2   Date Value Ref Range Status   08/17/2020 27 23 - 29 mmol/L Final     Glucose   Date Value Ref Range Status   08/17/2020 143 (H) 70 - 110 mg/dL Final     BUN, Bld   Date Value Ref Range Status   08/17/2020 11 8 - 23 mg/dL Final     Creatinine   Date Value Ref Range Status   08/17/2020 0.8 0.5 - 1.4 mg/dL Final   10/01/2012 0.8 0.2 - 1.4 mg/dl Final     Calcium   Date Value Ref Range Status   08/17/2020 9.3 8.7 - 10.5 mg/dL Final   10/01/2012 9.8 8.6 - 10.2 mg/dl Final     Total Protein   Date Value Ref Range Status   08/17/2020 7.2 6.0 - 8.4 g/dL Final     Albumin   Date Value Ref Range Status   08/17/2020 4.2 3.5 - 5.2 g/dL Final     Total Bilirubin   Date Value Ref Range Status   08/17/2020 1.6 (H) 0.1 - 1.0 mg/dL Final     Comment:     For infants and newborns, interpretation of results should be based  on gestational age, weight and in agreement with clinical  observations.  Premature Infant recommended reference ranges:  Up to 24 hours.............<8.0 mg/dL  Up to 48 hours............<12.0 mg/dL  3-5 days..................<15.0 mg/dL  6-29 days.................<15.0 mg/dL       Alkaline Phosphatase   Date Value Ref Range Status   08/17/2020 60 55 - 135 U/L Final   10/01/2012 64 23 - 119 UNIT/L Final     AST   Date Value Ref Range Status   08/17/2020 21 10 - 40 U/L Final   10/01/2012 12 10 - 30 UNIT/L Final     ALT   Date Value Ref Range Status   08/17/2020 19 10 - 44 U/L Final     Anion Gap   Date Value Ref Range Status   08/17/2020 13 8 - 16 mmol/L Final   10/01/2012 9 5 - 15 meq/L Final     eGFR if    Date Value Ref Range Status   08/17/2020 >60 >60 mL/min/1.73 m^2 Final     eGFR if non    Date Value Ref Range Status   08/17/2020 >60 >60 mL/min/1.73 m^2 Final     Comment:     Calculation used to obtain the estimated  glomerular filtration  rate (eGFR) is the CKD-EPI equation.              Imaging:  CT scan was independently visualized and reviewed by me and showed findings as above.    I have personally reviewed these images    Case discussed with ER physician as above.  See HPI.    Assessment:   1.  Abnormal CT scan  2.  Colitis versus diverticulitis/diverticular bleeding  3.  LLQ pain      Plan:  1.  Agree with IVFs  2.  Agree with antibiotics  3.  Trend H/H  4.  Diet as tolerated.  5.  If no worsening or persistence of bleeding, then recommendation would be to treat conservatively as above with plans for outpatient colonoscopy in 4-6 weeks to allow for mucosal healing and minimize the risk of perforation.  If bleeding persists/worsens, then urgent colonoscopy can be considered.  6.  Further recommendations to follow after above.  7.  Communication will be sent to the referring provider, Dr. Angulo regarding my assessment and plan on this patient via EPIC.      Gerard Hernandez MD  [facility]  [address], [address]  [place] [address]  Office: [phone]  Fax: [phone]

## 2020-08-17 NOTE — ED NOTES
Patient is resting in bed without any needs or questions at this time. Lights off for comfort per the request of patient. Call light is within reach.

## 2020-08-17 NOTE — ASSESSMENT & PLAN NOTE
Chronic, controlled.  Latest blood pressure and vitals reviewed-   Temp:  [99.9 °F (37.7 °C)]   Pulse:  [72-89]   Resp:  [20]   BP: (135-225)/()   SpO2:  [94 %-95 %] .   Home meds for hypertension were reviewed and noted below. Hospital anti-hypertensive changes were made as shown below.  Hypertension Medications             lisinopril-hydrochlorothiazide (PRINZIDE,ZESTORETIC) 10-12.5 mg per tablet Take 1 tablet by mouth once daily.        Will utilize p.r.n. blood pressure medication only if patient's blood pressure greater than  180/110 and she develops symptoms such as worsening chest pain or shortness of breath.

## 2020-08-17 NOTE — ED NOTES
Patient identifiers for Abbey Hagan checked and correct.  LOC: Patient is awake, alert, and aware of environment with an appropriate affect. Patient is oriented x 3 and speaking appropriately. Pt presents to ER with c/o GI bleed with bright red blood about 2 episodes a day since Saturday. Pt c/o cramping abd with chronic back pain.   APPEARANCE: Patient resting comfortably and in no acute distress. Patient is clean and well groomed, patient's clothing is properly fastened.  SKIN: The skin is warm and dry. Patient has normal skin turgor and moist mucus membrances. Skin is intact; no bruising or breakdown noted.  MUSCULOSKELETAL: Patient is moving all extremities well, no obvious deformities noted. Pulses intact.   RESPIRATORY: Airway is open and patent. Respirations are spontaneous and non-labored with normal effort and rate. Pt placed on continuous pulse ox. Hx of COPD. Auditory wheezing noted.   CARDIAC: Patient has a normal rate and rhythm. No peripheral edema noted. Capillary refill < 3 seconds.   ABDOMEN: No distention noted. Bowel sounds active in all 4 quadrants. Soft and non-tender upon palpation.  NEUROLOGICAL: PERRL. Facial expression is symmetrical. Hand grasps are equal bilaterally. Normal sensation in all extremities when touched with finger.  Allergies reported:   Review of patient's allergies indicates:   Allergen Reactions    Opioids-meperidine and related      Urine toxicology positive for THC    Penicillin g      Childhood allergy--unknown reaction    Sulfa (sulfonamide antibiotics)      Childhood allergy--unknown reaction     Bed locked, lowest position. Call light within easy reach. Side rails up x2.

## 2020-08-17 NOTE — MEDICAL/APP STUDENT
"  History     Chief Complaint   Patient presents with    GI Bleeding     Beginning Saturday night, 1-2 BMs per day with bright red blood     This is a 65 yo female with a PMHx of HTN, anxiety, depression, arthritis, and emphysema presenting with abdominal pain and hematochezia onset 8/15. Pt reports 4 episodes of diarrhea with bight red stools. She describes the abdominal pain as a cramping sensation that is worse in th LLQ and rates it 10/10. Denies any alleviating factors and states the pain is exacerbated by movement. She also endorses lower back pain, SOB- chronic and unchanged, weakness, fatigue, decreased appetite, and hemorrhoids. She denies fever, unexpected weight loss, CP, n/v, melena, Ham, light headedness, LOC, vision changes, congestion, dysuria, and hematuria. Reports smoking 0.3 ppd and is thinking about quitting. Reports daily ETOH- hard liquor (wild turkey whiskey) "a few drinks a day" but denies any ETOH since Saturday when the pain began. Reports smoking marijuana. Pt states she has not taken most of her normal medication since 8/15 due to pain but has taken Lexapro.       Past Medical History:   Diagnosis Date    Anxiety     Arthritis     back, neck and knees    Depression     Emphysema of lung     HSV infection     Hypertension        Past Surgical History:   Procedure Laterality Date    HIP ARTHROPLASTY      bilat    JOINT REPLACEMENT      bilat hip       Family History   Problem Relation Age of Onset    COPD Mother     Hypertension Mother     Heart disease Mother     Alcohol abuse Father     Depression Sister     Hypertension Sister     Colon cancer Maternal Grandmother     Breast cancer Paternal Aunt     Ovarian cancer Neg Hx     Psoriasis Neg Hx     Lupus Neg Hx     Eczema Neg Hx     Melanoma Neg Hx        Social History     Tobacco Use    Smoking status: Current Every Day Smoker     Packs/day: 0.50     Years: 40.00     Pack years: 20.00     Types: Cigarettes    " Smokeless tobacco: Never Used    Tobacco comment: Patient down to 8 cigs a day   Substance Use Topics    Alcohol use: Yes     Frequency: 2-3 times a week     Drinks per session: 1 or 2     Binge frequency: Never     Comment: socially    Drug use: Yes     Types: Oxycodone       Review of Systems   Constitutional: Positive for appetite change (decrased), chills and fatigue. Negative for fever and unexpected weight change.   HENT: Negative for congestion.    Eyes: Negative for visual disturbance.   Respiratory: Positive for shortness of breath (chronic).    Cardiovascular: Negative for chest pain.   Gastrointestinal: Positive for abdominal pain (cramping LLQ), anal bleeding, blood in stool and diarrhea. Negative for nausea, rectal pain and vomiting.   Endocrine: Negative for polyuria.   Genitourinary: Negative for difficulty urinating, dysuria and hematuria.   Musculoskeletal: Positive for back pain (lower).   Skin: Negative for rash.   Neurological: Positive for weakness. Negative for syncope, light-headedness and headaches.   Psychiatric/Behavioral: Negative for confusion.       Physical Exam   /62   Pulse 75   Temp 99.9 °F (37.7 °C) (Oral)   Resp 20   Wt 73 kg (161 lb)   LMP 12/06/1988   SpO2 (!) 94%   BMI 23.78 kg/m²      Physical Exam    Nursing note and vitals reviewed.  Constitutional: She appears well-developed and well-nourished. No distress.   HENT:   Head: Normocephalic and atraumatic.   Eyes: Conjunctivae are normal. Pupils are equal, round, and reactive to light.   Neck: Normal range of motion. Neck supple.   Cardiovascular: Normal rate, regular rhythm and normal heart sounds.   Pulmonary/Chest: Effort normal. She has wheezes.   Abdominal: Soft. Normal appearance and bowel sounds are normal. There is generalized abdominal tenderness and tenderness in the left lower quadrant. There is no rebound. Guarding: voluntary.   Abdomen is diffusely tender and worse in the LLQ. Voluntary guarding of  LLQ.    Genitourinary: Rectum:      Guaiac result positive.   Guaiac positive stool. : Acceptable.  Musculoskeletal:      Comments: No point tenderness over Lumbar spine.    Neurological: She is alert and oriented to person, place, and time.   Skin: Skin is warm and dry. Capillary refill takes 2 to 3 seconds.   Psychiatric: She has a normal mood and affect.     CT abdomen with contrast: Findings consistent with colitis involving left colon.  There is also diverticulosis.  The pericolonic fat stranding and fluid is greatest around distal descending colon and combination of acute diverticulitis and colitis could be present but this could all represent colitis.  Additional findings as detailed above including multilevel severe stenosis in the lumbar spine, multiple masses consistent with cysts within the liver and right kidney.    ED Course

## 2020-08-17 NOTE — ED NOTES
Upon transfer to room 206, patient is AAOx4, no cardiac or respiratory complications. No needs or questions at this time.

## 2020-08-17 NOTE — H&P
"[facility] Medicine  History & Physical    Patient Name: Abbey Hagan  MRN: [id number]  Admission Date: 8/17/2020  Attending Physician: Corin Angulo MD   Primary Care Provider: Easton Walker DO         Patient information was obtained from patient, past medical records and ER records.     Subjective:     Principal Problem:Colitis    Chief Complaint:   Chief Complaint   Patient presents with    GI Bleeding     Beginning Saturday night, 1-2 BMs per day with bright red blood        HPI: Abbey Hagan  is a 64 year old  female with a PMHx of HTN, anxiety, depression, arthritis, and emphysema presenting with abdominal pain and hematochezia onset 8/15. Pt reports 4 episodes of diarrhea with bight red stools. She describes the abdominal pain as a cramping sensation that is worse in the LLQ and rates it 10/10.  She denies Denies any alleviating factors and states the pain is exacerbated by movement. She also endorses lower back pain, SOB- chronic and unchanged, weakness, fatigue, decreased appetite, and hemorrhoids. She denies fever, unexpected weight loss, CP, n/v, melena, light headedness, LOC, vision changes, congestion, dysuria, and hematuria. Reports smoking 0.3 ppd and is thinking about quitting and has been cutting back every day.. Reports daily ETOH- hard liquor (wild turkey whiskey) "a few drinks a day" but denies any ETOH since Saturday when the pain began.  Patient denies any acute alcohol withdrawal symptoms.  Patient reports smoking marijuana recreationally.        Past Medical History:   Diagnosis Date    Anxiety     Arthritis     back, neck and knees    Depression     Emphysema of lung     HSV infection     Hypertension        Past Surgical History:   Procedure Laterality Date    HIP ARTHROPLASTY      bilat    JOINT REPLACEMENT      bilat hip       Review of patient's allergies indicates:   Allergen Reactions    Opioids-meperidine and related      " Urine toxicology positive for THC    Penicillin g      Childhood allergy--unknown reaction    Sulfa (sulfonamide antibiotics)      Childhood allergy--unknown reaction       No current facility-administered medications on file prior to encounter.      Current Outpatient Medications on File Prior to Encounter   Medication Sig    albuterol (VENTOLIN HFA) 90 mcg/actuation inhaler Inhale 2 puffs into the lungs every 4 (four) hours as needed for Wheezing or Shortness of Breath. Rescue    albuterol-ipratropium (DUO-NEB) 2.5 mg-0.5 mg/3 mL nebulizer solution Take 3 mLs by nebulization every 6 (six) hours as needed for Wheezing or Shortness of Breath. Rescue    biotin 1 mg Cap Take by mouth.    CHOLECALCIFEROL, VITAMIN D3, (VITAMIN D-3 ORAL) No Sig Provided    escitalopram oxalate (LEXAPRO) 20 MG tablet Take 1 tablet (20 mg total) by mouth once daily.    fluticasone-vilanterol (BREO ELLIPTA) 200-25 mcg/dose DsDv diskus inhaler Inhale 1 puff into the lungs once daily. Controller    gabapentin (NEURONTIN) 100 MG capsule Take 2 capsules (200 mg total) by mouth every evening.    lisinopril-hydrochlorothiazide (PRINZIDE,ZESTORETIC) 10-12.5 mg per tablet Take 1 tablet by mouth once daily.    multivitamin capsule Take 1 capsule by mouth once daily.    POTASSIUM/MAGNESIUM (MAGNESIUM-POTASSIUM ORAL) Take 1 tablet by mouth once daily. Every day    predniSONE (DELTASONE) 20 MG tablet Take 1 tablet (20 mg total) by mouth once daily. Take one daily for 5 days, repeat for breathing problems.    tiotropium bromide (SPIRIVA RESPIMAT) 2.5 mcg/actuation Mist Inhale 2 puffs into the lungs once daily. Controller    vitamin B complex (B COMPLEX 1 ORAL) Take by mouth.     Family History     Problem Relation (Age of Onset)    Alcohol abuse Father    Breast cancer Paternal Aunt    COPD Mother    Colon cancer Maternal Grandmother    Depression Sister    Heart disease Mother    Hypertension Mother, Sister        Tobacco Use    Smoking  status: Current Every Day Smoker     Packs/day: 0.50     Years: 40.00     Pack years: 20.00     Types: Cigarettes    Smokeless tobacco: Never Used    Tobacco comment: Patient down to 8 cigs a day   Substance and Sexual Activity    Alcohol use: Yes     Frequency: 2-3 times a week     Drinks per session: 1 or 2     Binge frequency: Never     Comment: socially    Drug use: Yes     Types: Oxycodone    Sexual activity: Not Currently     Review of Systems   Constitutional: Negative for chills, fatigue and fever.   HENT: Negative for congestion, sore throat and trouble swallowing.    Respiratory: Negative for cough and shortness of breath.    Cardiovascular: Negative for chest pain, palpitations and leg swelling.   Gastrointestinal: Positive for abdominal pain and blood in stool. Negative for diarrhea, nausea and vomiting.   Genitourinary: Negative for decreased urine volume, difficulty urinating, dysuria and frequency.   Musculoskeletal: Negative for arthralgias, back pain and gait problem.   Skin: Negative for color change, pallor, rash and wound.   Neurological: Negative for dizziness, weakness and light-headedness.   Hematological: Negative for adenopathy.   Psychiatric/Behavioral: Negative for agitation, behavioral problems and confusion.   All other systems reviewed and are negative.    Objective:     Vital Signs (Most Recent):  Temp: 99.9 °F (37.7 °C) (08/17/20 0809)  Pulse: 74 (08/17/20 1301)  Resp: 20 (08/17/20 0809)  BP: 136/68 (08/17/20 1301)  SpO2: 95 % (08/17/20 1301) Vital Signs (24h Range):  Temp:  [99.9 °F (37.7 °C)] 99.9 °F (37.7 °C)  Pulse:  [73-89] 74  Resp:  [20] 20  SpO2:  [94 %-95 %] 95 %  BP: (135-225)/() 136/68     Weight: 73 kg (161 lb)  Body mass index is 23.78 kg/m².    Physical Exam  Vitals signs and nursing note reviewed.   Constitutional:       Appearance: She is well-developed.   HENT:      Head: Normocephalic and atraumatic.      Right Ear: External ear normal.      Left Ear:  External ear normal.      Nose: Nose normal.      Mouth/Throat:      Mouth: Mucous membranes are moist.      Pharynx: Oropharynx is clear. No oropharyngeal exudate or posterior oropharyngeal erythema.   Eyes:      General: No scleral icterus.     Conjunctiva/sclera: Conjunctivae normal.      Pupils: Pupils are equal, round, and reactive to light.   Neck:      Musculoskeletal: Normal range of motion and neck supple.      Vascular: No JVD.   Cardiovascular:      Rate and Rhythm: Normal rate and regular rhythm.      Heart sounds: Normal heart sounds. No murmur. No gallop.    Pulmonary:      Effort: Pulmonary effort is normal. No respiratory distress.      Breath sounds: No stridor. Wheezing present. No rhonchi or rales.      Comments: Scattered expiratory wheezing  Abdominal:      General: Bowel sounds are normal. There is no distension.      Palpations: Abdomen is soft.      Tenderness: There is abdominal tenderness.      Comments: Tender to left lower quadrant   Musculoskeletal: Normal range of motion.         General: No swelling or tenderness.   Skin:     General: Skin is warm and dry.      Capillary Refill: Capillary refill takes 2 to 3 seconds.      Coloration: Skin is not jaundiced or pale.      Findings: No erythema or rash.   Neurological:      General: No focal deficit present.      Mental Status: She is alert and oriented to person, place, and time.      Cranial Nerves: No cranial nerve deficit.      Sensory: No sensory deficit.      Motor: No weakness.      Coordination: Coordination normal.      Gait: Gait normal.   Psychiatric:         Mood and Affect: Mood normal.         Behavior: Behavior normal.         Thought Content: Thought content normal.           CRANIAL NERVES     CN III, IV, VI   Pupils are equal, round, and reactive to light.       Significant Labs:   CBC:   Recent Labs   Lab 08/17/20  0844   WBC 16.55*   HGB 15.5   HCT 46.3        CMP:   Recent Labs   Lab 08/17/20  0844      K  3.4*   CL 99   CO2 27   *   BUN 11   CREATININE 0.8   CALCIUM 9.3   PROT 7.2   ALBUMIN 4.2   BILITOT 1.6*   ALKPHOS 60   AST 21   ALT 19   ANIONGAP 13   EGFRNONAA >60     All pertinent labs within the past 24 hours have been reviewed.    Significant Imaging: I have reviewed all pertinent imaging results/findings within the past 24 hours.    Assessment/Plan:     * Colitis  CT abdomen reviewed  IV Cipro and Flagyl  Gastroenterology consult appreciated  Normal saline at 100 cc/hour  Clear liquid diet          GI bleed  Likely secondary to colitis  Appreciate GI consult  Will monitor H&H  Patient reports rectal bleeding is subsiding with last bowel movement      Tobacco abuse  Assistance with smoking cessation was offered, including:  [x]  Medications  [x]  Counseling  []  Printed Information on Smoking Cessation  []  Referral to a Smoking Cessation Program    Patient was counseled regarding smoking for >10 minutes.        Anxiety and depression  Chronic, stable  Continue home meds      COPD with asthma  Patient's COPD is controlled currently.  Patient is currently off COPD Pathway. Continue scheduled inhalers and monitor respiratory status closely.       Essential hypertension  Chronic, controlled.  Latest blood pressure and vitals reviewed-   Temp:  [99.9 °F (37.7 °C)]   Pulse:  [72-89]   Resp:  [20]   BP: (135-225)/()   SpO2:  [94 %-95 %] .   Home meds for hypertension were reviewed and noted below. Hospital anti-hypertensive changes were made as shown below.  Hypertension Medications             lisinopril-hydrochlorothiazide (PRINZIDE,ZESTORETIC) 10-12.5 mg per tablet Take 1 tablet by mouth once daily.        Will utilize p.r.n. blood pressure medication only if patient's blood pressure greater than  180/110 and she develops symptoms such as worsening chest pain or shortness of breath.        VTE Risk Mitigation (From admission, onward)    SCDs, no anticoagulation secondary to rectal bleeding              Zena Beckwith NP  Department of Hospital Medicine   [facility]

## 2020-08-17 NOTE — ASSESSMENT & PLAN NOTE
Assistance with smoking cessation was offered, including:  [x]  Medications  [x]  Counseling  []  Printed Information on Smoking Cessation  []  Referral to a Smoking Cessation Program    Patient was counseled regarding smoking for >10 minutes.

## 2020-08-17 NOTE — ASSESSMENT & PLAN NOTE
Patient's COPD is controlled currently.  Patient is currently off COPD Pathway. Continue scheduled inhalers and monitor respiratory status closely.

## 2020-08-18 VITALS
DIASTOLIC BLOOD PRESSURE: 61 MMHG | OXYGEN SATURATION: 94 % | TEMPERATURE: 100 F | WEIGHT: 146.63 LBS | SYSTOLIC BLOOD PRESSURE: 122 MMHG | HEART RATE: 58 BPM | RESPIRATION RATE: 18 BRPM | HEIGHT: 69 IN | BODY MASS INDEX: 21.72 KG/M2

## 2020-08-18 LAB
ALBUMIN SERPL BCP-MCNC: 3.4 G/DL (ref 3.5–5.2)
ALP SERPL-CCNC: 52 U/L (ref 55–135)
ALT SERPL W/O P-5'-P-CCNC: 15 U/L (ref 10–44)
ANION GAP SERPL CALC-SCNC: 9 MMOL/L (ref 8–16)
AST SERPL-CCNC: 18 U/L (ref 10–40)
BILIRUB SERPL-MCNC: 1.1 MG/DL (ref 0.1–1)
BUN SERPL-MCNC: 7 MG/DL (ref 8–23)
CALCIUM SERPL-MCNC: 8.3 MG/DL (ref 8.7–10.5)
CHLORIDE SERPL-SCNC: 99 MMOL/L (ref 95–110)
CO2 SERPL-SCNC: 29 MMOL/L (ref 23–29)
CREAT SERPL-MCNC: 0.7 MG/DL (ref 0.5–1.4)
ERYTHROCYTE [DISTWIDTH] IN BLOOD BY AUTOMATED COUNT: 12 % (ref 11.5–14.5)
EST. GFR  (AFRICAN AMERICAN): >60 ML/MIN/1.73 M^2
EST. GFR  (NON AFRICAN AMERICAN): >60 ML/MIN/1.73 M^2
GLUCOSE SERPL-MCNC: 104 MG/DL (ref 70–110)
HCT VFR BLD AUTO: 38.7 % (ref 37–48.5)
HGB BLD-MCNC: 12.6 G/DL (ref 12–16)
MAGNESIUM SERPL-MCNC: 1.8 MG/DL (ref 1.6–2.6)
MCH RBC QN AUTO: 32.6 PG (ref 27–31)
MCHC RBC AUTO-ENTMCNC: 32.6 G/DL (ref 32–36)
MCV RBC AUTO: 100 FL (ref 82–98)
PLATELET # BLD AUTO: 183 K/UL (ref 150–350)
PMV BLD AUTO: 10.5 FL (ref 9.2–12.9)
POTASSIUM SERPL-SCNC: 3.5 MMOL/L (ref 3.5–5.1)
PROT SERPL-MCNC: 6.1 G/DL (ref 6–8.4)
RBC # BLD AUTO: 3.86 M/UL (ref 4–5.4)
SODIUM SERPL-SCNC: 137 MMOL/L (ref 136–145)
WBC # BLD AUTO: 12.16 K/UL (ref 3.9–12.7)

## 2020-08-18 PROCEDURE — G0378 HOSPITAL OBSERVATION PER HR: HCPCS

## 2020-08-18 PROCEDURE — 94640 AIRWAY INHALATION TREATMENT: CPT

## 2020-08-18 PROCEDURE — 99213 PR OFFICE/OUTPT VISIT, EST, LEVL III, 20-29 MIN: ICD-10-PCS | Mod: ,,, | Performed by: INTERNAL MEDICINE

## 2020-08-18 PROCEDURE — 85027 COMPLETE CBC AUTOMATED: CPT

## 2020-08-18 PROCEDURE — 96376 TX/PRO/DX INJ SAME DRUG ADON: CPT

## 2020-08-18 PROCEDURE — 25000003 PHARM REV CODE 250: Performed by: NURSE PRACTITIONER

## 2020-08-18 PROCEDURE — 63600175 PHARM REV CODE 636 W HCPCS: Performed by: NURSE PRACTITIONER

## 2020-08-18 PROCEDURE — 99900035 HC TECH TIME PER 15 MIN (STAT)

## 2020-08-18 PROCEDURE — 83735 ASSAY OF MAGNESIUM: CPT

## 2020-08-18 PROCEDURE — S0030 INJECTION, METRONIDAZOLE: HCPCS | Performed by: NURSE PRACTITIONER

## 2020-08-18 PROCEDURE — 94761 N-INVAS EAR/PLS OXIMETRY MLT: CPT

## 2020-08-18 PROCEDURE — 36415 COLL VENOUS BLD VENIPUNCTURE: CPT

## 2020-08-18 PROCEDURE — 80053 COMPREHEN METABOLIC PANEL: CPT

## 2020-08-18 PROCEDURE — 99213 OFFICE O/P EST LOW 20 MIN: CPT | Mod: ,,, | Performed by: INTERNAL MEDICINE

## 2020-08-18 RX ORDER — METRONIDAZOLE 500 MG/1
500 TABLET ORAL EVERY 8 HOURS
Qty: 30 TABLET | Refills: 0 | Status: SHIPPED | OUTPATIENT
Start: 2020-08-18 | End: 2020-08-28

## 2020-08-18 RX ORDER — CIPROFLOXACIN 500 MG/1
500 TABLET ORAL EVERY 12 HOURS
Qty: 20 TABLET | Refills: 0 | Status: SHIPPED | OUTPATIENT
Start: 2020-08-18 | End: 2020-08-28

## 2020-08-18 RX ORDER — HYDROCODONE BITARTRATE AND ACETAMINOPHEN 5; 325 MG/1; MG/1
1 TABLET ORAL EVERY 6 HOURS PRN
Qty: 28 TABLET | Refills: 0 | Status: SHIPPED | OUTPATIENT
Start: 2020-08-18 | End: 2021-09-28 | Stop reason: ALTCHOICE

## 2020-08-18 RX ADMIN — ESCITALOPRAM OXALATE 20 MG: 10 TABLET ORAL at 08:08

## 2020-08-18 RX ADMIN — METRONIDAZOLE 500 MG: 500 INJECTION, SOLUTION INTRAVENOUS at 04:08

## 2020-08-18 RX ADMIN — CIPROFLOXACIN 400 MG: 2 INJECTION, SOLUTION INTRAVENOUS at 10:08

## 2020-08-18 RX ADMIN — FLUTICASONE FUROATE AND VILANTEROL TRIFENATATE 1 PUFF: 200; 25 POWDER RESPIRATORY (INHALATION) at 07:08

## 2020-08-18 RX ADMIN — PREDNISONE 20 MG: 20 TABLET ORAL at 08:08

## 2020-08-18 RX ADMIN — SODIUM CHLORIDE: 0.9 INJECTION, SOLUTION INTRAVENOUS at 03:08

## 2020-08-18 RX ADMIN — HYDROCHLOROTHIAZIDE 12.5 MG: 12.5 TABLET ORAL at 08:08

## 2020-08-18 RX ADMIN — LISINOPRIL 10 MG: 10 TABLET ORAL at 08:08

## 2020-08-18 RX ADMIN — HYDROCODONE BITARTRATE AND ACETAMINOPHEN 1 TABLET: 5; 325 TABLET ORAL at 09:08

## 2020-08-18 RX ADMIN — METRONIDAZOLE 500 MG: 500 INJECTION, SOLUTION INTRAVENOUS at 11:08

## 2020-08-18 RX ADMIN — HYDROCODONE BITARTRATE AND ACETAMINOPHEN 1 TABLET: 5; 325 TABLET ORAL at 03:08

## 2020-08-18 RX ADMIN — THERA TABS 1 TABLET: TAB at 08:08

## 2020-08-18 NOTE — PLAN OF CARE
I sent a message to Lucero Ordonez LPN a message requesting a one week follow up with Dr. Walker .  Appt given for 8/25/20 at 1:00 pm. AVS updated.     I sent a message to Jo Ann Silva LPN requesting a 3 week follow up with Dr. Kimbrough. Leida Freedman, [facility]     08/18/20 1205   Discharge Assessment   Assessment Type Discharge Planning Reassessment

## 2020-08-18 NOTE — RESPIRATORY THERAPY
08/18/20 0724   Patient Assessment/Suction   Level of Consciousness (AVPU) alert   Respiratory Effort Normal   All Lung Fields Breath Sounds diminished   PRE-TX-O2   O2 Device (Oxygen Therapy) room air   SpO2 (!) 94 %   Pulse Oximetry Type Intermittent   $ Pulse Oximetry - Multiple Charge Pulse Oximetry - Multiple   Pulse 77   Resp 12   Aerosol Therapy   $ Aerosol Therapy Charges PRN treatment not required   Inhaler   $ Inhaler Charges MDI (Metered Dose Inahler) Treatment   Respiratory Treatment Status (Inhaler) given   Treatment Route (Inhaler) mouthpiece   Patient Position (Inhaler) HOB elevated   Post Treatment Assessment (Inhaler) breath sounds unchanged   Signs of Intolerance (Inhaler) none

## 2020-08-18 NOTE — PROGRESS NOTES
"[facility] Note    CC: Blood in stool    HPI 64 y.o. female with blood in stool, onset 2 days ago, red in color, associated with LLQ pain, copious amount, with no alleviating/exacerbating factors.  She denies prior history of the same.  She states that her baseline bowel habit consists of mild to moderate constipation with some straining.  She has had looser stool since above symptoms began.  She denies fever or sick contacts.  No recent antibiotics.  Her last colonoscopy was with Dr. Tyler in 2013 and diverticulosis was noted.  Case was discussed with Dr. Kim in the ED who states that she had colitis noted on CT.  CT was independently reviewed and showed sigmoid thickening and stranding.  Patient denies any upper GI complaint and she states that on her most recent trip to the bathroom that bleeding seems to have ceased.      INTERVAL HISTORY:  Since yesterday, patient reports that pain is much improved and bleeding has resolved.  No change in lab values.  Tolerating PO and asking to go home.  No acute issues.  Case discussed with nursing.    Past Medical History:   Diagnosis Date    Anxiety     Arthritis     back, neck and knees    Depression     Emphysema of lung     HSV infection     Hypertension          Review of Systems  General ROS: negative for - chills, fever or weight loss  Cardiovascular ROS: no chest pain or dyspnea on exertion  Gastrointestinal ROS: pain improved    Physical Examination  /61 (BP Location: Left arm, Patient Position: Sitting)   Pulse (!) 58   Temp 99.9 °F (37.7 °C) (Oral)   Resp 18   Ht 5' 9" (1.753 m)   Wt 66.5 kg (146 lb 9.7 oz)   LMP 12/06/1988   SpO2 (!) 94%   Breastfeeding No   BMI 21.65 kg/m²   General appearance: alert, cooperative, no distress  HENT: Normocephalic, atraumatic, neck symmetrical, no nasal discharge, sclera anicteric   Lungs: clear to auscultation bilaterally, symmetric chest wall expansion bilaterally  Heart: regular rate " and rhythm without rub; no displacement of the PMI   Abdomen: soft, NT  Extremities: extremities symmetric; no clubbing, cyanosis, or edema  Neurologic: Alert and oriented X 3, no sensory or motor neurologic deficits      Labs:  Lab Results   Component Value Date    WBC 12.16 08/18/2020    HGB 12.6 08/18/2020    HCT 38.7 08/18/2020     (H) 08/18/2020     08/18/2020           Assessment:   1.  Abnormal CT scan  2.  Colitis versus diverticulitis/diverticular bleeding  3.  LLQ pain        Plan:  1.  Diet as tolerated  2.  Agree with antibiotics  3.  Outpatient colonoscopy in 4-6 weeks to allow for mucosal healing and minimize the risk of perforation.  If bleeding persists/worsens, then urgent colonoscopy can be considered.  4.  OK to discharge with outpatient follow up in 3-4 weeks to discuss above.  GI to sign off.  Call for questions.    Gerard Hernandez MD  [facility]  [address], [address]  [place] [address]  Office: [phone]  Fax: [phone]

## 2020-08-18 NOTE — PLAN OF CARE
Patient alert and oriented. Able to make needs known. Patient lives at home alone and has her sister Yennifer for support. Patient denies needs at this time.      08/18/20 0957   Discharge Assessment   Assessment Type Discharge Planning Assessment   Confirmed/corrected address and phone number on facesheet? Yes   Assessment information obtained from? Patient   Expected Length of Stay (days) 2   Communicated expected length of stay with patient/caregiver yes   Prior to hospitilization cognitive status: Alert/Oriented   Prior to hospitalization functional status: Independent   Current cognitive status: Alert/Oriented   Current Functional Status: Independent   Facility Arrived From: home   Lives With alone   Able to Return to Prior Arrangements yes   Is patient able to care for self after discharge? Unable to determine at this time (comments)   Who are your caregiver(s) and their phone number(s)? Yennifer, sister [phone]   Patient's perception of discharge disposition home or selfcare   Readmission Within the Last 30 Days no previous admission in last 30 days   Patient currently being followed by outpatient case management? No   Patient currently receives any other outside agency services? No   Equipment Currently Used at Home none   Do you have any problems affording any of your prescribed medications? No   Is the patient taking medications as prescribed? yes   Does the patient have transportation home? Yes   Transportation Anticipated family or friend will provide   Dialysis Name and Scheduled days na   Does the patient receive services at the Coumadin Clinic? No   Discharge Plan A Home   Discharge Plan B Home   DME Needed Upon Discharge  none   Patient/Family in Agreement with Plan yes

## 2020-08-18 NOTE — PLAN OF CARE
The pt is discharging home and is clear for discharge from case management. Leida Freedman, MARK     08/18/20 1210   Final Note   Assessment Type Final Discharge Note   Anticipated Discharge Disposition Home   Hospital Follow Up  Appt(s) scheduled? Yes

## 2020-08-18 NOTE — PLAN OF CARE
Pt is calm and cooperative, pt agrees with plan of care, fall precautions maintained, pt ambulated well, only 1 bowel movement during shift with minimal spotty bright red blood, pain controlled with prn medications, iv hydration continued, clear liquid diet tolerated well.

## 2020-08-19 ENCOUNTER — TELEPHONE (OUTPATIENT)
Dept: GASTROENTEROLOGY | Facility: CLINIC | Age: 65
End: 2020-08-19

## 2020-08-19 ENCOUNTER — TELEPHONE (OUTPATIENT)
Dept: MEDSURG UNIT | Facility: HOSPITAL | Age: 65
End: 2020-08-19

## 2020-08-19 NOTE — TELEPHONE ENCOUNTER
Patient scheduled for office follow up    ----- Message from Gerard Hernandez MD sent at 8/18/2020  4:48 PM CDT -----  Outpatient office follow up in 3-4 weeks.

## 2020-08-19 NOTE — TELEPHONE ENCOUNTER
Patient called and scheduled    ----- Message from Zunilda Hernandez sent at 8/19/2020  2:23 PM CDT -----  Regarding: return call  Contact: patient  Type: Needs Medical Advice  Who Called:  patient  Best Call Back Number: [phone] (home)   Additional Information: Patient needs to speak to Latrell about scheduling apt.  Returning his call thanks!

## 2020-08-19 NOTE — HOSPITAL COURSE
Patient admitted for left lower quadrant abdominal pain, diarrhea and bright red blood per rectum.  CT abdomen revealed sigmoid colitis.  Patient was initiated on IV Cipro and Flagyl.  Pain was controlled with IV pain medication.  Patient was evaluated by GI.  Patient's symptoms markedly improved.  Patient had bowel movement on day of discharge with no further significant rectal bleeding.  Patient states symptoms are improved and she desired to go home.  Patient was cleared by GI for discharge home with instructions to follow-up outpatient in 3-4 weeks for colonoscopy.  Patient diet was advanced and she tolerated without complications.  Patient was discharged with prescription for Cipro and Flagyl and Norco for pain.  Patient verbalized understanding of discharge instructions and return precautions.

## 2020-08-19 NOTE — DISCHARGE SUMMARY
"[facility] Medicine  Discharge Summary      Patient Name: Abbey Hagan  MRN: [id number]  Admission Date: 8/17/2020  Hospital Length of Stay: 0 days  Discharge Date and Time: 8/18/2020  3:21 PM  Attending Physician: Colette att. providers found   Discharging Provider: Zena Beckwith NP  Primary Care Provider: Easton Walker DO      HPI:   Abbey Hagan  is a 64 year old  female with a PMHx of HTN, anxiety, depression, arthritis, and emphysema presenting with abdominal pain and hematochezia onset 8/15. Pt reports 4 episodes of diarrhea with bight red stools. She describes the abdominal pain as a cramping sensation that is worse in the LLQ and rates it 10/10.  She denies Denies any alleviating factors and states the pain is exacerbated by movement. She also endorses lower back pain, SOB- chronic and unchanged, weakness, fatigue, decreased appetite, and hemorrhoids. She denies fever, unexpected weight loss, CP, n/v, melena, light headedness, LOC, vision changes, congestion, dysuria, and hematuria. Reports smoking 0.3 ppd and is thinking about quitting and has been cutting back every day.. Reports daily ETOH- hard liquor (wild turkey whiskey) "a few drinks a day" but denies any ETOH since Saturday when the pain began.  Patient denies any acute alcohol withdrawal symptoms.  Patient reports smoking marijuana recreationally.        * No surgery found *      Hospital Course:   Patient admitted for left lower quadrant abdominal pain, diarrhea and bright red blood per rectum.  CT abdomen revealed sigmoid colitis.  Patient was initiated on IV Cipro and Flagyl.  Pain was controlled with IV pain medication.  Patient was evaluated by GI.  Patient's symptoms markedly improved.  Patient had bowel movement on day of discharge with no further significant rectal bleeding.  Patient states symptoms are improved and she desired to go home.  Patient was cleared by GI for discharge home with " instructions to follow-up outpatient in 3-4 weeks for colonoscopy.  Patient diet was advanced and she tolerated without complications.  Patient was discharged with prescription for Cipro and Flagyl and Norco for pain.  Patient verbalized understanding of discharge instructions and return precautions.     Consults:   Consults (From admission, onward)        Status Ordering Provider     Inpatient consult to Gastroenterology  Once     Provider:  Gerard Hernandez MD    Completed LUCRETIA ALBARRAN          No new Assessment & Plan notes have been filed under this hospital service since the last note was generated.  Service: Hospital Medicine    Final Active Diagnoses:    Diagnosis Date Noted POA    PRINCIPAL PROBLEM:  Colitis [K52.9] 08/17/2020 Yes    GI bleed [K92.2] 08/17/2020 Yes    Anxiety and depression [F41.9, F32.9] 08/27/2019 Yes    Tobacco abuse [Z72.0] 08/27/2019 Yes    COPD with asthma [J44.9] 11/14/2018 Yes    Essential hypertension [I10]  Yes      Problems Resolved During this Admission:    Diagnosis Date Noted Date Resolved POA    Rectal bleeding [K62.5] 08/17/2020 08/17/2020 Yes       Discharged Condition: good    Disposition: Home or Self Care    Follow Up:  Follow-up Information     Easton Walker DO On 8/25/2020.    Specialty: Family Medicine  Why: to recheck your symptoms 1 pm   Contact information:  [address]  [place] [address]  [phone]             Gerard Hernandez MD In 3 weeks.    Specialty: Gastroenterology  Why:  I left a message with Jo Ann Silva LPN- Please call if not contacted. Thanks !   Contact information:  [address]  [address]  [place] [address]  [phone]                 Patient Instructions:      Diet Cardiac     Notify your health care provider if you experience any of the following:  temperature >100.4     Notify your health care provider if you experience any of the following:  persistent nausea and vomiting or diarrhea     Notify your health  care provider if you experience any of the following:  severe uncontrolled pain     Notify your health care provider if you experience any of the following:  persistent dizziness, light-headedness, or visual disturbances     Notify your health care provider if you experience any of the following:  increased confusion or weakness     Notify your health care provider if you experience any of the following:  difficulty breathing or increased cough     Activity as tolerated       Significant Diagnostic Studies: Labs:   CMP   Recent Labs   Lab 08/17/20  0844 08/18/20  0528    137   K 3.4* 3.5   CL 99 99   CO2 27 29   * 104   BUN 11 7*   CREATININE 0.8 0.7   CALCIUM 9.3 8.3*   PROT 7.2 6.1   ALBUMIN 4.2 3.4*   BILITOT 1.6* 1.1*   ALKPHOS 60 52*   AST 21 18   ALT 19 15   ANIONGAP 13 9   ESTGFRAFRICA >60 >60   EGFRNONAA >60 >60   , CBC   Recent Labs   Lab 08/17/20  0844 08/17/20 2022 08/18/20  0528   WBC 16.55*  --  12.16   HGB 15.5 13.7 12.6   HCT 46.3 41.2 38.7     --  183    and All labs within the past 24 hours have been reviewed    CT Abdomen Pelvis With Contrast [[id number]] Resulted: 08/17/20 1027   Order Status: Completed Updated: 08/17/20 1030   Narrative:     EXAMINATION:   CT ABDOMEN PELVIS WITH CONTRAST     CLINICAL HISTORY:   LLQ abdominal pain, diverticulitis suspected;     TECHNIQUE:   Low dose axial images, sagittal and coronal reformations were obtained from the lung bases to the pubic symphysis following the IV administration of 75 mL of Omnipaque 350 without     COMPARISON:   10/01/2012     FINDINGS:   There is calcified granuloma left lung base     There are a few scattered low-density lesions in the liver not fully characterized but suggesting cysts and similar in appearance to prior study and prior contrast chest CT 10/24/2017.  Largest today measures 10 mm.  16 mm apparent cyst seen right lobe of the liver along the diaphragm on the prior exam is not seen today.     Slight focal  fatty change suggested along the falciform ligament     No calcified stones the gallbladder or CT findings of acute cholecystitis.  No biliary duct dilatation     Spleen not enlarged     Adrenal glands unremarkable appearance     Pancreas unremarkable appearance     Abdominal aorta no aneurysm     Stomach, bowel, mesentery; normal appearance of the appendix.  Diverticulosis.  Image degradation in the pelvis from metal artifact with bilateral hip replacements.  Circumferential wall thickening of a long segment of colon, distal transverse through splenic flexure and descending colon into the sigmoid colon and with surrounding pericolonic fat stranding more so distal descending colon and trace pericolonic fluid.  There is diverticulosis but findings more suggestive of colitis and diverticulitis.     No free intraperitoneal air.  No abscess     Kidneys, ureters, bladder; symmetrical renal enhancement.  Small subcentimeter low-density masses in the right kidney although too small to reliably characterize suggests and most likely represent cysts.  No hydronephrosis.  Urinary bladder decompressed at the time of the exam     Reproductive organs; not well visualized with the metal artifact.     Osseous structures; bilateral hip arthroplasties.  Degenerative changes.  These disc space narrowing, disc desiccation, degenerative change, disc osteophyte complexes in the spine.  Spondylolisthesis L4-5 with L4 anteriorly positioned relative to L5 by 8 mm.  Spinal canal narrowing appears severe L3-L4 and L4-L5.    Impression:       Findings consistent with colitis involving left colon.  There is also diverticulosis.  The     pericolonic fat stranding and fluid is greatest around distal descending colon and combination of acute diverticulitis and colitis could be present but this could all represent colitis.     Additional findings as detailed above including multilevel severe stenosis in the lumbar spine, multiple masses consistent  with cysts within the liver and right kidney.       Electronically signed by: Emerita Hooks MD   Date: 08/17/2020   Time: 10:27         Pending Diagnostic Studies:     None         Medications:  Reconciled Home Medications:      Medication List      START taking these medications    ciprofloxacin HCl 500 MG tablet  Commonly known as: CIPRO  Take 1 tablet (500 mg total) by mouth every 12 (twelve) hours. for 10 days     HYDROcodone-acetaminophen 5-325 mg per tablet  Commonly known as: NORCO  Take 1 tablet by mouth every 6 (six) hours as needed.     metroNIDAZOLE 500 MG tablet  Commonly known as: FLAGYL  Take 1 tablet (500 mg total) by mouth every 8 (eight) hours. for 10 days        CONTINUE taking these medications    albuterol 90 mcg/actuation inhaler  Commonly known as: VENTOLIN HFA  Inhale 2 puffs into the lungs every 4 (four) hours as needed for Wheezing or Shortness of Breath. Rescue     albuterol-ipratropium 2.5 mg-0.5 mg/3 mL nebulizer solution  Commonly known as: DUO-NEB  Take 3 mLs by nebulization every 6 (six) hours as needed for Wheezing or Shortness of Breath. Rescue     B COMPLEX 1 ORAL  Take by mouth.     biotin 1 mg Cap  Take by mouth.     escitalopram oxalate 20 MG tablet  Commonly known as: LEXAPRO  Take 1 tablet (20 mg total) by mouth once daily.     fluticasone furoate-vilanteroL 200-25 mcg/dose Dsdv diskus inhaler  Commonly known as: BREO ELLIPTA  Inhale 1 puff into the lungs once daily. Controller     gabapentin 100 MG capsule  Commonly known as: NEURONTIN  Take 2 capsules (200 mg total) by mouth every evening.     lisinopriL-hydrochlorothiazide 10-12.5 mg per tablet  Commonly known as: PRINZIDE,ZESTORETIC  Take 1 tablet by mouth once daily.     MAGNESIUM-POTASSIUM ORAL  Take 1 tablet by mouth once daily. Every day     multivitamin capsule  Take 1 capsule by mouth once daily.     predniSONE 20 MG tablet  Commonly known as: DELTASONE  Take 1 tablet (20 mg total) by mouth once daily. Take one daily  for 5 days, repeat for breathing problems.     tiotropium bromide 2.5 mcg/actuation Mist  Commonly known as: SPIRIVA RESPIMAT  Inhale 2 puffs into the lungs once daily. Controller     VITAMIN D-3 ORAL  No Sig Provided            Indwelling Lines/Drains at time of discharge:   Lines/Drains/Airways     None                 Time spent on the discharge of patient: 31minutes  Patient was seen and examined on the date of discharge and determined to be suitable for discharge.    Physical exam-awake alert orient x4, pleasant no acute distress  Heart-regular rate and rhythm no edema  Lungs-Clear to auscultation bilaterally unlabored  Abdomen-soft, nontender  Extremities moving all equally       Zena Beckwith NP  Department of Hospital Medicine  [facility]

## 2020-08-25 ENCOUNTER — OFFICE VISIT (OUTPATIENT)
Dept: FAMILY MEDICINE | Facility: CLINIC | Age: 65
End: 2020-08-25
Payer: COMMERCIAL

## 2020-08-25 VITALS
SYSTOLIC BLOOD PRESSURE: 152 MMHG | TEMPERATURE: 99 F | DIASTOLIC BLOOD PRESSURE: 86 MMHG | HEART RATE: 82 BPM | OXYGEN SATURATION: 92 % | BODY MASS INDEX: 23.77 KG/M2 | WEIGHT: 160.5 LBS | HEIGHT: 69 IN

## 2020-08-25 DIAGNOSIS — K52.9 COLITIS: Primary | ICD-10-CM

## 2020-08-25 DIAGNOSIS — Z13.220 SCREENING FOR LIPID DISORDERS: ICD-10-CM

## 2020-08-25 DIAGNOSIS — Z11.4 SCREENING FOR HIV (HUMAN IMMUNODEFICIENCY VIRUS): ICD-10-CM

## 2020-08-25 DIAGNOSIS — M54.32 BILATERAL SCIATICA: ICD-10-CM

## 2020-08-25 DIAGNOSIS — J44.89 COPD WITH ASTHMA: ICD-10-CM

## 2020-08-25 DIAGNOSIS — F41.9 ANXIETY AND DEPRESSION: ICD-10-CM

## 2020-08-25 DIAGNOSIS — F32.A ANXIETY AND DEPRESSION: ICD-10-CM

## 2020-08-25 DIAGNOSIS — R73.03 PREDIABETES: ICD-10-CM

## 2020-08-25 DIAGNOSIS — Z86.010 HISTORY OF COLON POLYPS: ICD-10-CM

## 2020-08-25 DIAGNOSIS — M54.31 BILATERAL SCIATICA: ICD-10-CM

## 2020-08-25 DIAGNOSIS — I10 ESSENTIAL HYPERTENSION: ICD-10-CM

## 2020-08-25 PROBLEM — K92.2 GI BLEED: Status: RESOLVED | Noted: 2020-08-17 | Resolved: 2020-08-25

## 2020-08-25 PROCEDURE — 99999 PR PBB SHADOW E&M-EST. PATIENT-LVL IV: ICD-10-PCS | Mod: PBBFAC,,, | Performed by: FAMILY MEDICINE

## 2020-08-25 PROCEDURE — 99214 PR OFFICE/OUTPT VISIT, EST, LEVL IV, 30-39 MIN: ICD-10-PCS | Mod: S$GLB,,, | Performed by: FAMILY MEDICINE

## 2020-08-25 PROCEDURE — 99214 OFFICE O/P EST MOD 30 MIN: CPT | Mod: S$GLB,,, | Performed by: FAMILY MEDICINE

## 2020-08-25 PROCEDURE — 99999 PR PBB SHADOW E&M-EST. PATIENT-LVL IV: CPT | Mod: PBBFAC,,, | Performed by: FAMILY MEDICINE

## 2020-08-25 RX ORDER — ALBUTEROL SULFATE 90 UG/1
2 AEROSOL, METERED RESPIRATORY (INHALATION) EVERY 4 HOURS PRN
Qty: 18 G | Refills: 3 | OUTPATIENT
Start: 2020-08-25 | End: 2020-11-11 | Stop reason: SDUPTHER

## 2020-08-25 RX ORDER — ESCITALOPRAM OXALATE 20 MG/1
20 TABLET ORAL DAILY
Qty: 90 TABLET | Refills: 3 | Status: SHIPPED | OUTPATIENT
Start: 2020-08-25 | End: 2021-11-01 | Stop reason: SDUPTHER

## 2020-08-25 RX ORDER — GABAPENTIN 100 MG/1
200 CAPSULE ORAL NIGHTLY
Qty: 180 CAPSULE | Refills: 3 | Status: SHIPPED | OUTPATIENT
Start: 2020-08-25 | End: 2021-09-28 | Stop reason: SDUPTHER

## 2020-08-25 RX ORDER — LISINOPRIL AND HYDROCHLOROTHIAZIDE 10; 12.5 MG/1; MG/1
1 TABLET ORAL DAILY
Qty: 90 TABLET | Refills: 3 | Status: SHIPPED | OUTPATIENT
Start: 2020-08-25 | End: 2021-09-01 | Stop reason: SDUPTHER

## 2020-08-25 NOTE — PROGRESS NOTES
"Subjective:       Patient ID: Abbey Hagan is a 64 y.o. female.    Chief Complaint: Hospital Follow Up    HPI   Patient presents for hospital follow-up and medication refills.  She was last seen by me on 08/20/2019 to establish care.  She presented to the [facility] on 08/17/2020 complaining of 2 days of severe abdominal pain, back pain, diarrhea, cramping, bright red blood per rectum, fatigue, weakness, decreased appetite and hemorrhoids as well as shortness of breath at her baseline with continued smoking.  Workup in the ER revealed an elevated white blood cell count and evidence of sigmoid colitis on abdominal CT and she was admitted to the hospital and started on IV Cipro and Flagyl.  Gastroenterology followed her in the hospital and symptoms markedly improved overnight with appetite returning to normal and diet tolerated without any problems with a normal bowel movement before discharge on 08/18/2020.  She was sent home with a prescription for 10 days oral Cipro and Flagyl as well as hydrocodone for pain control if needed and was recommended follow-up with gastroenterology in 3-4 weeks to complete a colonoscopy.  She reports since discharge she has been just fine and reports a very good appetite "I'm eating cheeseburgers and everything just fine.  No more pain or any blood or anything anymore".  She reports bowel movements every 1-2 days that been well-formed, brown and of normal caliber without any pains, dark/black/tarry or bloody stools, any mucus or other abnormalities.  She reports her blood pressures are high today as she ran of her Prinzide 4 days ago.  She reports on the medication all of her blood pressures were well controlled checking frequently at home with not one blood pressure 140/90 or above since last seen.  She also reports she needs a refill of her albuterol HFA and reports she only has her DuoNeb nebulizer treatments she can use if needed at home but she cannot take these with her.  " "She reports no breathing problems at all in past months.  She is also requesting refill of her Neurontin and Lexapro but she has not run out of these.  She reports both medications are working well and tells me that after her mother passed away that she was caring for her stressor significantly reduced and she has not had any significant anxiety or depression problems.  Her discharge summary was reviewed with her as below.          [facility] Medicine  Discharge Summary        Patient Name: Abbey Hagan  MRN: [id number]  Admission Date: 8/17/2020  Hospital Length of Stay: 0 days  Discharge Date and Time: 8/18/2020  3:21 PM  Attending Physician: Colette att. providers found   Discharging Provider: Zena Beckwith NP  Primary Care Provider: Easton Walker DO        HPI:   Abbey Hagan  is a 64 year old  female with a PMHx of HTN, anxiety, depression, arthritis, and emphysema presenting with abdominal pain and hematochezia onset 8/15. Pt reports 4 episodes of diarrhea with bight red stools. She describes the abdominal pain as a cramping sensation that is worse in the LLQ and rates it 10/10.  She denies Denies any alleviating factors and states the pain is exacerbated by movement. She also endorses lower back pain, SOB- chronic and unchanged, weakness, fatigue, decreased appetite, and hemorrhoids. She denies fever, unexpected weight loss, CP, n/v, melena, light headedness, LOC, vision changes, congestion, dysuria, and hematuria. Reports smoking 0.3 ppd and is thinking about quitting and has been cutting back every day.. Reports daily ETOH- hard liquor (wild turkey whiskey) "a few drinks a day" but denies any ETOH since Saturday when the pain began.  Patient denies any acute alcohol withdrawal symptoms.  Patient reports smoking marijuana recreationally.         * No surgery found *       Hospital Course:   Patient admitted for left lower quadrant abdominal pain, diarrhea and " bright red blood per rectum.  CT abdomen revealed sigmoid colitis.  Patient was initiated on IV Cipro and Flagyl.  Pain was controlled with IV pain medication.  Patient was evaluated by GI.  Patient's symptoms markedly improved.  Patient had bowel movement on day of discharge with no further significant rectal bleeding.  Patient states symptoms are improved and she desired to go home.  Patient was cleared by GI for discharge home with instructions to follow-up outpatient in 3-4 weeks for colonoscopy.  Patient diet was advanced and she tolerated without complications.  Patient was discharged with prescription for Cipro and Flagyl and Norco for pain.  Patient verbalized understanding of discharge instructions and return precautions.      Consults:           Consults (From admission, onward)         Status Ordering Provider       Inpatient consult to Gastroenterology  Once     Provider:  MD Ashley Roman MATTHEW B.             No new Assessment & Plan notes have been filed under this hospital service since the last note was generated.  Service: Hospital Medicine            Final Active Diagnoses:     Diagnosis Date Noted POA    PRINCIPAL PROBLEM:  Colitis [K52.9] 08/17/2020 Yes    GI bleed [K92.2] 08/17/2020 Yes    Anxiety and depression [F41.9, F32.9] 08/27/2019 Yes    Tobacco abuse [Z72.0] 08/27/2019 Yes    COPD with asthma [J44.9] 11/14/2018 Yes    Essential hypertension [I10]   Yes       Problems Resolved During this Admission:     Diagnosis Date Noted Date Resolved POA    Rectal bleeding [K62.5] 08/17/2020 08/17/2020 Yes         Discharged Condition: good     Disposition: Home or Self Care        Review of Systems   Constitutional: Negative for activity change, appetite change, fatigue and unexpected weight change.   HENT: Positive for hearing loss (Unchanged.). Negative for congestion, sore throat, trouble swallowing and voice change.    Eyes: Negative for visual disturbance.  "  Respiratory: Negative for cough, chest tightness, shortness of breath and wheezing.    Cardiovascular: Negative for chest pain, palpitations and leg swelling.   Gastrointestinal: Negative for abdominal distention, abdominal pain, anal bleeding, blood in stool, constipation, diarrhea, nausea, rectal pain and vomiting.   Genitourinary: Negative for difficulty urinating, dysuria, flank pain and frequency.   Musculoskeletal: Negative for arthralgias, back pain, gait problem, joint swelling, myalgias, neck pain and neck stiffness.   Skin: Negative for rash and wound.   Neurological: Negative for dizziness, tremors, syncope, weakness and headaches.   Hematological: Negative for adenopathy. Does not bruise/bleed easily.   Psychiatric/Behavioral: Negative for agitation, behavioral problems, confusion, decreased concentration, dysphoric mood, self-injury, sleep disturbance and suicidal ideas. The patient is not nervous/anxious.          Objective:      Vitals:    08/25/20 1307 08/25/20 1328   BP: (!) 150/88 (!) 152/86   Pulse: 82    Temp: 98.7 °F (37.1 °C)    SpO2: (!) 92%    Weight: 72.8 kg (160 lb 7.9 oz)    Height: 5' 9" (1.753 m)    PainSc: 0-No pain      Physical Exam  Vitals signs and nursing note reviewed.   Constitutional:       General: She is not in acute distress.     Appearance: She is well-developed. She is not diaphoretic.   HENT:      Head: Normocephalic and atraumatic.   Cardiovascular:      Rate and Rhythm: Normal rate and regular rhythm.      Heart sounds: Normal heart sounds. No murmur. No friction rub. No gallop.    Pulmonary:      Effort: Pulmonary effort is normal. No respiratory distress.      Breath sounds: Decreased breath sounds present. No wheezing, rhonchi or rales.      Comments: Her lungs sound tight throughout but there are no wheezes, rhonchi or rales present.  No E to A changes or dullness to percussion.  Chest:      Chest wall: No tenderness. There is no dullness to percussion. "   Abdominal:      General: Bowel sounds are normal. There is no distension.      Palpations: Abdomen is soft. There is no mass or pulsatile mass.      Tenderness: There is no abdominal tenderness. There is no right CVA tenderness, left CVA tenderness, guarding or rebound. Negative signs include Lennon's sign and McBurney's sign.      Hernia: No hernia is present.   Musculoskeletal: Normal range of motion.         General: No tenderness or deformity.   Skin:     General: Skin is warm and dry.   Neurological:      Mental Status: She is alert and oriented to person, place, and time.   Psychiatric:         Attention and Perception: Attention and perception normal.         Mood and Affect: Mood and affect normal.         Speech: Speech normal.         Behavior: Behavior normal. Behavior is cooperative.         Thought Content: Thought content normal.         Cognition and Memory: Cognition normal.         Judgment: Judgment normal.      Comments: She is well dressed, very pleasant, smiles frequently and is very talkative.  She has good eye contact and has no difficulty answering questioning or following commands.           Assessment:       1. Colitis    2. History of colon polyps    3. Prediabetes    4. Essential hypertension    5. Screening for HIV (human immunodeficiency virus)    6. Screening for lipid disorders    7. COPD with asthma    8. Bilateral sciatica    9. Anxiety and depression          Plan:   Colitis  -     Ambulatory referral/consult to Gastroenterology; Future; Expected date: 09/01/2020    History of colon polyps  -     Ambulatory referral/consult to Gastroenterology; Future; Expected date: 09/01/2020    Prediabetes  -     Hemoglobin A1C; Future; Expected date: 08/25/2020    Essential hypertension  -     TSH; Future; Expected date: 08/25/2020  -     Microalbumin/creatinine urine ratio; Future  -     lisinopriL-hydrochlorothiazide (PRINZIDE,ZESTORETIC) 10-12.5 mg per tablet; Take 1 tablet by mouth once  daily.  Dispense: 90 tablet; Refill: 3    Screening for HIV (human immunodeficiency virus)  -     HIV 1/2 Ag/Ab (4th Gen); Future; Expected date: 08/25/2020    Screening for lipid disorders  -     Lipid Panel; Future; Expected date: 08/25/2020    COPD with asthma  -     albuterol (VENTOLIN HFA) 90 mcg/actuation inhaler; Inhale 2 puffs into the lungs every 4 (four) hours as needed for Wheezing or Shortness of Breath. Rescue  Dispense: 18 g; Refill: 3    Bilateral sciatica  -     gabapentin (NEURONTIN) 100 MG capsule; Take 2 capsules (200 mg total) by mouth every evening.  Dispense: 180 capsule; Refill: 3    Anxiety and depression  -     escitalopram oxalate (LEXAPRO) 20 MG tablet; Take 1 tablet (20 mg total) by mouth once daily.  Dispense: 90 tablet; Refill: 3      Follow up in about 2 weeks (around 9/8/2020).    Abbey appears to be stable and doing very well today with exception of running out of her Prinzide causing elevated blood pressures.  She reports blood pressures were well controlled on the medication at home chronically and I did agree to provide her with this refill today.  I recommended she check her blood pressures once daily, record these in the blood pressure log provided to and that she return in 2 weeks for annual wellness physical with me.  She was in agreement this.  I discussed ordering labs as above as her CBC and CMP at hospital discharge were both essentially normal.  She was in agreement with these but was not fasting will return to complete these later this week or early next week.  As she has run out of her albuterol HFA and apparently has had difficulty getting the refill from pulmonology I did agree to provide her with this today.  She is doing well her other inhaler with her breathing at her baseline.  Is of Neurontin has controlled her sciatica symptoms and with use of Lexapro she has not been having any anxiety or depression symptoms at all.  She would like to continue on these and  have provided her with refills unchanged.  She will continue on her antibiotics to completion and will alert me if she has any signs or symptoms of recurrent colitis.  I have made referral to Gastroenterology as requested for her recommended follow-up colonoscopy.

## 2020-09-01 ENCOUNTER — LAB VISIT (OUTPATIENT)
Dept: LAB | Facility: HOSPITAL | Age: 65
End: 2020-09-01
Attending: FAMILY MEDICINE
Payer: COMMERCIAL

## 2020-09-01 DIAGNOSIS — R73.03 PREDIABETES: ICD-10-CM

## 2020-09-01 DIAGNOSIS — Z13.220 SCREENING FOR LIPID DISORDERS: ICD-10-CM

## 2020-09-01 DIAGNOSIS — I10 ESSENTIAL HYPERTENSION: ICD-10-CM

## 2020-09-01 DIAGNOSIS — Z11.4 SCREENING FOR HIV (HUMAN IMMUNODEFICIENCY VIRUS): ICD-10-CM

## 2020-09-01 LAB
CHOLEST SERPL-MCNC: 156 MG/DL (ref 120–199)
CHOLEST/HDLC SERPL: 2.9 {RATIO} (ref 2–5)
ESTIMATED AVG GLUCOSE: 114 MG/DL (ref 68–131)
HBA1C MFR BLD HPLC: 5.6 % (ref 4–5.6)
HDLC SERPL-MCNC: 53 MG/DL (ref 40–75)
HDLC SERPL: 34 % (ref 20–50)
LDLC SERPL CALC-MCNC: 89.8 MG/DL (ref 63–159)
NONHDLC SERPL-MCNC: 103 MG/DL
TRIGL SERPL-MCNC: 66 MG/DL (ref 30–150)
TSH SERPL DL<=0.005 MIU/L-ACNC: 0.77 UIU/ML (ref 0.4–4)

## 2020-09-01 PROCEDURE — 80061 LIPID PANEL: CPT

## 2020-09-01 PROCEDURE — 86703 HIV-1/HIV-2 1 RESULT ANTBDY: CPT

## 2020-09-01 PROCEDURE — 36415 COLL VENOUS BLD VENIPUNCTURE: CPT | Mod: PO

## 2020-09-01 PROCEDURE — 83036 HEMOGLOBIN GLYCOSYLATED A1C: CPT

## 2020-09-01 PROCEDURE — 84443 ASSAY THYROID STIM HORMONE: CPT

## 2020-09-02 LAB — HIV 1+2 AB+HIV1 P24 AG SERPL QL IA: NEGATIVE

## 2020-09-08 ENCOUNTER — OFFICE VISIT (OUTPATIENT)
Dept: FAMILY MEDICINE | Facility: CLINIC | Age: 65
End: 2020-09-08
Payer: COMMERCIAL

## 2020-09-08 VITALS
WEIGHT: 157.63 LBS | HEIGHT: 69 IN | DIASTOLIC BLOOD PRESSURE: 84 MMHG | OXYGEN SATURATION: 95 % | BODY MASS INDEX: 23.35 KG/M2 | HEART RATE: 84 BPM | TEMPERATURE: 98 F | SYSTOLIC BLOOD PRESSURE: 100 MMHG

## 2020-09-08 DIAGNOSIS — F41.9 ANXIETY AND DEPRESSION: ICD-10-CM

## 2020-09-08 DIAGNOSIS — F32.A ANXIETY AND DEPRESSION: ICD-10-CM

## 2020-09-08 DIAGNOSIS — I10 ESSENTIAL HYPERTENSION: ICD-10-CM

## 2020-09-08 DIAGNOSIS — Z72.0 TOBACCO ABUSE: ICD-10-CM

## 2020-09-08 DIAGNOSIS — J44.89 COPD WITH ASTHMA: ICD-10-CM

## 2020-09-08 DIAGNOSIS — Z12.31 SCREENING MAMMOGRAM, ENCOUNTER FOR: ICD-10-CM

## 2020-09-08 DIAGNOSIS — Z00.00 WELLNESS EXAMINATION: Primary | ICD-10-CM

## 2020-09-08 DIAGNOSIS — R73.03 PREDIABETES: ICD-10-CM

## 2020-09-08 DIAGNOSIS — M15.9 PRIMARY OSTEOARTHRITIS INVOLVING MULTIPLE JOINTS: ICD-10-CM

## 2020-09-08 PROCEDURE — 99396 PREV VISIT EST AGE 40-64: CPT | Mod: S$GLB,,, | Performed by: FAMILY MEDICINE

## 2020-09-08 PROCEDURE — 99999 PR PBB SHADOW E&M-EST. PATIENT-LVL IV: CPT | Mod: PBBFAC,,, | Performed by: FAMILY MEDICINE

## 2020-09-08 PROCEDURE — 99999 PR PBB SHADOW E&M-EST. PATIENT-LVL IV: ICD-10-PCS | Mod: PBBFAC,,, | Performed by: FAMILY MEDICINE

## 2020-09-08 PROCEDURE — 99396 PR PREVENTIVE VISIT,EST,40-64: ICD-10-PCS | Mod: S$GLB,,, | Performed by: FAMILY MEDICINE

## 2020-09-08 NOTE — PROGRESS NOTES
"Subjective:       Patient ID: Abbey Hagan is a 64 y.o. female.    Chief Complaint: Follow-up (2 weeks)    HPI   Patient presents for her annual wellness physical and 2 week blood pressure recheck after restarting on her Prinzide.  She reports I am just fine.  I saw the labs so I do not know why you even had me come back.  It looks like they say I'm normal."  She reports no problems with restarting on Prinzide and has been checking her blood pressures but did not return with her blood pressure log or automated blood pressure cuff.  She reports blood pressures running in the 100s-110s/70s-80s at home.  She has not had any further symptoms of colitis and has a follow-up appointment with Gastroenterology scheduled for 09/22/2020.  She still reports no problems with her breathing even though she continues to smoke and has not filled her inhaler yet as she reports she has not needed it.  Her pulse ox was low at 92% although she reports this is secondary to running around with my head cut off being very busy at work and testing being completed without resting.  Repeat testing increased to 95%.  She reports her arthritis symptoms are all the same and reports her worst back pains are 8/10 but has no pains currently at rest and has no interest in new treatment options at this time.  She denies any issues with anxiety or depression on her current dosing of Lexapro and reports she has been following a low-carbohydrate diet closely in past months and with walking for exercise as tolerated with her back and other joint pains.    Review of Systems   Constitutional: Negative for activity change, appetite change, fatigue and unexpected weight change.   HENT: Positive for hearing loss (Unchanged). Negative for congestion, sore throat, trouble swallowing and voice change.    Eyes: Negative for visual disturbance.   Respiratory: Negative for cough, chest tightness, shortness of breath and wheezing.    Cardiovascular: Negative " "for chest pain, palpitations and leg swelling.   Gastrointestinal: Negative for abdominal pain, blood in stool, constipation, diarrhea, nausea and vomiting.   Genitourinary: Negative for difficulty urinating, dysuria, flank pain, frequency and hematuria.   Musculoskeletal: Negative for arthralgias, back pain and myalgias.   Skin: Negative for rash and wound.   Neurological: Negative for dizziness, tremors, syncope, weakness, light-headedness and headaches.   Hematological: Negative for adenopathy. Does not bruise/bleed easily.   Psychiatric/Behavioral: Negative for dysphoric mood and sleep disturbance. The patient is not nervous/anxious.          Objective:      Vitals:    09/08/20 1353 09/08/20 1406   BP: 100/84    Pulse: 84    Temp: 97.6 °F (36.4 °C)    SpO2: (!) 92% 95%   Weight: 71.5 kg (157 lb 10.1 oz)    Height: 5' 9" (1.753 m)    PainSc:   8    PainLoc: Back      Physical Exam  Vitals signs and nursing note reviewed.   Constitutional:       General: She is not in acute distress.     Appearance: She is well-developed. She is not diaphoretic.   HENT:      Head: Normocephalic and atraumatic.   Eyes:      General: No scleral icterus.        Right eye: No discharge.         Left eye: No discharge.      Conjunctiva/sclera: Conjunctivae normal.   Cardiovascular:      Rate and Rhythm: Normal rate and regular rhythm.      Heart sounds: Normal heart sounds. No murmur. No friction rub. No gallop.    Pulmonary:      Effort: Pulmonary effort is normal. No respiratory distress.      Breath sounds: Normal breath sounds. No wheezing.   Chest:      Chest wall: No tenderness.   Abdominal:      General: Bowel sounds are normal. There is no distension.      Palpations: Abdomen is soft. There is no mass.      Tenderness: There is no abdominal tenderness. There is no guarding or rebound.   Musculoskeletal: Normal range of motion.         General: No tenderness or deformity.      Right lower leg: No edema.      Left lower leg: No " edema.   Skin:     General: Skin is warm and dry.   Neurological:      Mental Status: She is alert and oriented to person, place, and time.   Psychiatric:         Mood and Affect: Mood normal.         Behavior: Behavior normal.         Thought Content: Thought content normal.         Judgment: Judgment normal.           Assessment:       1. Wellness examination    2. Essential hypertension    3. Prediabetes    4. COPD with asthma    5. Tobacco abuse    6. Primary osteoarthritis involving multiple joints    7. Anxiety and depression    8. Screening mammogram, encounter for          Plan:   Wellness examination    Essential hypertension    Prediabetes    COPD with asthma    Tobacco abuse    Primary osteoarthritis involving multiple joints    Anxiety and depression    Screening mammogram, encounter for  -     Mammo Digital Screening Bilat w/ [facility]; Future; Expected date: 09/08/2020      Follow up in about 6 months (around 3/8/2021).    Abbey appears to be stable doing well today on her current medications reporting no breathing issues even with her pulse ox decreased at 92%.  This does normalize upon recheck with rest and I advised her only to fill her albuterol and to use this if she has any chronic cough, wheezing or shortness of breath.  I reviewed the chronic health risks of tobacco abuse and possible smoking cessation options and supports with her again but she continues to report that she still has no interest in quitting at this time.  I advised her if she changes her mind to alert me as we could discuss treatment options again at any time or make referral to the smoking cessation program.  Her blood pressures are well controlled on her current medications and recommended no change in these.  I reviewed her labs completed on 09/01/2020 and all of these were completely normal.  I recommended she continue following a low carbohydrate diet and getting as much daily low-impact aerobic exercise as possible with her  arthritis and COPD/asthma.  She is not having any issues with anxiety or depression and advised her only to alert me symptoms present in the future.  Reviewing health maintenance issues with her she is overdue for Pap/pelvic exam, mammogram and colonoscopy but reports she has only going to complete one thing at a time.  She has her colonoscopy scheduled for 09/22/2020 and reports she will complete her mammogram after this and make an appointment with the gyn hopefully before the new year.  I have placed an order for mammogram that she can complete at her convenience and did offer referral to gyn but she reports she will call when this is necessary.  She is due for Shingrix and influenza vaccination.  I discussed the risks and benefits of these and she was interested in them but unfortunately we are out of both vaccines at this time.  I advised her she could receive these at her local pharmacy or she could call the office in the coming weeks and return any time when available for these.  Discussing follow-up with her, she recommended being seen back at yearly interval and advised her with her past medical history I would prefer seeing her back at 6 month interval.  She reported she was fine with this and will see her back sooner if any problems.

## 2020-09-18 ENCOUNTER — PATIENT OUTREACH (OUTPATIENT)
Dept: ADMINISTRATIVE | Facility: OTHER | Age: 65
End: 2020-09-18

## 2020-09-18 NOTE — PROGRESS NOTES
Chart was reviewed for overdue [facility]active [facility] Encounters ([facility])  topics  Updates were requested from care everywhere  Health Maintenance has been updated  [facility] immunization registry triggered

## 2020-09-23 ENCOUNTER — OFFICE VISIT (OUTPATIENT)
Dept: GASTROENTEROLOGY | Facility: CLINIC | Age: 65
End: 2020-09-23
Payer: COMMERCIAL

## 2020-09-23 VITALS
HEART RATE: 96 BPM | DIASTOLIC BLOOD PRESSURE: 71 MMHG | BODY MASS INDEX: 23.54 KG/M2 | WEIGHT: 159.38 LBS | SYSTOLIC BLOOD PRESSURE: 121 MMHG

## 2020-09-23 DIAGNOSIS — K52.9 COLITIS: Primary | ICD-10-CM

## 2020-09-23 DIAGNOSIS — K57.92 DIVERTICULITIS: ICD-10-CM

## 2020-09-23 DIAGNOSIS — R93.89 ABNORMAL CT SCAN: ICD-10-CM

## 2020-09-23 DIAGNOSIS — K59.09 CHRONIC CONSTIPATION: ICD-10-CM

## 2020-09-23 DIAGNOSIS — Z86.010 HISTORY OF COLON POLYPS: ICD-10-CM

## 2020-09-23 PROCEDURE — 3008F PR BODY MASS INDEX (BMI) DOCUMENTED: ICD-10-PCS | Mod: S$GLB,,, | Performed by: INTERNAL MEDICINE

## 2020-09-23 PROCEDURE — 99214 OFFICE O/P EST MOD 30 MIN: CPT | Mod: S$GLB,,, | Performed by: INTERNAL MEDICINE

## 2020-09-23 PROCEDURE — 99999 PR PBB SHADOW E&M-EST. PATIENT-LVL IV: CPT | Mod: PBBFAC,,, | Performed by: INTERNAL MEDICINE

## 2020-09-23 PROCEDURE — 99999 PR PBB SHADOW E&M-EST. PATIENT-LVL IV: ICD-10-PCS | Mod: PBBFAC,,, | Performed by: INTERNAL MEDICINE

## 2020-09-23 PROCEDURE — 99214 PR OFFICE/OUTPT VISIT, EST, LEVL IV, 30-39 MIN: ICD-10-PCS | Mod: S$GLB,,, | Performed by: INTERNAL MEDICINE

## 2020-09-23 PROCEDURE — 3008F BODY MASS INDEX DOCD: CPT | Mod: S$GLB,,, | Performed by: INTERNAL MEDICINE

## 2020-09-23 NOTE — PROGRESS NOTES
Subjective:       Patient ID: Abbey Hagan is a 64 y.o. female.    This is an established patient.      Chief Complaint: Abnormal CT scan    Patient seen for abnormal CT scan, characterized by colonic thickening, initially associated with some mild BRBPR and some symptoms of colitis.  Seen in the ER about 4 weeks ago and given treatment with antibiotics.  Patient states that she is feeling better and BMs have returned to baseline.  No ongoing bleeding.  She admits to daily BMs but frequently has hard consistency stools and some straining.  She admits to left sided abdominal discomfort.  No weight loss.      Review of Systems   Constitutional: Negative for chills, fatigue and fever.   HENT: Negative for sore throat and trouble swallowing.    Respiratory: Negative for cough, shortness of breath and wheezing.    Cardiovascular: Negative for chest pain and palpitations.   Gastrointestinal: Positive for abdominal pain, change in bowel habit, constipation and change in bowel habit. Negative for blood in stool, nausea and vomiting.   Genitourinary: Negative for dysuria and hematuria.   Musculoskeletal: Negative for arthralgias and myalgias.   Integumentary:  Negative for color change and rash.   Neurological: Negative for dizziness and headaches.   Hematological: Negative for adenopathy.   Psychiatric/Behavioral: Negative for confusion. The patient is not nervous/anxious.    All other systems reviewed and are negative.        Objective:       Vitals:    09/23/20 1609   BP: 121/71   Pulse: 96   Weight: 72.3 kg (159 lb 6.3 oz)       Physical Exam  Constitutional:       Appearance: She is well-developed.   HENT:      Head: Normocephalic and atraumatic.   Eyes:      General: No scleral icterus.     Pupils: Pupils are equal, round, and reactive to light.   Neck:      Musculoskeletal: Normal range of motion.   Cardiovascular:      Rate and Rhythm: Normal rate and regular rhythm.      Heart sounds: No murmur.   Pulmonary:       Effort: Pulmonary effort is normal.      Breath sounds: Normal breath sounds. No wheezing.   Abdominal:      General: Bowel sounds are normal. There is no distension.      Palpations: Abdomen is soft.      Tenderness: There is no abdominal tenderness.   Musculoskeletal:         General: No tenderness.   Lymphadenopathy:      Cervical: No cervical adenopathy.   Skin:     General: Skin is warm and dry.      Findings: No rash.   Neurological:      Mental Status: She is alert.           Lab Results   Component Value Date    WBC 12.16 08/18/2020    HGB 12.6 08/18/2020    HCT 38.7 08/18/2020     (H) 08/18/2020     08/18/2020         CMP  Sodium   Date Value Ref Range Status   08/18/2020 137 136 - 145 mmol/L Final     Potassium   Date Value Ref Range Status   08/18/2020 3.5 3.5 - 5.1 mmol/L Final     Chloride   Date Value Ref Range Status   08/18/2020 99 95 - 110 mmol/L Final     CO2   Date Value Ref Range Status   08/18/2020 29 23 - 29 mmol/L Final     Glucose   Date Value Ref Range Status   08/18/2020 104 70 - 110 mg/dL Final     BUN, Bld   Date Value Ref Range Status   08/18/2020 7 (L) 8 - 23 mg/dL Final     Creatinine   Date Value Ref Range Status   08/18/2020 0.7 0.5 - 1.4 mg/dL Final   10/01/2012 0.8 0.2 - 1.4 mg/dl Final     Calcium   Date Value Ref Range Status   08/18/2020 8.3 (L) 8.7 - 10.5 mg/dL Final   10/01/2012 9.8 8.6 - 10.2 mg/dl Final     Total Protein   Date Value Ref Range Status   08/18/2020 6.1 6.0 - 8.4 g/dL Final     Albumin   Date Value Ref Range Status   08/18/2020 3.4 (L) 3.5 - 5.2 g/dL Final     Total Bilirubin   Date Value Ref Range Status   08/18/2020 1.1 (H) 0.1 - 1.0 mg/dL Final     Comment:     For infants and newborns, interpretation of results should be based  on gestational age, weight and in agreement with clinical  observations.  Premature Infant recommended reference ranges:  Up to 24 hours.............<8.0 mg/dL  Up to 48 hours............<12.0 mg/dL  3-5  days..................<15.0 mg/dL  6-29 days.................<15.0 mg/dL       Alkaline Phosphatase   Date Value Ref Range Status   08/18/2020 52 (L) 55 - 135 U/L Final   10/01/2012 64 23 - 119 UNIT/L Final     AST   Date Value Ref Range Status   08/18/2020 18 10 - 40 U/L Final   10/01/2012 12 10 - 30 UNIT/L Final     ALT   Date Value Ref Range Status   08/18/2020 15 10 - 44 U/L Final     Anion Gap   Date Value Ref Range Status   08/18/2020 9 8 - 16 mmol/L Final   10/01/2012 9 5 - 15 meq/L Final     eGFR if    Date Value Ref Range Status   08/18/2020 >60 >60 mL/min/1.73 m^2 Final     eGFR if non    Date Value Ref Range Status   08/18/2020 >60 >60 mL/min/1.73 m^2 Final     Comment:     Calculation used to obtain the estimated glomerular filtration  rate (eGFR) is the CKD-EPI equation.        CT scan was independently visualized and reviewed by me and showed colonic thickening.    Assessment:       1. Colitis    2. History of colon polyps    3. Diverticulitis    4. Abnormal CT scan    5. Chronic constipation        Plan:       1.  Recommend daily exercise, adequate water intake, and high fiber diet.  Recommend daily miralax (17g PO once or twice daily) with intermittently dosed dulcolax (every 2-3 days)  to facilitate bowel movements.  If no relief with this, consider adding emollient laxative (castor oil or mineral oil) +/- enema.  2.  Schedule colonoscopy for above  3.  Further recommendations to follow after above.

## 2020-09-24 DIAGNOSIS — Z01.818 PRE-OP TESTING: ICD-10-CM

## 2020-09-24 DIAGNOSIS — R93.89 ABNORMAL CT SCAN: Primary | ICD-10-CM

## 2020-10-23 ENCOUNTER — LAB VISIT (OUTPATIENT)
Dept: PRIMARY CARE CLINIC | Facility: CLINIC | Age: 65
End: 2020-10-23
Payer: COMMERCIAL

## 2020-10-23 DIAGNOSIS — Z01.818 PRE-OP TESTING: ICD-10-CM

## 2020-10-23 LAB — SARS-COV-2 RNA RESP QL NAA+PROBE: NOT DETECTED

## 2020-10-23 PROCEDURE — U0003 INFECTIOUS AGENT DETECTION BY NUCLEIC ACID (DNA OR RNA); SEVERE ACUTE RESPIRATORY SYNDROME CORONAVIRUS 2 (SARS-COV-2) (CORONAVIRUS DISEASE [COVID-19]), AMPLIFIED PROBE TECHNIQUE, MAKING USE OF HIGH THROUGHPUT TECHNOLOGIES AS DESCRIBED BY CMS-2020-01-R: HCPCS

## 2020-10-26 ENCOUNTER — ANESTHESIA EVENT (OUTPATIENT)
Dept: ENDOSCOPY | Facility: HOSPITAL | Age: 65
End: 2020-10-26
Payer: COMMERCIAL

## 2020-10-26 ENCOUNTER — HOSPITAL ENCOUNTER (OUTPATIENT)
Facility: HOSPITAL | Age: 65
Discharge: HOME | End: 2020-10-26
Attending: INTERNAL MEDICINE | Admitting: INTERNAL MEDICINE
Payer: COMMERCIAL

## 2020-10-26 ENCOUNTER — ANESTHESIA (OUTPATIENT)
Dept: ENDOSCOPY | Facility: HOSPITAL | Age: 65
End: 2020-10-26
Payer: COMMERCIAL

## 2020-10-26 VITALS
HEART RATE: 60 BPM | RESPIRATION RATE: 14 BRPM | WEIGHT: 160 LBS | BODY MASS INDEX: 23.7 KG/M2 | DIASTOLIC BLOOD PRESSURE: 68 MMHG | TEMPERATURE: 98 F | OXYGEN SATURATION: 97 % | SYSTOLIC BLOOD PRESSURE: 150 MMHG | HEIGHT: 69 IN

## 2020-10-26 DIAGNOSIS — K63.5 POLYP OF COLON, UNSPECIFIED PART OF COLON, UNSPECIFIED TYPE: Primary | ICD-10-CM

## 2020-10-26 DIAGNOSIS — K57.90 DIVERTICULOSIS: ICD-10-CM

## 2020-10-26 DIAGNOSIS — K64.8 INTERNAL HEMORRHOIDS: ICD-10-CM

## 2020-10-26 DIAGNOSIS — R93.89 ABNORMAL CT SCAN: ICD-10-CM

## 2020-10-26 PROCEDURE — 45385 COLONOSCOPY W/LESION REMOVAL: CPT | Performed by: INTERNAL MEDICINE

## 2020-10-26 PROCEDURE — 88305 TISSUE EXAM BY PATHOLOGIST: CPT | Performed by: PATHOLOGY

## 2020-10-26 PROCEDURE — 37000008 HC ANESTHESIA 1ST 15 MINUTES: Performed by: INTERNAL MEDICINE

## 2020-10-26 PROCEDURE — D9220A PRA ANESTHESIA: Mod: ,,, | Performed by: ANESTHESIOLOGY

## 2020-10-26 PROCEDURE — D9220A PRA ANESTHESIA: ICD-10-PCS | Mod: ,,, | Performed by: ANESTHESIOLOGY

## 2020-10-26 PROCEDURE — 25000003 PHARM REV CODE 250: Performed by: INTERNAL MEDICINE

## 2020-10-26 PROCEDURE — 88305 TISSUE EXAM BY PATHOLOGIST: CPT | Mod: 26,,, | Performed by: PATHOLOGY

## 2020-10-26 PROCEDURE — 37000009 HC ANESTHESIA EA ADD 15 MINS: Performed by: INTERNAL MEDICINE

## 2020-10-26 PROCEDURE — 45385 COLONOSCOPY W/LESION REMOVAL: CPT | Mod: ,,, | Performed by: INTERNAL MEDICINE

## 2020-10-26 PROCEDURE — 45385 PR COLONOSCOPY,REMV LESN,SNARE: ICD-10-PCS | Mod: ,,, | Performed by: INTERNAL MEDICINE

## 2020-10-26 PROCEDURE — 27201089 HC SNARE, DISP (ANY): Performed by: INTERNAL MEDICINE

## 2020-10-26 PROCEDURE — 63600175 PHARM REV CODE 636 W HCPCS: Performed by: NURSE ANESTHETIST, CERTIFIED REGISTERED

## 2020-10-26 PROCEDURE — 88305 TISSUE EXAM BY PATHOLOGIST: ICD-10-PCS | Mod: 26,,, | Performed by: PATHOLOGY

## 2020-10-26 RX ORDER — SODIUM CHLORIDE 9 MG/ML
INJECTION, SOLUTION INTRAVENOUS CONTINUOUS
Status: DISCONTINUED | OUTPATIENT
Start: 2020-10-26 | End: 2020-10-26 | Stop reason: HOSPADM

## 2020-10-26 RX ORDER — PROPOFOL 10 MG/ML
VIAL (ML) INTRAVENOUS
Status: DISCONTINUED | OUTPATIENT
Start: 2020-10-26 | End: 2020-10-26

## 2020-10-26 RX ADMIN — PROPOFOL 120 MG: 10 INJECTION, EMULSION INTRAVENOUS at 09:10

## 2020-10-26 RX ADMIN — PROPOFOL 20 MG: 10 INJECTION, EMULSION INTRAVENOUS at 09:10

## 2020-10-26 RX ADMIN — PROPOFOL 30 MG: 10 INJECTION, EMULSION INTRAVENOUS at 09:10

## 2020-10-26 RX ADMIN — SODIUM CHLORIDE: 0.9 INJECTION, SOLUTION INTRAVENOUS at 08:10

## 2020-10-26 NOTE — PLAN OF CARE
Pt awake, alert, and oriented. No c/o N/V/D, abd pain, or SOB. Sister at bedside. D/C instructions given and explained verbally. Pt verbalizes readiness to go home.

## 2020-10-26 NOTE — H&P
CC: Abnormal CT scan    65 year old female with above. States that symptoms are absent, no alleviating/exacerbating factors. No family history of CA. Positive personal history of polyps. No bleeding or weight loss.     ROS:  No headache, no fever/chills, no chest pain/SOB, no nausea/vomiting/diarrhea/constipation/GI bleeding/abdominal pain, no dysuria/hematuria.    VSSAF   Exam:   Alert and oriented x 3; no apparent distress   PERRLA, sclera anicteric  CV: Regular rate/rhythm, normal PMI   Lungs: Clear bilaterally with no wheeze/rales   Abdomen: Soft, NT/ND, normal bowel sounds   Ext: No cyanosis, clubbing     Impression:   As above    Plan:   Proceed with endoscopy. Further recs to follow.

## 2020-10-26 NOTE — ANESTHESIA POSTPROCEDURE EVALUATION
Anesthesia Post Evaluation    Patient: Abbey Hagan    Procedure(s) Performed: Procedure(s) (LRB):  COLONOSCOPY (N/A)    Final Anesthesia Type: general    Patient location during evaluation: PACU  Patient participation: Yes- Able to Participate  Level of consciousness: awake and alert  Post-procedure vital signs: reviewed and stable  Pain management: adequate  Airway patency: patent    PONV status at discharge: No PONV  Anesthetic complications: no      Cardiovascular status: hemodynamically stable  Respiratory status: unassisted and room air  Hydration status: euvolemic  Follow-up not needed.          Vitals Value Taken Time   /68 10/26/20 1017   Temp 36.5 °C (97.7 °F) 10/26/20 0942   Pulse 60 10/26/20 1017   Resp 14 10/26/20 1017   SpO2 97 % 10/26/20 1017         Event Time   Out of Recovery 10:31:17         Pain/Hector Score: Hector Score: 10 (10/26/2020  9:48 AM)

## 2020-10-26 NOTE — PLAN OF CARE
MD at bedside, explained findings and follow-up care. Pt verbalized understanding with no further questions. Pt ready for d/c.

## 2020-10-26 NOTE — ANESTHESIA PREPROCEDURE EVALUATION
10/26/2020  Abbey Hagan is a 65 y.o., female.    Pre-op Assessment    I have reviewed the Patient Summary Reports.     I have reviewed the Nursing Notes. I have reviewed the NPO Status.   I have reviewed the Medications.     Review of Systems  Anesthesia Hx:  No problems with previous Anesthesia    Social:  Smoker    Cardiovascular:   Hypertension    Pulmonary:   COPD (no home O2) Asthma    Renal/:  Renal/ Normal     Hepatic/GI:  Hepatic/GI Normal Bowel Prep.    Musculoskeletal:   Arthritis     Neurological:  Neurology Normal    Endocrine:  Endocrine Normal    Psych:   Psychiatric History anxiety (needle phobia) depression          Physical Exam  General:  Well nourished    Airway/Jaw/Neck:  Airway Findings: Mouth Opening: Normal Tongue: Normal  General Airway Assessment: Adult, Average  Mallampati: II  Jaw/Neck Findings:  Neck ROM: Normal ROM       Chest/Lungs:  Chest/Lungs Findings: Normal Respiratory Rate, Expiratory Wheezes, Mild, Rhonchi     Heart/Vascular:  Heart Findings: Rate: Normal  Rhythm: Regular Rhythm  Sounds: Normal        Mental Status:  Mental Status Findings:  Cooperative, Alert and Oriented, Anxious         Anesthesia Plan  Type of Anesthesia, risks & benefits discussed:  Anesthesia Type:  general  Patient's Preference:   Intra-op Monitoring Plan: standard ASA monitors  Intra-op Monitoring Plan Comments:   Post Op Pain Control Plan:   Post Op Pain Control Plan Comments:   Induction:   IV  Beta Blocker:  Patient is not currently on a Beta-Blocker (No further documentation required).       Informed Consent: Patient understands risks and agrees with Anesthesia plan.  Questions answered. Anesthesia consent signed with patient.  ASA Score: 2     Day of Surgery Review of History & Physical: I have interviewed and examined the patient. I have reviewed the patient's H&P dated:    H&P  update referred to the provider.         Ready For Surgery From Anesthesia Perspective.

## 2020-10-26 NOTE — [employer] PATIENT INSTRUCTIONS
Discharge Summary/Instructions after an Endoscopic Procedure  Patient Name: Abbey Hagan  Patient MRN: [id number]  Patient YOB: 1955 Monday, October 26, 2020  Gerard Hernandez MD  RESTRICTIONS:  During your procedure today, you received medications for sedation.  These   medications may affect your judgment, balance and coordination.  Therefore,   for 24 hours, you have the following restrictions:   - DO NOT drive a car, operate machinery, make legal/financial decisions,   sign important papers or drink alcohol.    ACTIVITY:  Today: no heavy lifting, straining or running due to procedural   sedation/anesthesia.  The following day: return to full activity including work.  DIET:  Eat and drink normally unless instructed otherwise.     TREATMENT FOR COMMON SIDE EFFECTS:  - Mild abdominal pain, nausea, belching, bloating or excessive gas:  rest,   eat lightly and use a heating pad.  - Sore Throat: treat with throat lozenges and/or gargle with warm salt   water.  - Because air was used during the procedure, expelling large amounts of air   from your rectum or belching is normal.  - If a bowel prep was taken, you may not have a bowel movement for 1-3 days.    This is normal.  SYMPTOMS TO WATCH FOR AND REPORT TO YOUR PHYSICIAN:  1. Abdominal pain or bloating, other than gas cramps.  2. Chest pain.  3. Back pain.  4. Signs of infection such as: chills or fever occurring within 24 hours   after the procedure.  5. Rectal bleeding, which would show as bright red, maroon, or black stools.   (A tablespoon of blood from the rectum is not serious, especially if   hemorrhoids are present.)  6. Vomiting.  7. Weakness or dizziness.  GO DIRECTLY TO THE NEAREST EMERGENCY ROOM IF YOU HAVE ANY OF THE FOLLOWING:      Difficulty breathing              Chills and/or fever over 101 F   Persistent vomiting and/or vomiting blood   Severe abdominal pain   Severe chest pain   Black, tarry stools   Bleeding- more than one  tablespoon   Any other symptom or condition that you feel may need urgent attention  Your doctor recommends these additional instructions:  If any biopsies were taken, your doctors clinic will contact you in 1 to 2   weeks with any results.  - Patient has a contact number available for emergencies.  The signs and   symptoms of potential delayed complications were discussed with the   patient.  Return to normal activities tomorrow.  Written discharge   instructions were provided to the patient.   - High fiber diet.   - Continue present medications.   - Await pathology results.   - Repeat colonoscopy in 5 years for surveillance.   - Discharge patient to home (ambulatory).   - Return to my office PRN.  For questions, problems or results please call your physician - Gerard Hernandez MD at Work:  [phone].  [facility], EMERGENCY ROOM PHONE NUMBER: [phone]  IF A COMPLICATION OR EMERGENCY SITUATION ARISES AND YOU ARE UNABLE TO REACH   YOUR PHYSICIAN - GO DIRECTLY TO THE EMERGENCY ROOM.  Gerard Hernandez MD  10/26/2020 9:39:10 AM  This report has been verified and signed electronically.  [employer]

## 2020-10-26 NOTE — DISCHARGE INSTRUCTIONS
Discharge Instructions: Eating a High-Fiber Diet  Your health care provider has prescribed a high-fiber diet for you. Fiber is what gives strength and structure to plants. Most grains, beans, vegetables, and fruits contain fiber. Foods rich in fiber are often low in calories and fat, but they fill you up more. These foods may also reduce the risk of certain health problems.  There are two types of fiber:  · Insoluble fiber. This is found in whole grains, cereals, and certain fruits and vegetables (such as apple skins, corn, and beans). Insoluble fiber is made up mainly of plant cell walls. It may prevent constipation and reduce the risk of certain types of cancer.  · Soluble fiber. This type of fiber is found in oats, beans, nuts, and certain fruits and vegetables (such as strawberries and peas). Soluble fiber turns to gel in the digestive system, slowing the movement of the digestive tract. It helps control blood sugar levels and can reduce cholesterol, which may help lower the risk of heart disease. Soluble fiber can also help control appetite.     Home care  · Know how much fiber you need a day. The recommended daily amount of fiber is 25 grams for women and 38 grams for men. After age 50, daily fiber needs drop to 21 grams for women and 30 grams for men.  · Ask your doctor about a fiber supplement. (Always take fiber supplements with a large glass of water.)  · Keep track of how much fiber you eat.  · Eat a variety of foods high in fiber.  · Learn to read and understand food labels.  · Ask your healthcare provider how much water you should be drinking.  · Look for these high-fiber foods:  ¨ Whole-grain breads and cereals  § 6 ounces a day give you about 18 grams of fiber (1 ounce is equal to 1 slice of bread, 1 cup of dry cereal, or 1/2 cup of cooked rice).  § Include wheat and oat bran cereals, whole-wheat muffins or toast, and corn tortillas in your meals.  ¨ Fruits   § 2 cups a day give you about 8 grams of  fiber.  § Apples, oranges, strawberries, pears, and bananas are good sources.  § Fruit juice does not contain as much fiber as the fruit it was made from.  ¨ Vegetables  § 2½ cups a day give you about 11 grams of fiber. Add asparagus, carrots, broccoli, peas, and corn to your meals.  ¨ Legumes  § 1/4 cup a day (in place of meat) gives you about 4 grams of fiber. Try navy beans, lentils, chickpeas, and soybeans.  ¨ Seeds   § A small handful of seeds gives you about 3 grams of fiber. Try sunflower seeds.  Follow-up  Make a follow-up appointment with a nutritionist as directed by our staff.  Date Last Reviewed: 6/1/2015 © 2000-2017 [employer]. [address] [place] [address]. All rights reserved. This information is not intended as a substitute for professional medical care. Always follow your healthcare professional's instructions.        Diverticulosis    Diverticulosis means that small pouches have formed in the wall of your large intestine (colon). Most often, this problem causes no symptoms and is common as people age. But the pouches in the colon are at risk of becoming infected. When this happens, the condition is called diverticulitis. Although most people with diverticulosis never develop diverticulitis, it is still not uncommon. Rectal bleeding can also occur and in less common situations, a type of colon inflammation called colitis.  While most people do not have symptoms, some people with diverticulosis may have:  · Abdominal cramps and pain  · Bloating  · Constipation  · Change in bowel habits  Causes  The exact cause of diverticulosis (and diverticulitis) has not been proved, but a few things are associated with the condition:  · Low-fiber diet  · Constipation  · Lack of exercise  Your healthcare provider will talk with you about how to manage your condition. Diet changes may be all that are needed to help control diverticulosis and prevent progression to diverticulitis. If you  develop diverticulitis, you will likely need other treatments.  Home care  You may be told to take fiber supplements daily. Fiber adds bulk to the stool so that it passes through the colon more easily. Stool softeners may be recommended. You may also be given medications for pain relief. Be sure to take all medications as directed.  In the past, people were told to avoid corn, nuts, and seeds. This is no longer necessary.  Follow these guidelines when caring for yourself at home:  · Eat unprocessed foods that are high in fiber. Whole grains, fruits, and vegetables are good choices.  · Drink 6 to 8 glasses of water every day unless your healthcare provider has you limit how much fluid you should have.  · Watch for changes in your bowel movements. Tell your provider if you notice any changes.  · Begin an exercise program. Ask your provider how to get started. Generally, walking is the best.  · Get plenty of rest and sleep.  Follow-up care  Follow up with your healthcare provider, or as advised. Regular visits may be needed to check on your health. Sometimes special procedures such as colonoscopy, are needed after an episode of diverticulitis or blooding. Be sure to keep all your appointments.  If a stool sample was taken, or cultures were done, you should be told if they are positive, or if your treatment needs to be changed. You can call as directed for the results.  If X-rays were done, a radiologist will look at them. You will be told if there is a change in your treatment.  If antibiotics were prescribed, be sure to finish them all.  When to seek medical advice  Call your healthcare provider right away if any of these occur:  · Fever of 100.4°F (38°C) or higher, or as directed by your healthcare provider  · Severe cramps in the lower left side of the abdomen or pain that is getting worse  · Tenderness in the lower left side of the abdomen or worsening pain throughout the abdomen  · Diarrhea or constipation that  doesn't get better within 24 hours  · Nausea and vomiting  · Bleeding from the rectum  Call 911  Call emergency services if any of the following occur:  · Trouble breathing  · Confusion  · Very drowsy or trouble awakening  · Fainting or loss of consciousness  · Rapid heart rate  · Chest pain  Date Last Reviewed: 12/30/2015  © 0726-5222 [employer]. [address] [place] [address], [place] [address]. All rights reserved. This information is not intended as a substitute for professional medical care. Always follow your healthcare professional's instructions.        Discharge Instructions: After Your Surgery  Youve just had surgery. During surgery, you were given medicine called anesthesia to keep you relaxed and free of pain. After surgery, you may have some pain or nausea. This is common. Here are some tips for feeling better and getting well after surgery.     Stay on schedule with your medicine.   Going home  Your healthcare provider will show you how to take care of yourself when you go home. He or she will also answer your questions. Have an adult family member or friend drive you home. For the first 24 hours after your surgery:  · Do not drive or use heavy equipment.  · Do not make important decisions or sign legal papers.  · Do not drink alcohol.  · Have someone stay with you, if needed. He or she can watch for problems and help keep you safe.  Be sure to go to all follow-up visits with your healthcare provider. And rest after your surgery for as long as your healthcare provider tells you to.  Coping with pain  If you have pain after surgery, pain medicine will help you feel better. Take it as told, before pain becomes severe. Also, ask your healthcare provider or pharmacist about other ways to control pain. This might be with heat, ice, or relaxation. And follow any other instructions your surgeon or nurse gives you.  Tips for taking pain medicine  To get the best relief possible, remember these  points:  · Pain medicines can upset your stomach. Taking them with a little food may help.  · Most pain relievers taken by mouth need at least 20 to 30 minutes to start to work.  · Taking medicine on a schedule can help you remember to take it. Try to time your medicine so that you can take it before starting an activity. This might be before you get dressed, go for a walk, or sit down for dinner.  · Constipation is a common side effect of pain medicines. Call your healthcare provider before taking any medicines such as laxatives or stool softeners to help ease constipation. Also ask if you should skip any foods. Drinking lots of fluids and eating foods such as fruits and vegetables that are high in fiber can also help. Remember, do not take laxatives unless your surgeon has prescribed them.  · Drinking alcohol and taking pain medicine can cause dizziness and slow your breathing. It can even be deadly. Do not drink alcohol while taking pain medicine.  · Pain medicine can make you react more slowly to things. Do not drive or run machinery while taking pain medicine.  Your healthcare provider may tell you to take acetaminophen to help ease your pain. Ask him or her how much you are supposed to take each day. Acetaminophen or other pain relievers may interact with your prescription medicines or other over-the-counter (OTC) medicines. Some prescription medicines have acetaminophen and other ingredients. Using both prescription and OTC acetaminophen for pain can cause you to overdose. Read the labels on your OTC medicines with care. This will help you to clearly know the list of ingredients, how much to take, and any warnings. It may also help you not take too much acetaminophen. If you have questions or do not understand the information, ask your pharmacist or healthcare provider to explain it to you before you take the OTC medicine.  Managing nausea  Some people have an upset stomach after surgery. This is often  because of anesthesia, pain, or pain medicine, or the stress of surgery. These tips will help you handle nausea and eat healthy foods as you get better. If you were on a special food plan before surgery, ask your healthcare provider if you should follow it while you get better. These tips may help:  · Do not push yourself to eat. Your body will tell you when to eat and how much.  · Start off with clear liquids and soup. They are easier to digest.  · Next try semi-solid foods, such as mashed potatoes, applesauce, and gelatin, as you feel ready.  · Slowly move to solid foods. Dont eat fatty, rich, or spicy foods at first.  · Do not force yourself to have 3 large meals a day. Instead eat smaller amounts more often.  · Take pain medicines with a small amount of solid food, such as crackers or toast, to avoid nausea.     Call your surgeon if  · You still have pain an hour after taking medicine. The medicine may not be strong enough.  · You feel too sleepy, dizzy, or groggy. The medicine may be too strong.  · You have side effects like nausea, vomiting, or skin changes, such as rash, itching, or hives.       If you have obstructive sleep apnea  You were given anesthesia medicine during surgery to keep you comfortable and free of pain. After surgery, you may have more apnea spells because of this medicine and other medicines you were given. The spells may last longer than usual.   At home:  · Keep using the continuous positive airway pressure (CPAP) device when you sleep. Unless your healthcare provider tells you not to, use it when you sleep, day or night. CPAP is a common device used to treat obstructive sleep apnea.  · Talk with your provider before taking any pain medicine, muscle relaxants, or sedatives. Your provider will tell you about the possible dangers of taking these medicines.  Date Last Reviewed: 12/1/2016  © 6870-9975 [employer]. [address] [place] [address], [place] [address]. All rights  reserved. This information is not intended as a substitute for professional medical care. Always follow your healthcare professional's instructions.

## 2020-10-26 NOTE — TRANSFER OF CARE
"Anesthesia Transfer of Care Note    Patient: Abbey Hagan    Procedure(s) Performed: Procedure(s) (LRB):  COLONOSCOPY (N/A)    Patient location: GI    Anesthesia Type: general    Transport from OR: Transported from OR on room air with adequate spontaneous ventilation    Post pain: adequate analgesia    Post assessment: no apparent anesthetic complications    Post vital signs: stable    Level of consciousness: sedated    Nausea/Vomiting: no nausea/vomiting    Complications: none    Transfer of care protocol was followed      Last vitals:   Visit Vitals  /77 (BP Location: Left arm, Patient Position: Lying)   Pulse 77   Temp 36.8 °C (98.2 °F) (Skin)   Resp 16   Ht 5' 9" (1.753 m)   Wt 72.6 kg (160 lb)   LMP 12/06/1988   SpO2 96%   Breastfeeding No   BMI 23.63 kg/m²     "

## 2020-10-29 ENCOUNTER — PATIENT MESSAGE (OUTPATIENT)
Dept: GASTROENTEROLOGY | Facility: CLINIC | Age: 65
End: 2020-10-29

## 2020-10-29 LAB
FINAL PATHOLOGIC DIAGNOSIS: NORMAL
GROSS: NORMAL
Lab: NORMAL

## 2020-11-11 ENCOUNTER — OFFICE VISIT (OUTPATIENT)
Dept: PULMONOLOGY | Facility: CLINIC | Age: 65
End: 2020-11-11
Payer: COMMERCIAL

## 2020-11-11 VITALS
HEIGHT: 69 IN | SYSTOLIC BLOOD PRESSURE: 125 MMHG | DIASTOLIC BLOOD PRESSURE: 76 MMHG | BODY MASS INDEX: 23.49 KG/M2 | HEART RATE: 79 BPM | WEIGHT: 158.63 LBS | OXYGEN SATURATION: 96 %

## 2020-11-11 DIAGNOSIS — J44.89 COPD WITH ASTHMA: Primary | ICD-10-CM

## 2020-11-11 DIAGNOSIS — J44.9 CHRONIC OBSTRUCTIVE PULMONARY DISEASE, UNSPECIFIED COPD TYPE: Chronic | ICD-10-CM

## 2020-11-11 PROCEDURE — 99999 PR PBB SHADOW E&M-EST. PATIENT-LVL IV: CPT | Mod: PBBFAC,,, | Performed by: NURSE PRACTITIONER

## 2020-11-11 PROCEDURE — 99999 PR PBB SHADOW E&M-EST. PATIENT-LVL IV: ICD-10-PCS | Mod: PBBFAC,,, | Performed by: NURSE PRACTITIONER

## 2020-11-11 PROCEDURE — 99213 PR OFFICE/OUTPT VISIT, EST, LEVL III, 20-29 MIN: ICD-10-PCS | Mod: S$GLB,,, | Performed by: NURSE PRACTITIONER

## 2020-11-11 PROCEDURE — 99213 OFFICE O/P EST LOW 20 MIN: CPT | Mod: S$GLB,,, | Performed by: NURSE PRACTITIONER

## 2020-11-11 PROCEDURE — 3008F PR BODY MASS INDEX (BMI) DOCUMENTED: ICD-10-PCS | Mod: S$GLB,,, | Performed by: NURSE PRACTITIONER

## 2020-11-11 PROCEDURE — 3008F BODY MASS INDEX DOCD: CPT | Mod: S$GLB,,, | Performed by: NURSE PRACTITIONER

## 2020-11-11 RX ORDER — PREDNISONE 20 MG/1
20 TABLET ORAL DAILY
Qty: 25 TABLET | Refills: 2 | Status: SHIPPED | OUTPATIENT
Start: 2020-11-11

## 2020-11-11 RX ORDER — ALBUTEROL SULFATE 90 UG/1
2 AEROSOL, METERED RESPIRATORY (INHALATION) EVERY 4 HOURS PRN
Qty: 18 G | Refills: 11 | Status: SHIPPED | OUTPATIENT
Start: 2020-11-11 | End: 2021-02-19 | Stop reason: SDUPTHER

## 2020-11-11 NOTE — PROGRESS NOTES
11/11/2020    Abbey Hagan  Office Note    Chief Complaint   Patient presents with    Follow-up     6m f/u    Asthma       HPI:   11/11/20- recently discharged [facility] 10/26 for colitis;   Breathing stable, requires albuterol rescue inhaler 1-2x daily, worse with exertion, currently on Breo and Spiriva but was on Trelegy previously felt it worked better. Currently smoking 7-10 daily. Has pet cat and has noticed improvement in mood.     5/13/2020- SOB episode- onset 3 weeks, associated with cough productive, can feel mucous moving around in chest but can not clear out. Albuterol rescue 1-3x daily. On breo daily  smoking has decreased to 7 cigarettes daily,   Mother has moved into a facility, states depression has worsened in past few weeks. On lexapro daily with little benefit, wanting to adopt pet cat.     11/13/19- Rheumatoid factor negative, bilateral wrist and finger joints continue to swell and are painful. SOB- variable, has mostly good days, has SOB occasionally worse with exertion 4-5 x weekly, still currently smoking 1/2 pack daily. Currently on Breo daily and albuterol rescue daily. No recent prednisone use.     August 14, 2019- Breathing unchanged, prednisone/antibiotic 1x in 6 months, currently on Breo daily, Albuterol 1-3x daily. Memory loss is worsening. Cough- occasionally, difficult to get mucous up white in color, Currently smoking 1/2 pack daily.       02/14/2019- went to Urgent care for neck pain tx with ultram and flexeril, Breathing unchanged, smoking unchanged,  Currently using Breo, albuterol inhaler 1-3x daily. Memory loss not as bad.  Cough unchanged. Had sinus issues few weeks prior took azithromycin, resolved. No recent prednisone use.    11/14/2018- still currently smoking 10/day,  Cough daily, productive clear, worse at night. Took prednisone once in last 3 months. . Using trelegy daily, Albuterol inhaler use 1-3 x daily as preventative state SOB once weeking,  States  some short term memory loss, can not remember previous days activities. States large amounts of stress due to caring for dependent mother. Several night time awakenings to care for mother who has a bell used to summon her.    Previous HPI Dr. Cruz: 8/14/18- pt with onset lung dx age 41's.  Has cough and wheezes - noturnally worse.  No seasonal variation.  Still smokes.  Worked as  17 yrs.  No sinus problems. [place]    Exacerbates 2x/y, took prednisone started 4 days ago.  No sinus.           The chief compliant  problem varies with instablilty at time    PFSH:  Past Medical History:   Diagnosis Date    Anxiety     Arthritis     back, neck and knees    Depression     Emphysema of lung     HSV infection     Hypertension          Past Surgical History:   Procedure Laterality Date    COLONOSCOPY N/A 10/26/2020    Procedure: COLONOSCOPY;  Surgeon: Gerard Hernandez MD;  [facility]: [facility];  Service: Endoscopy;  Laterality: N/A;    HIP ARTHROPLASTY      bilat    JOINT REPLACEMENT      bilat hip     Social History     Tobacco Use    Smoking status: Current Every Day Smoker     Packs/day: 0.50     Years: 40.00     Pack years: 20.00     Types: Cigarettes    Smokeless tobacco: Never Used    Tobacco comment: Patient down to 8 cigs a day   Substance Use Topics    Alcohol use: Yes     Frequency: 4 or more times a week     Drinks per session: 1 or 2     Binge frequency: Never     Comment: socially    Drug use: Yes     Types: Oxycodone, Marijuana     Family History   Problem Relation Age of Onset    COPD Mother     Hypertension Mother     Heart disease Mother     Alcohol abuse Father     Depression Sister     Hypertension Sister     Colon cancer Maternal Grandmother     Breast cancer Paternal Aunt     Ovarian cancer Neg Hx     Psoriasis Neg Hx     Lupus Neg Hx     Eczema Neg Hx     Melanoma Neg Hx      Review of patient's allergies indicates:   Allergen Reactions     "Opioids-meperidine and related      Urine toxicology positive for THC    Penicillin g      Childhood allergy--unknown reaction    Sulfa (sulfonamide antibiotics)      Childhood allergy--unknown reaction     I have reviewed past medical, family, and social history. I have reviewed previous nurse notes.    Performance Status:The patient's activity level is functions out of house.          Review of Systems   Constitutional: Negative for activity change, appetite change, chills, diaphoresis, fatigue, fever and unexpected weight change.   HENT: Negative for dental problem, postnasal drip, rhinorrhea, sinus pressure, sinus pain, sneezing, sore throat, trouble swallowing and voice change.    Respiratory: Negative for apnea, and stridor, chest tightness, and wheeze. Positive for cough and SOB w exertion  Cardiovascular: Negative for chest pain, palpitations and leg swelling.    Musculoskeletal: Negative for gait problem, myalgias and neck pain. Positive for swelling in right finger joint, right elbow, bilateral wrist, nodules on joints pt first noticed past year PIP.  Skin: Negative for color change and pallor.   Allergic/Immunologic: Negative for environmental allergies and food allergies.   Neurological: Negative for dizziness, speech difficulty, weakness, light-headedness, numbness and headaches. Positive for bilateral lower extremity numbness in feet  Hematological: Negative for adenopathy. Does not bruise/bleed easily.   Psychiatric/Behavioral: Negative for dysphoric mood. The patient is anxious and positive for sleep disturbance.          Exam:Comprehensive exam done. /76 (BP Location: Left arm, Patient Position: Sitting, BP Method: Medium (Automatic))   Pulse 79   Ht 5' 9" (1.753 m)   Wt 72 kg (158 lb 9.9 oz)   LMP 12/06/1988   SpO2 96% Comment: at room air at rest  BMI 23.42 kg/m²   Exam included Vitals as listed  Constitutional:  oriented to person, place, and time. appears well-developed. No " distress.   Mouth/Throat: Uvula is midline, oropharynx is clear and moist and mucous membranes are normal. No dental caries. No oropharyngeal exudate, posterior oropharyngeal edema, posterior oropharyngeal erythema or tonsillar abscesses.  M[place]atti (M) score 2  Cardiovascular: Normal rate, regular rhythm and normal heart sounds. Exam reveals no gallop and no friction rub.   No murmur heard.  Pulmonary/Chest: Effort normal and breath sounds bilateral expiratory wheeze. No accessory muscle usage or stridor. No apnea and no tachypnea. No respiratory distress, decreased breath sounds, rhonchi, rales, or tenderness.   Lymphadenopathy:  no cervical adenopathy.   Neurological:  alert and oriented to person, place, and time. not disoriented.   Skin: Skin is warm and dry. Capillary refill takes less 2 sec. No cyanosis or erythema. No pallor. Nails show slight clubbing. enlarged PIP joint, right elbow swollen, right wrist edema erythema,   Psychiatric: normal mood and affect. behavior is normal. Judgment and thought content normal.       Radiographs (ct chest and cxr) reviewed: results reviewed by direct vision  XR CHEST PA AND LATERALDate: 08/14/2018  There is linear scarring in the right infra hilum.  The aorta is mildly tortuous.  Degenerative changes are seen in the spine.  No confluent infiltrates are identified.  There is mild hyper aeration suggesting COPD.      Labs reviewed     8/14/19 CBC normal  Results for SHARLENE CHAMORRO (MRN [id number]) as of 11/13/2019 15:35   Ref. Range 8/14/2019 16:38   CCP Antibodies Latest Ref Range: <5.0 U/mL <0.5   Rheumatoid Factor Latest Ref Range: 0.0 - 15.0 IU/mL <10.0     PFT results reviewed  Pulmonary Functions Testing Results:  11/7/18  Spirometry with bronchodilator, lung volume by gas dilution, and diffusion capacity were measured November 7, 2018.  The FEV1 to FVC ratio was 47%.  The FEV1 itself is 38% or 1.1 L.  There was no improvement following bronchodilator.  These values    represent severe airflow obstruction.  Total lung capacity is normal at 90% of predicted.  Residual volume is elevated to 121% of predicted.  There is air trapping.  Diffusion time is reduced but done improves when corrected for lung volumes.  Overall   impression is that patient has very severe airflow obstruction, no significant bronchodilator response, air trapping, and diffusion is somewhat intact.       Plan:  Clinical impression is resonably certain and repeated evaluation prn +/- follow up will be needed as below.    Abbey was seen today for follow-up and asthma.    Diagnoses and all orders for this visit:    COPD with asthma  -     predniSONE (DELTASONE) 20 MG tablet; Take 1 tablet (20 mg total) by mouth once daily. Take one daily for 5 days, repeat for breathing problems.  -     fluticasone-umeclidin-vilanter (TRELEGY ELLIPTA) 100-62.5-25 mcg DsDv; Inhale 1 puff into the lungs once daily.  -     albuterol (VENTOLIN HFA) 90 mcg/actuation inhaler; Inhale 2 puffs into the lungs every 4 (four) hours as needed for Wheezing or Shortness of Breath. Rescue    Chronic obstructive pulmonary disease, unspecified COPD type  -     predniSONE (DELTASONE) 20 MG tablet; Take 1 tablet (20 mg total) by mouth once daily. Take one daily for 5 days, repeat for breathing problems.  -     fluticasone-umeclidin-vilanter (TRELEGY ELLIPTA) 100-62.5-25 mcg DsDv; Inhale 1 puff into the lungs once daily.  -     albuterol (VENTOLIN HFA) 90 mcg/actuation inhaler; Inhale 2 puffs into the lungs every 4 (four) hours as needed for Wheezing or Shortness of Breath. Rescue          Follow up in about 6 months (around 5/11/2021), or if symptoms worsen or fail to improve.    Discussed with patient above for education the following:      Patient Instructions   Stop Breo and spiriva    Start Trelegy 100 1 puff once a day every day, rinse mouth after using due to risk for thrush if mouth or tongue has white sores contact clinic

## 2020-11-11 NOTE — PATIENT INSTRUCTIONS
Stop Breo and spiriva    Start Trelegy 100 1 puff once a day every day, rinse mouth after using due to risk for thrush if mouth or tongue has white sores contact clinic

## 2021-02-19 DIAGNOSIS — J44.89 COPD WITH ASTHMA: ICD-10-CM

## 2021-02-19 DIAGNOSIS — J44.9 CHRONIC OBSTRUCTIVE PULMONARY DISEASE, UNSPECIFIED COPD TYPE: Chronic | ICD-10-CM

## 2021-02-19 RX ORDER — ALBUTEROL SULFATE 90 UG/1
2 AEROSOL, METERED RESPIRATORY (INHALATION) EVERY 4 HOURS PRN
Qty: 18 G | Refills: 11 | Status: SHIPPED | OUTPATIENT
Start: 2021-02-19 | End: 2022-03-02 | Stop reason: SDUPTHER

## 2021-02-19 RX ORDER — AZITHROMYCIN 500 MG/1
500 TABLET, FILM COATED ORAL DAILY
Qty: 3 TABLET | Refills: 0 | Status: SHIPPED | OUTPATIENT
Start: 2021-02-19 | End: 2021-02-22

## 2021-03-24 ENCOUNTER — OFFICE VISIT (OUTPATIENT)
Dept: PAIN MEDICINE | Facility: CLINIC | Age: 66
End: 2021-03-24
Payer: COMMERCIAL

## 2021-03-24 VITALS
HEIGHT: 69 IN | DIASTOLIC BLOOD PRESSURE: 96 MMHG | HEART RATE: 76 BPM | BODY MASS INDEX: 23.4 KG/M2 | SYSTOLIC BLOOD PRESSURE: 154 MMHG | WEIGHT: 158 LBS

## 2021-03-24 DIAGNOSIS — M54.16 LUMBAR RADICULITIS: Primary | ICD-10-CM

## 2021-03-24 DIAGNOSIS — M51.36 DDD (DEGENERATIVE DISC DISEASE), LUMBAR: ICD-10-CM

## 2021-03-24 DIAGNOSIS — M48.062 SPINAL STENOSIS OF LUMBAR REGION WITH NEUROGENIC CLAUDICATION: ICD-10-CM

## 2021-03-24 DIAGNOSIS — M47.896 OTHER SPONDYLOSIS, LUMBAR REGION: ICD-10-CM

## 2021-03-24 PROCEDURE — 99204 OFFICE O/P NEW MOD 45 MIN: CPT | Mod: S$GLB,,, | Performed by: ANESTHESIOLOGY

## 2021-03-24 PROCEDURE — 99999 PR PBB SHADOW E&M-EST. PATIENT-LVL IV: CPT | Mod: PBBFAC,,, | Performed by: ANESTHESIOLOGY

## 2021-03-24 PROCEDURE — 99204 PR OFFICE/OUTPT VISIT, NEW, LEVL IV, 45-59 MIN: ICD-10-PCS | Mod: S$GLB,,, | Performed by: ANESTHESIOLOGY

## 2021-03-24 PROCEDURE — 1125F AMNT PAIN NOTED PAIN PRSNT: CPT | Mod: S$GLB,,, | Performed by: ANESTHESIOLOGY

## 2021-03-24 PROCEDURE — 1125F PR PAIN SEVERITY QUANTIFIED, PAIN PRESENT: ICD-10-PCS | Mod: S$GLB,,, | Performed by: ANESTHESIOLOGY

## 2021-03-24 PROCEDURE — 99999 PR PBB SHADOW E&M-EST. PATIENT-LVL IV: ICD-10-PCS | Mod: PBBFAC,,, | Performed by: ANESTHESIOLOGY

## 2021-03-24 RX ORDER — CLINDAMYCIN HYDROCHLORIDE 300 MG/1
CAPSULE ORAL
COMMUNITY
Start: 2021-01-09 | End: 2021-09-28 | Stop reason: ALTCHOICE

## 2021-03-24 RX ORDER — DIAZEPAM 5 MG/1
TABLET ORAL
COMMUNITY
Start: 2021-01-09 | End: 2021-09-28 | Stop reason: ALTCHOICE

## 2021-04-01 ENCOUNTER — PATIENT MESSAGE (OUTPATIENT)
Dept: SURGERY | Facility: AMBULARY SURGERY CENTER | Age: 66
End: 2021-04-01

## 2021-04-03 ENCOUNTER — LAB VISIT (OUTPATIENT)
Dept: PRIMARY CARE CLINIC | Facility: CLINIC | Age: 66
End: 2021-04-03
Payer: COMMERCIAL

## 2021-04-03 DIAGNOSIS — M54.16 LUMBAR RADICULITIS: ICD-10-CM

## 2021-04-03 PROCEDURE — U0003 INFECTIOUS AGENT DETECTION BY NUCLEIC ACID (DNA OR RNA); SEVERE ACUTE RESPIRATORY SYNDROME CORONAVIRUS 2 (SARS-COV-2) (CORONAVIRUS DISEASE [COVID-19]), AMPLIFIED PROBE TECHNIQUE, MAKING USE OF HIGH THROUGHPUT TECHNOLOGIES AS DESCRIBED BY CMS-2020-01-R: HCPCS | Performed by: ANESTHESIOLOGY

## 2021-04-03 PROCEDURE — U0005 INFEC AGEN DETEC AMPLI PROBE: HCPCS | Performed by: ANESTHESIOLOGY

## 2021-04-04 LAB — SARS-COV-2 RNA RESP QL NAA+PROBE: NOT DETECTED

## 2021-04-06 ENCOUNTER — HOSPITAL ENCOUNTER (OUTPATIENT)
Facility: AMBULARY SURGERY CENTER | Age: 66
Discharge: HOME | End: 2021-04-06
Attending: ANESTHESIOLOGY | Admitting: ANESTHESIOLOGY
Payer: COMMERCIAL

## 2021-04-06 VITALS
TEMPERATURE: 99 F | RESPIRATION RATE: 20 BRPM | WEIGHT: 158.06 LBS | DIASTOLIC BLOOD PRESSURE: 63 MMHG | BODY MASS INDEX: 23.34 KG/M2 | OXYGEN SATURATION: 93 % | SYSTOLIC BLOOD PRESSURE: 130 MMHG | HEART RATE: 79 BPM

## 2021-04-06 DIAGNOSIS — M54.16 LUMBAR RADICULITIS: Primary | ICD-10-CM

## 2021-04-06 PROCEDURE — 64483 NJX AA&/STRD TFRM EPI L/S 1: CPT | Mod: LT | Performed by: ANESTHESIOLOGY

## 2021-04-06 PROCEDURE — 64483 PR EPIDURAL INJ, ANES/STEROID, TRANSFORAMINAL, LUMB/SACR, SNGL LEVL: ICD-10-PCS | Mod: 50,,, | Performed by: ANESTHESIOLOGY

## 2021-04-06 PROCEDURE — 64483 NJX AA&/STRD TFRM EPI L/S 1: CPT | Mod: 50,,, | Performed by: ANESTHESIOLOGY

## 2021-04-06 RX ORDER — FENTANYL CITRATE 50 UG/ML
INJECTION, SOLUTION INTRAMUSCULAR; INTRAVENOUS
Status: DISCONTINUED | OUTPATIENT
Start: 2021-04-06 | End: 2021-04-06 | Stop reason: HOSPADM

## 2021-04-06 RX ORDER — MIDAZOLAM HYDROCHLORIDE 1 MG/ML
INJECTION INTRAMUSCULAR; INTRAVENOUS
Status: DISCONTINUED | OUTPATIENT
Start: 2021-04-06 | End: 2021-04-06 | Stop reason: HOSPADM

## 2021-04-06 RX ORDER — LIDOCAINE HYDROCHLORIDE 10 MG/ML
INJECTION, SOLUTION EPIDURAL; INFILTRATION; INTRACAUDAL; PERINEURAL
Status: DISCONTINUED | OUTPATIENT
Start: 2021-04-06 | End: 2021-04-06 | Stop reason: HOSPADM

## 2021-04-06 RX ORDER — SODIUM CHLORIDE, SODIUM LACTATE, POTASSIUM CHLORIDE, CALCIUM CHLORIDE 600; 310; 30; 20 MG/100ML; MG/100ML; MG/100ML; MG/100ML
INJECTION, SOLUTION INTRAVENOUS ONCE AS NEEDED
Status: COMPLETED | OUTPATIENT
Start: 2021-04-06 | End: 2021-04-06

## 2021-04-06 RX ORDER — DEXAMETHASONE SODIUM PHOSPHATE 10 MG/ML
INJECTION INTRAMUSCULAR; INTRAVENOUS
Status: DISCONTINUED | OUTPATIENT
Start: 2021-04-06 | End: 2021-04-06 | Stop reason: HOSPADM

## 2021-04-06 RX ORDER — BUPIVACAINE HYDROCHLORIDE 2.5 MG/ML
INJECTION, SOLUTION EPIDURAL; INFILTRATION; INTRACAUDAL
Status: DISCONTINUED | OUTPATIENT
Start: 2021-04-06 | End: 2021-04-06 | Stop reason: HOSPADM

## 2021-04-06 RX ADMIN — SODIUM CHLORIDE, SODIUM LACTATE, POTASSIUM CHLORIDE, CALCIUM CHLORIDE: 600; 310; 30; 20 INJECTION, SOLUTION INTRAVENOUS at 11:04

## 2021-05-10 ENCOUNTER — OFFICE VISIT (OUTPATIENT)
Dept: PAIN MEDICINE | Facility: CLINIC | Age: 66
End: 2021-05-10
Payer: COMMERCIAL

## 2021-05-10 VITALS
DIASTOLIC BLOOD PRESSURE: 86 MMHG | WEIGHT: 158 LBS | BODY MASS INDEX: 23.4 KG/M2 | HEART RATE: 92 BPM | HEIGHT: 69 IN | SYSTOLIC BLOOD PRESSURE: 137 MMHG

## 2021-05-10 DIAGNOSIS — M54.16 LUMBAR RADICULITIS: ICD-10-CM

## 2021-05-10 DIAGNOSIS — M47.896 OTHER SPONDYLOSIS, LUMBAR REGION: Primary | ICD-10-CM

## 2021-05-10 DIAGNOSIS — M51.36 DDD (DEGENERATIVE DISC DISEASE), LUMBAR: ICD-10-CM

## 2021-05-10 PROCEDURE — 1125F AMNT PAIN NOTED PAIN PRSNT: CPT | Mod: S$GLB,,, | Performed by: ANESTHESIOLOGY

## 2021-05-10 PROCEDURE — 99214 PR OFFICE/OUTPT VISIT, EST, LEVL IV, 30-39 MIN: ICD-10-PCS | Mod: S$GLB,,, | Performed by: ANESTHESIOLOGY

## 2021-05-10 PROCEDURE — 3008F PR BODY MASS INDEX (BMI) DOCUMENTED: ICD-10-PCS | Mod: S$GLB,,, | Performed by: ANESTHESIOLOGY

## 2021-05-10 PROCEDURE — 1125F PR PAIN SEVERITY QUANTIFIED, PAIN PRESENT: ICD-10-PCS | Mod: S$GLB,,, | Performed by: ANESTHESIOLOGY

## 2021-05-10 PROCEDURE — 3008F BODY MASS INDEX DOCD: CPT | Mod: S$GLB,,, | Performed by: ANESTHESIOLOGY

## 2021-05-10 PROCEDURE — 99999 PR PBB SHADOW E&M-EST. PATIENT-LVL III: CPT | Mod: PBBFAC,,, | Performed by: ANESTHESIOLOGY

## 2021-05-10 PROCEDURE — 99214 OFFICE O/P EST MOD 30 MIN: CPT | Mod: S$GLB,,, | Performed by: ANESTHESIOLOGY

## 2021-05-10 PROCEDURE — 99999 PR PBB SHADOW E&M-EST. PATIENT-LVL III: ICD-10-PCS | Mod: PBBFAC,,, | Performed by: ANESTHESIOLOGY

## 2021-05-24 ENCOUNTER — OFFICE VISIT (OUTPATIENT)
Dept: PULMONOLOGY | Facility: CLINIC | Age: 66
End: 2021-05-24
Payer: COMMERCIAL

## 2021-05-24 VITALS
WEIGHT: 161.94 LBS | HEART RATE: 74 BPM | HEIGHT: 69 IN | SYSTOLIC BLOOD PRESSURE: 154 MMHG | DIASTOLIC BLOOD PRESSURE: 87 MMHG | BODY MASS INDEX: 23.98 KG/M2 | OXYGEN SATURATION: 97 %

## 2021-05-24 DIAGNOSIS — Z72.0 TOBACCO ABUSE: ICD-10-CM

## 2021-05-24 DIAGNOSIS — J44.89 COPD WITH ASTHMA: Primary | ICD-10-CM

## 2021-05-24 PROCEDURE — 3008F PR BODY MASS INDEX (BMI) DOCUMENTED: ICD-10-PCS | Mod: S$GLB,,, | Performed by: NURSE PRACTITIONER

## 2021-05-24 PROCEDURE — 99213 PR OFFICE/OUTPT VISIT, EST, LEVL III, 20-29 MIN: ICD-10-PCS | Mod: S$GLB,,, | Performed by: NURSE PRACTITIONER

## 2021-05-24 PROCEDURE — 1125F PR PAIN SEVERITY QUANTIFIED, PAIN PRESENT: ICD-10-PCS | Mod: S$GLB,,, | Performed by: NURSE PRACTITIONER

## 2021-05-24 PROCEDURE — 1125F AMNT PAIN NOTED PAIN PRSNT: CPT | Mod: S$GLB,,, | Performed by: NURSE PRACTITIONER

## 2021-05-24 PROCEDURE — 99999 PR PBB SHADOW E&M-EST. PATIENT-LVL IV: CPT | Mod: PBBFAC,,, | Performed by: NURSE PRACTITIONER

## 2021-05-24 PROCEDURE — 3008F BODY MASS INDEX DOCD: CPT | Mod: S$GLB,,, | Performed by: NURSE PRACTITIONER

## 2021-05-24 PROCEDURE — 99213 OFFICE O/P EST LOW 20 MIN: CPT | Mod: S$GLB,,, | Performed by: NURSE PRACTITIONER

## 2021-05-24 PROCEDURE — 99999 PR PBB SHADOW E&M-EST. PATIENT-LVL IV: ICD-10-PCS | Mod: PBBFAC,,, | Performed by: NURSE PRACTITIONER

## 2021-05-24 RX ORDER — AZITHROMYCIN 500 MG/1
500 TABLET, FILM COATED ORAL DAILY
Qty: 3 TABLET | Refills: 0 | Status: SHIPPED | OUTPATIENT
Start: 2021-05-24 | End: 2021-05-27

## 2021-06-24 ENCOUNTER — HOSPITAL ENCOUNTER (OUTPATIENT)
Dept: RADIOLOGY | Facility: HOSPITAL | Age: 66
Discharge: HOME | End: 2021-06-24
Attending: NURSE PRACTITIONER
Payer: COMMERCIAL

## 2021-06-24 DIAGNOSIS — J44.89 COPD WITH ASTHMA: ICD-10-CM

## 2021-06-24 PROCEDURE — 71046 X-RAY EXAM CHEST 2 VIEWS: CPT | Mod: 26,,, | Performed by: RADIOLOGY

## 2021-06-24 PROCEDURE — 71046 X-RAY EXAM CHEST 2 VIEWS: CPT | Mod: TC,FY

## 2021-06-24 PROCEDURE — 71046 XR CHEST PA AND LATERAL: ICD-10-PCS | Mod: 26,,, | Performed by: RADIOLOGY

## 2021-06-28 ENCOUNTER — TELEPHONE (OUTPATIENT)
Dept: PULMONOLOGY | Facility: CLINIC | Age: 66
End: 2021-06-28

## 2021-06-28 ENCOUNTER — PATIENT MESSAGE (OUTPATIENT)
Dept: PULMONOLOGY | Facility: CLINIC | Age: 66
End: 2021-06-28

## 2021-07-01 NOTE — ADDENDUM NOTE
Addended by: ADAIR DE LA GARZA on: 2/14/2017 01:43 PM     Modules accepted: Orders     Quality 431: Preventive Care And Screening: Unhealthy Alcohol Use - Screening: Patient screened for unhealthy alcohol use using a single question and scores 2 or greater episodes per year and brief intervention occurred Quality 110: Preventive Care And Screening: Influenza Immunization: Influenza immunization was not ordered or administered, reason not given Quality 226: Preventive Care And Screening: Tobacco Use: Screening And Cessation Intervention: Patient screened for tobacco use and is an ex/non-smoker Detail Level: Detailed

## 2021-09-01 ENCOUNTER — PATIENT MESSAGE (OUTPATIENT)
Dept: PULMONOLOGY | Facility: CLINIC | Age: 66
End: 2021-09-01

## 2021-09-01 DIAGNOSIS — I10 ESSENTIAL HYPERTENSION: ICD-10-CM

## 2021-09-01 RX ORDER — LISINOPRIL AND HYDROCHLOROTHIAZIDE 10; 12.5 MG/1; MG/1
1 TABLET ORAL DAILY
Qty: 90 TABLET | Refills: 3 | Status: SHIPPED | OUTPATIENT
Start: 2021-09-01 | End: 2022-05-24

## 2021-09-28 ENCOUNTER — LAB VISIT (OUTPATIENT)
Dept: LAB | Facility: HOSPITAL | Age: 66
End: 2021-09-28
Attending: STUDENT IN AN ORGANIZED HEALTH CARE EDUCATION/TRAINING PROGRAM
Payer: MEDICARE

## 2021-09-28 ENCOUNTER — OFFICE VISIT (OUTPATIENT)
Dept: FAMILY MEDICINE | Facility: CLINIC | Age: 66
End: 2021-09-28
Payer: MEDICARE

## 2021-09-28 VITALS
DIASTOLIC BLOOD PRESSURE: 84 MMHG | WEIGHT: 166 LBS | SYSTOLIC BLOOD PRESSURE: 136 MMHG | RESPIRATION RATE: 18 BRPM | HEIGHT: 69 IN | OXYGEN SATURATION: 94 % | HEART RATE: 80 BPM | BODY MASS INDEX: 24.59 KG/M2

## 2021-09-28 DIAGNOSIS — Z00.00 ROUTINE GENERAL MEDICAL EXAMINATION AT A HEALTH CARE FACILITY: Primary | ICD-10-CM

## 2021-09-28 DIAGNOSIS — M54.32 BILATERAL SCIATICA: ICD-10-CM

## 2021-09-28 DIAGNOSIS — Z78.0 ASYMPTOMATIC MENOPAUSAL STATE: ICD-10-CM

## 2021-09-28 DIAGNOSIS — M54.31 BILATERAL SCIATICA: ICD-10-CM

## 2021-09-28 DIAGNOSIS — G62.9 PERIPHERAL POLYNEUROPATHY: ICD-10-CM

## 2021-09-28 DIAGNOSIS — R20.0 ANESTHESIA OF SKIN: ICD-10-CM

## 2021-09-28 DIAGNOSIS — F41.9 ANXIETY AND DEPRESSION: ICD-10-CM

## 2021-09-28 DIAGNOSIS — D75.89 MACROCYTOSIS: ICD-10-CM

## 2021-09-28 DIAGNOSIS — F32.A ANXIETY AND DEPRESSION: ICD-10-CM

## 2021-09-28 LAB
ALBUMIN SERPL BCP-MCNC: 4.2 G/DL (ref 3.5–5.2)
ALP SERPL-CCNC: 60 U/L (ref 55–135)
ALT SERPL W/O P-5'-P-CCNC: 16 U/L (ref 10–44)
ANION GAP SERPL CALC-SCNC: 17 MMOL/L (ref 8–16)
AST SERPL-CCNC: 22 U/L (ref 10–40)
BILIRUB SERPL-MCNC: 0.7 MG/DL (ref 0.1–1)
BUN SERPL-MCNC: 11 MG/DL (ref 8–23)
CALCIUM SERPL-MCNC: 9.8 MG/DL (ref 8.7–10.5)
CHLORIDE SERPL-SCNC: 94 MMOL/L (ref 95–110)
CO2 SERPL-SCNC: 25 MMOL/L (ref 23–29)
CREAT SERPL-MCNC: 0.8 MG/DL (ref 0.5–1.4)
EST. GFR  (AFRICAN AMERICAN): >60 ML/MIN/1.73 M^2
EST. GFR  (NON AFRICAN AMERICAN): >60 ML/MIN/1.73 M^2
FOLATE SERPL-MCNC: 19.2 NG/ML (ref 4–24)
GLUCOSE SERPL-MCNC: 110 MG/DL (ref 70–110)
POTASSIUM SERPL-SCNC: 3.9 MMOL/L (ref 3.5–5.1)
PROT SERPL-MCNC: 7.3 G/DL (ref 6–8.4)
SODIUM SERPL-SCNC: 136 MMOL/L (ref 136–145)
TSH SERPL DL<=0.005 MIU/L-ACNC: 1.19 UIU/ML (ref 0.4–4)
VIT B12 SERPL-MCNC: 822 PG/ML (ref 210–950)

## 2021-09-28 PROCEDURE — 82607 VITAMIN B-12: CPT | Performed by: STUDENT IN AN ORGANIZED HEALTH CARE EDUCATION/TRAINING PROGRAM

## 2021-09-28 PROCEDURE — 36415 COLL VENOUS BLD VENIPUNCTURE: CPT | Mod: PO | Performed by: STUDENT IN AN ORGANIZED HEALTH CARE EDUCATION/TRAINING PROGRAM

## 2021-09-28 PROCEDURE — 85025 COMPLETE CBC W/AUTO DIFF WBC: CPT | Performed by: STUDENT IN AN ORGANIZED HEALTH CARE EDUCATION/TRAINING PROGRAM

## 2021-09-28 PROCEDURE — 99999 PR PBB SHADOW E&M-EST. PATIENT-LVL V: ICD-10-PCS | Mod: PBBFAC,,, | Performed by: STUDENT IN AN ORGANIZED HEALTH CARE EDUCATION/TRAINING PROGRAM

## 2021-09-28 PROCEDURE — 84443 ASSAY THYROID STIM HORMONE: CPT | Performed by: STUDENT IN AN ORGANIZED HEALTH CARE EDUCATION/TRAINING PROGRAM

## 2021-09-28 PROCEDURE — 99214 PR OFFICE/OUTPT VISIT, EST, LEVL IV, 30-39 MIN: ICD-10-PCS | Mod: S$PBB,,, | Performed by: STUDENT IN AN ORGANIZED HEALTH CARE EDUCATION/TRAINING PROGRAM

## 2021-09-28 PROCEDURE — 99214 OFFICE O/P EST MOD 30 MIN: CPT | Mod: S$PBB,,, | Performed by: STUDENT IN AN ORGANIZED HEALTH CARE EDUCATION/TRAINING PROGRAM

## 2021-09-28 PROCEDURE — 82746 ASSAY OF FOLIC ACID SERUM: CPT | Performed by: STUDENT IN AN ORGANIZED HEALTH CARE EDUCATION/TRAINING PROGRAM

## 2021-09-28 PROCEDURE — 80053 COMPREHEN METABOLIC PANEL: CPT | Performed by: STUDENT IN AN ORGANIZED HEALTH CARE EDUCATION/TRAINING PROGRAM

## 2021-09-28 PROCEDURE — 99999 PR PBB SHADOW E&M-EST. PATIENT-LVL V: CPT | Mod: PBBFAC,,, | Performed by: STUDENT IN AN ORGANIZED HEALTH CARE EDUCATION/TRAINING PROGRAM

## 2021-09-28 RX ORDER — GABAPENTIN 300 MG/1
300 CAPSULE ORAL NIGHTLY
Start: 2021-09-28 | End: 2021-11-01 | Stop reason: SDUPTHER

## 2021-09-29 LAB
BASOPHILS # BLD AUTO: 0.05 K/UL (ref 0–0.2)
BASOPHILS NFR BLD: 0.5 % (ref 0–1.9)
DIFFERENTIAL METHOD: ABNORMAL
EOSINOPHIL # BLD AUTO: 0.2 K/UL (ref 0–0.5)
EOSINOPHIL NFR BLD: 1.7 % (ref 0–8)
ERYTHROCYTE [DISTWIDTH] IN BLOOD BY AUTOMATED COUNT: 12.4 % (ref 11.5–14.5)
HCT VFR BLD AUTO: 42.3 % (ref 37–48.5)
HGB BLD-MCNC: 13.9 G/DL (ref 12–16)
IMM GRANULOCYTES # BLD AUTO: 0.03 K/UL (ref 0–0.04)
IMM GRANULOCYTES NFR BLD AUTO: 0.3 % (ref 0–0.5)
LYMPHOCYTES # BLD AUTO: 1.4 K/UL (ref 1–4.8)
LYMPHOCYTES NFR BLD: 13.8 % (ref 18–48)
MCH RBC QN AUTO: 32.2 PG (ref 27–31)
MCHC RBC AUTO-ENTMCNC: 32.9 G/DL (ref 32–36)
MCV RBC AUTO: 98 FL (ref 82–98)
MONOCYTES # BLD AUTO: 1 K/UL (ref 0.3–1)
MONOCYTES NFR BLD: 9.7 % (ref 4–15)
NEUTROPHILS # BLD AUTO: 7.4 K/UL (ref 1.8–7.7)
NEUTROPHILS NFR BLD: 74 % (ref 38–73)
NRBC BLD-RTO: 0 /100 WBC
PLATELET # BLD AUTO: 266 K/UL (ref 150–450)
PMV BLD AUTO: 10.7 FL (ref 9.2–12.9)
RBC # BLD AUTO: 4.32 M/UL (ref 4–5.4)
WBC # BLD AUTO: 10.01 K/UL (ref 3.9–12.7)

## 2021-10-05 ENCOUNTER — HOSPITAL ENCOUNTER (OUTPATIENT)
Dept: RADIOLOGY | Facility: CLINIC | Age: 66
Discharge: HOME | End: 2021-10-05
Attending: STUDENT IN AN ORGANIZED HEALTH CARE EDUCATION/TRAINING PROGRAM
Payer: MEDICARE

## 2021-10-05 DIAGNOSIS — Z78.0 ASYMPTOMATIC MENOPAUSAL STATE: ICD-10-CM

## 2021-10-05 PROCEDURE — 77080 DXA BONE DENSITY AXIAL: CPT | Mod: 26,,, | Performed by: RADIOLOGY

## 2021-10-05 PROCEDURE — 77080 DEXA BONE DENSITY SPINE HIP: ICD-10-PCS | Mod: 26,,, | Performed by: RADIOLOGY

## 2021-10-05 PROCEDURE — 77081 DXA BONE DENSITY APPENDICULR: CPT | Mod: 26,59,, | Performed by: RADIOLOGY

## 2021-10-05 PROCEDURE — 77081 DXA BONE DENSITY APPENDICULR: CPT | Mod: TC,PO

## 2021-10-05 PROCEDURE — 77081 DEXA BONE DENSITY APPENDICULAR SKELETON: ICD-10-PCS | Mod: 26,59,, | Performed by: RADIOLOGY

## 2021-10-05 PROCEDURE — 77080 DXA BONE DENSITY AXIAL: CPT | Mod: TC,PO

## 2021-10-28 ENCOUNTER — PATIENT MESSAGE (OUTPATIENT)
Dept: PULMONOLOGY | Facility: CLINIC | Age: 66
End: 2021-10-28
Payer: MEDICARE

## 2021-10-28 ENCOUNTER — PATIENT MESSAGE (OUTPATIENT)
Dept: FAMILY MEDICINE | Facility: CLINIC | Age: 66
End: 2021-10-28
Payer: MEDICARE

## 2021-10-31 ENCOUNTER — PATIENT MESSAGE (OUTPATIENT)
Dept: FAMILY MEDICINE | Facility: CLINIC | Age: 66
End: 2021-10-31
Payer: MEDICARE

## 2021-10-31 DIAGNOSIS — M54.32 BILATERAL SCIATICA: ICD-10-CM

## 2021-10-31 DIAGNOSIS — M54.31 BILATERAL SCIATICA: ICD-10-CM

## 2021-11-01 ENCOUNTER — TELEPHONE (OUTPATIENT)
Dept: FAMILY MEDICINE | Facility: CLINIC | Age: 66
End: 2021-11-01
Payer: MEDICARE

## 2021-11-01 RX ORDER — GABAPENTIN 300 MG/1
300 CAPSULE ORAL NIGHTLY
Qty: 90 CAPSULE | Refills: 2 | Status: SHIPPED | OUTPATIENT
Start: 2021-11-01 | End: 2022-05-23 | Stop reason: SDUPTHER

## 2021-11-01 RX ORDER — GABAPENTIN 300 MG/1
300 CAPSULE ORAL NIGHTLY
Qty: 90 CAPSULE | Refills: 2
Start: 2021-11-01 | End: 2021-11-01

## 2021-11-07 ENCOUNTER — PATIENT OUTREACH (OUTPATIENT)
Dept: ADMINISTRATIVE | Facility: OTHER | Age: 66
End: 2021-11-07
Payer: MEDICARE

## 2021-11-08 ENCOUNTER — OFFICE VISIT (OUTPATIENT)
Dept: PULMONOLOGY | Facility: CLINIC | Age: 66
End: 2021-11-08
Payer: MEDICARE

## 2021-11-08 VITALS
OXYGEN SATURATION: 98 % | WEIGHT: 168.13 LBS | TEMPERATURE: 99 F | HEART RATE: 73 BPM | DIASTOLIC BLOOD PRESSURE: 81 MMHG | SYSTOLIC BLOOD PRESSURE: 128 MMHG | HEIGHT: 69 IN | BODY MASS INDEX: 24.9 KG/M2

## 2021-11-08 DIAGNOSIS — J44.9 CHRONIC OBSTRUCTIVE PULMONARY DISEASE, UNSPECIFIED COPD TYPE: Chronic | ICD-10-CM

## 2021-11-08 DIAGNOSIS — J44.89 COPD WITH ASTHMA: ICD-10-CM

## 2021-11-08 PROCEDURE — 99213 OFFICE O/P EST LOW 20 MIN: CPT | Mod: S$GLB,,, | Performed by: NURSE PRACTITIONER

## 2021-11-08 PROCEDURE — 99213 PR OFFICE/OUTPT VISIT, EST, LEVL III, 20-29 MIN: ICD-10-PCS | Mod: S$GLB,,, | Performed by: NURSE PRACTITIONER

## 2021-11-08 PROCEDURE — 99999 PR PBB SHADOW E&M-EST. PATIENT-LVL IV: ICD-10-PCS | Mod: PBBFAC,,, | Performed by: NURSE PRACTITIONER

## 2021-11-08 PROCEDURE — 99999 PR PBB SHADOW E&M-EST. PATIENT-LVL IV: CPT | Mod: PBBFAC,,, | Performed by: NURSE PRACTITIONER

## 2021-12-15 NOTE — PATIENT INSTRUCTIONS
Continue current COPD/Asthma therapy    Singulair not beneficial ok to discontinue    Asthma Action plan    Azithromycin 500 mg 1 pill for three days for yellow or green mucous    Prednisone 20 mg pills, Take one pill a day for three days, repeat for shortness of breath or wheeze    Albuterol Inhaler 1-2 puffs every 4 hours, for cough or shortness of breath     Gideon Shone is a 62year old female who presents for a complete physical exam.     had concerns including Establish Care (new patient ) and ER F/U (around thanksgiving, passed out, reason was the the smells of the perfumes at [facility] got her dizzy ). been smoking cigarettes. She started smoking about 43 years ago. She has a 20.50 pack-year smoking history. She has never used smokeless tobacco. She reports that she does not drink alcohol and does not use drugs.     Exercise: minimal.  Diet: watches minim 09:47 PM    AST 24 11/16/2021 09:47 PM    ALT 27 11/16/2021 09:47 PM    BILT 0.6 11/16/2021 09:47 PM    TSH 0.697 10/19/2019 10:02 AM        Lab Results   Component Value Date/Time    CHOLEST 188 10/19/2019 10:02 AM    HDL 67 (H) 10/19/2019 10:02 AM    TRI General: Normal range of motion. Cervical back: Normal range of motion and neck supple. Right lower leg: No edema. Left lower leg: No edema. Skin:     General: Skin is warm and dry.       Capillary Refill: Capillary refill takes less t ACELLULAR PERTUSIS VACCINE (TDAP), >7 YEARS, IM USE  -     PNEUMOCOCCAL IMM (PNEUMOVAX)  7.  Cigarette smoker  Overview:  20 + PPD x 40 years  Assessment & Plan:  Arrange CT lung screening, encouraged her to quit and counseling given  Orders:  -     BEHAV C

## 2022-02-01 ENCOUNTER — PATIENT OUTREACH (OUTPATIENT)
Dept: ADMINISTRATIVE | Facility: OTHER | Age: 67
End: 2022-02-01
Payer: MEDICARE

## 2022-02-01 DIAGNOSIS — Z12.31 ENCOUNTER FOR SCREENING MAMMOGRAM FOR MALIGNANT NEOPLASM OF BREAST: Primary | ICD-10-CM

## 2022-03-02 ENCOUNTER — PATIENT MESSAGE (OUTPATIENT)
Dept: PULMONOLOGY | Facility: CLINIC | Age: 67
End: 2022-03-02
Payer: MEDICARE

## 2022-03-23 ENCOUNTER — PATIENT MESSAGE (OUTPATIENT)
Dept: ADMINISTRATIVE | Facility: HOSPITAL | Age: 67
End: 2022-03-23
Payer: MEDICARE

## 2022-03-23 ENCOUNTER — PATIENT OUTREACH (OUTPATIENT)
Dept: ADMINISTRATIVE | Facility: HOSPITAL | Age: 67
End: 2022-03-23
Payer: MEDICARE

## 2022-03-23 NOTE — LETTER
March 30, 2022    Abbey Hagan  [address]  [place] [address]             [facility]  [address]  [facility] [address]  Phone: [phone] Dear Ms. Hagan,    Good morning!!      I am reaching out to you because you are over due for your Mammogram. Have you had one in the last year, if so where did you have it done? If not, would you like me to schedule a Mammogram for you?     Thanks so much!     Tory MEZA LPN [facility]  [facility]  [address]   [place] [address]  P- [phone]  F- [phone]

## 2022-03-30 ENCOUNTER — OFFICE VISIT (OUTPATIENT)
Dept: FAMILY MEDICINE | Facility: CLINIC | Age: 67
End: 2022-03-30
Payer: MEDICARE

## 2022-03-30 ENCOUNTER — HOSPITAL ENCOUNTER (OUTPATIENT)
Dept: RADIOLOGY | Facility: CLINIC | Age: 67
Discharge: HOME | End: 2022-03-30
Attending: STUDENT IN AN ORGANIZED HEALTH CARE EDUCATION/TRAINING PROGRAM
Payer: MEDICARE

## 2022-03-30 VITALS
HEIGHT: 69 IN | DIASTOLIC BLOOD PRESSURE: 84 MMHG | SYSTOLIC BLOOD PRESSURE: 126 MMHG | WEIGHT: 168.19 LBS | OXYGEN SATURATION: 96 % | HEART RATE: 82 BPM | BODY MASS INDEX: 24.91 KG/M2 | RESPIRATION RATE: 18 BRPM

## 2022-03-30 DIAGNOSIS — L98.9 SKIN LESION: ICD-10-CM

## 2022-03-30 DIAGNOSIS — M54.2 CERVICALGIA: ICD-10-CM

## 2022-03-30 DIAGNOSIS — Z00.00 ROUTINE GENERAL MEDICAL EXAMINATION AT A HEALTH CARE FACILITY: Primary | ICD-10-CM

## 2022-03-30 PROCEDURE — 99397 PER PM REEVAL EST PAT 65+ YR: CPT | Mod: S$GLB,,, | Performed by: STUDENT IN AN ORGANIZED HEALTH CARE EDUCATION/TRAINING PROGRAM

## 2022-03-30 PROCEDURE — 72050 XR CERVICAL SPINE COMPLETE 5 VIEW: ICD-10-PCS | Mod: 26,,, | Performed by: RADIOLOGY

## 2022-03-30 PROCEDURE — 72050 X-RAY EXAM NECK SPINE 4/5VWS: CPT | Mod: 26,,, | Performed by: RADIOLOGY

## 2022-03-30 PROCEDURE — 72050 X-RAY EXAM NECK SPINE 4/5VWS: CPT | Mod: TC,FY,PO

## 2022-03-30 PROCEDURE — 99397 PR PREVENTIVE VISIT,EST,65 & OVER: ICD-10-PCS | Mod: S$GLB,,, | Performed by: STUDENT IN AN ORGANIZED HEALTH CARE EDUCATION/TRAINING PROGRAM

## 2022-03-30 PROCEDURE — 99999 PR PBB SHADOW E&M-EST. PATIENT-LVL V: ICD-10-PCS | Mod: PBBFAC,,, | Performed by: STUDENT IN AN ORGANIZED HEALTH CARE EDUCATION/TRAINING PROGRAM

## 2022-03-30 PROCEDURE — 99999 PR PBB SHADOW E&M-EST. PATIENT-LVL V: CPT | Mod: PBBFAC,,, | Performed by: STUDENT IN AN ORGANIZED HEALTH CARE EDUCATION/TRAINING PROGRAM

## 2022-03-30 NOTE — PATIENT INSTRUCTIONS
Take fiber supplement daily. I recommend benefiber or generic (green top) equivalent. This is colorless, tasteless and dissolves well in liquid.     Drink plenty of water.     If having constipation on fiber, add senna and docusate twice daily (available over the counter). This is safe to take every day if needed.   If still having problems add Miralax or Magnesium Citrate.     If not improved, you will need a prescription medicine.               Jose Potter,     If you are due for any health screening(s) below please notify me so we can arrange them to be ordered and scheduled to maintain your health. Most healthy patients complete it.     Health Maintenance   Topic Date Due    LDCT Lung Screen  10/29/2014    Mammogram  08/25/2017    DEXA Scan  10/05/2024    Lipid Panel  09/01/2025    TETANUS VACCINE  08/25/2026    Hepatitis C Screening  Completed       Breast Cancer Screening    Breast cancer is the second most common cancer in women after skin cancer, and the second leading cause of death from cancer after lung cancer. Mammograms can detect breast cancer early, which significantly increases the chances of curing the cancer.      A screening mammogram is an x-ray image of the breasts used for early breast cancer detection. It can help reduce the number of deaths from breast cancer among women. To get a clear image, the breast is placed between two plastic plates to make it flat. How often a mammogram is needed depends on your age and your breast cancer risk.    Although you are still overdue for this important screening, due to the COVID-19 pandemic, we recommend scheduling it in the near future.

## 2022-03-30 NOTE — PROGRESS NOTES
"[facility] MEDICINE CLINIC NOTE    Patient Name: Abbey Hagan  YOB: 1955    PRESENTING HISTORY   Chief Complaint:   Chief Complaint   Patient presents with    Annual Exam        History of Present Illness:  Ms. Abbey Hagan is a 66 y.o. female here for annual.      Skin lesion right nose- sen by derm in past, diagnosed with some kind of skin cancer. Needs recheck. Has had irregularity.     Chronic bowel problems- severe pain. Difficulty defecating.    Using yogurt, probiotics.      Pain in bilateral wrists.   Numbness middle finger of right hand    Urine is "weird"  Difficulty initiating. No sig incontinence      We spent much of today's visit discussing various medical providers she does not like both in this office and with other specialties. She is not willing to see many of these people again.       Reports chronic alcohol abuse. She quit drinking completely Ruslan this year because she recognized it was a problem. This likely explains her neuropathy.     She is very upset that an allergy to opiates is listed (in comments, urine positive for THC)   She is prescribed medical marijuana now and wants this removed from her chart.       She declines mammogram. Can't tolerate CT scan of her lungs.       Discussed that her finger symptoms are likely coming from her neck. She refuses to see Back and Spine practitioner due to previous experience.                                          Review of Systems   All other systems reviewed and are negative.        PAST HISTORY:     Past Medical History:   Diagnosis Date    Anxiety     Arthritis     back, neck and knees    Depression     Emphysema of lung     HSV infection     Hypertension        Past Surgical History:   Procedure Laterality Date    COLONOSCOPY N/A 10/26/2020    Procedure: COLONOSCOPY;  Surgeon: Gerard Hernandez MD;  Location[facility];  Service: Endoscopy;  Laterality: N/A;    HIP ARTHROPLASTY      bilat    JOINT REPLACEMENT "      bilat hip    TRANSFORAMINAL EPIDURAL INJECTION OF STEROID Bilateral 2021    Procedure: Injection,steroid,epidural,transforaminal approach;  Surgeon: Scott Barger MD;  [facility];  Service: Pain Management;  Laterality: Bilateral;  L4-5       Family History   Problem Relation Age of Onset    COPD Mother     Hypertension Mother     Heart disease Mother     Alcohol abuse Father     Depression Sister     Hypertension Sister     Colon cancer Maternal Grandmother     Breast cancer Paternal Aunt     Ovarian cancer Neg Hx     Psoriasis Neg Hx     Lupus Neg Hx     Eczema Neg Hx     Melanoma Neg Hx        Social History     Socioeconomic History    Marital status: Single   Occupational History     Employer: [employer]   Tobacco Use    Smoking status: Former Smoker     Packs/day: 0.50     Years: 40.00     Pack years: 20.00     Types: Cigarettes     Quit date: 2021     Years since quittin.5    Smokeless tobacco: Never Used    Tobacco comment: Patient down to 8 cigs a day   Substance and Sexual Activity    Alcohol use: Yes     Comment: socially    Drug use: Yes     Types: Oxycodone, Marijuana    Sexual activity: Not Currently       MEDICATIONS & ALLERGIES:     Current Outpatient Medications on File Prior to Visit   Medication Sig    acetaminophen (TYLENOL 8 HOUR ORAL) Take by mouth.    albuterol (VENTOLIN HFA) 90 mcg/actuation inhaler Inhale 2 puffs into the lungs every 4 (four) hours as needed for Wheezing or Shortness of Breath. Rescue    ascorbic acid, vitamin C, (VITAMIN C) 100 MG tablet Take 100 mg by mouth once daily.    CHOLECALCIFEROL, VITAMIN D3, (VITAMIN D-3 ORAL) No Sig Provided    EScitalopram oxalate (LEXAPRO) 20 MG tablet Take 1 tablet (20 mg total) by mouth once daily.    fluticasone-umeclidin-vilanter (TRELEGY ELLIPTA) 100-62.5-25 mcg DsDv Inhale 1 puff into the lungs once daily.    gabapentin (NEURONTIN) 300 MG capsule Take 1 capsule (300 mg total) by mouth  "every evening.    lisinopriL-hydrochlorothiazide (PRINZIDE,ZESTORETIC) 10-12.5 mg per tablet Take 1 tablet by mouth once daily.    multivitamin capsule Take 1 capsule by mouth once daily.    POTASSIUM/MAGNESIUM (MAGNESIUM-POTASSIUM ORAL) Take 1 tablet by mouth once daily. Every day    predniSONE (DELTASONE) 20 MG tablet Take 1 tablet (20 mg total) by mouth once daily. Take one daily for 5 days, repeat for breathing problems.    vitamin B complex (B COMPLEX 1 ORAL) Take by mouth.    albuterol-ipratropium (DUO-NEB) 2.5 mg-0.5 mg/3 mL nebulizer solution Take 3 mLs by nebulization every 6 (six) hours as needed for Wheezing or Shortness of Breath. Rescue    biotin 1 mg Cap Take by mouth.     No current facility-administered medications on file prior to visit.       Review of patient's allergies indicates:   Allergen Reactions    Opioids-meperidine and related      Urine toxicology positive for THC    Penicillin g      Childhood allergy--unknown reaction    Sulfa (sulfonamide antibiotics)      Childhood allergy--unknown reaction       OBJECTIVE:   Vital Signs:  Vitals:    03/30/22 1504   BP: 126/84   Pulse: 82   Resp: 18   SpO2: 96%   Weight: 76.3 kg (168 lb 3.4 oz)   Height: 5' 9" (1.753 m)       No results found for this or any previous visit (from the past 24 hour(s)).      Physical Exam  Vitals and nursing note reviewed.   Constitutional:       General: She is not in acute distress.     Appearance: She is not toxic-appearing or diaphoretic.   HENT:      Head: Normocephalic and atraumatic.      Right Ear: External ear normal.      Left Ear: External ear normal.   Eyes:      General: No scleral icterus.     Conjunctiva/sclera: Conjunctivae normal.      Pupils: Pupils are equal, round, and reactive to light.   Neck:      Thyroid: No thyromegaly.      Vascular: No carotid bruit.      Comments: Limited ROM of neck. No reproduction of radiculopathy.   No midline or paraspinal tenderness  Cardiovascular:      Rate " and Rhythm: Normal rate and regular rhythm.      Heart sounds: Normal heart sounds. No murmur heard.  Pulmonary:      Effort: Pulmonary effort is normal. No respiratory distress.      Breath sounds: Normal breath sounds. No wheezing or rales.   Musculoskeletal:         General: No tenderness or deformity. Normal range of motion.      Cervical back: Neck supple.      Right lower leg: No edema.      Left lower leg: No edema.   Lymphadenopathy:      Cervical: No cervical adenopathy.   Skin:     General: Skin is warm and dry.      Findings: No erythema or rash.      Comments: Hyperkeratotic lesion to right nare.    Neurological:      Mental Status: She is alert and oriented to person, place, and time.      Gait: Gait is intact.   Psychiatric:         Mood and Affect: Mood and affect normal.         Cognition and Memory: Memory normal.         Judgment: Judgment normal.         ASSESSMENT & PLAN:     Routine general medical examination at a health care facility  -     Comprehensive Metabolic Panel; Future; Expected date: 03/30/2022    Skin lesion  -     Cancel: Ambulatory referral/consult to Dermatology; Future; Expected date: 04/06/2022  -     Ambulatory referral/consult to Dermatology; Future; Expected date: 04/06/2022    Cervicalgia  -     X-Ray Cervical Spine Complete 5 view; Future; Expected date: 03/30/2022             Roby Noonan MD   Internal Medicine    This note was created using Dragon voice recognition software that occasionally misinterprets phrases or words.

## 2022-03-30 NOTE — TELEPHONE ENCOUNTER
"Attempted to outreach patient for pre-visit via "[employer]", no answer after a week. Sending outreach via Mail Out Letter now.  "

## 2022-05-16 ENCOUNTER — PATIENT OUTREACH (OUTPATIENT)
Dept: ADMINISTRATIVE | Facility: HOSPITAL | Age: 67
End: 2022-05-16
Payer: MEDICARE

## 2022-05-16 ENCOUNTER — PATIENT MESSAGE (OUTPATIENT)
Dept: ADMINISTRATIVE | Facility: HOSPITAL | Age: 67
End: 2022-05-16
Payer: MEDICARE

## 2022-05-16 NOTE — LETTER
May 23, 2022    Abbey Hagan  [address]  [place] [address]             [facility]  [address]  [facility] [address]  Phone: [phone] Dear Ms. Hagan,    Good evening!!      I am reaching out to you because you are over due for your Mammogram. Have you had one in the last year, if so where did you have it done? If not, would you like me to schedule a Mammogram for you?     Thanks so much!     Tory MEZA LPN [facility]  [facility]  [address] [place]  [place] [address]  P- [phone]  - [phone]

## 2022-05-23 ENCOUNTER — PATIENT MESSAGE (OUTPATIENT)
Dept: ADMINISTRATIVE | Facility: HOSPITAL | Age: 67
End: 2022-05-23
Payer: MEDICARE

## 2022-05-23 DIAGNOSIS — M54.32 BILATERAL SCIATICA: ICD-10-CM

## 2022-05-23 DIAGNOSIS — M54.31 BILATERAL SCIATICA: ICD-10-CM

## 2022-05-23 NOTE — TELEPHONE ENCOUNTER
"Attempted to outreach patient for pre-visit via "[employer]", no answer after a week. Sending outreach via Mail Out Letter now.  "

## 2022-05-23 NOTE — TELEPHONE ENCOUNTER
No new care gaps identified.  [facility] Embedded Care Gaps. Reference number: [id number]. 5/23/2022   1:35:45 PM CDT

## 2022-05-24 RX ORDER — GABAPENTIN 300 MG/1
600 CAPSULE ORAL NIGHTLY
Qty: 180 CAPSULE | Refills: 2 | Status: SHIPPED | OUTPATIENT
Start: 2022-05-24

## 2022-09-06 NOTE — NURSING
Pt discharged. AVS reviewed and Pt verbalized understanding. Tele and IV removed. Pt escorted to exit via wheelchair. No acute distress noted.    No

## 2022-10-11 DIAGNOSIS — F41.9 ANXIETY AND DEPRESSION: ICD-10-CM

## 2022-10-11 DIAGNOSIS — F32.A ANXIETY AND DEPRESSION: ICD-10-CM

## 2022-10-11 RX ORDER — ESCITALOPRAM OXALATE 20 MG/1
TABLET ORAL
Qty: 90 TABLET | Refills: 1 | Status: SHIPPED | OUTPATIENT
Start: 2022-10-11 | End: 2023-04-09

## 2022-10-11 NOTE — TELEPHONE ENCOUNTER
Refill Decision Note   Abbey Bentley  is requesting a refill authorization.  Brief Assessment and Rationale for Refill:        Medication Therapy Plan:       Medication Reconciliation Completed: No   Comments:     No Care Gaps recommended.     Note composed:12:32 PM 10/11/2022

## 2022-10-11 NOTE — TELEPHONE ENCOUNTER
No new care gaps identified.  [facility] Embedded Care Gaps. Reference number: [id number]. 10/11/2022   5:48:12 AM CDT

## 2022-11-21 ENCOUNTER — PATIENT MESSAGE (OUTPATIENT)
Dept: ADMINISTRATIVE | Facility: HOSPITAL | Age: 67
End: 2022-11-21
Payer: MEDICARE

## 2022-12-06 ENCOUNTER — PATIENT MESSAGE (OUTPATIENT)
Dept: ADMINISTRATIVE | Facility: HOSPITAL | Age: 67
End: 2022-12-06
Payer: MEDICARE

## 2023-01-12 DIAGNOSIS — M79.605 PAIN OF LEFT LEG: ICD-10-CM

## 2023-01-12 DIAGNOSIS — M79.604 PAIN OF RIGHT LEG: Primary | ICD-10-CM

## 2023-01-17 ENCOUNTER — HOSPITAL ENCOUNTER (OUTPATIENT)
Dept: RADIOLOGY | Facility: HOSPITAL | Age: 68
Discharge: HOME | End: 2023-01-17
Attending: INTERNAL MEDICINE
Payer: MEDICARE

## 2023-01-17 DIAGNOSIS — M79.605 PAIN OF LEFT LEG: ICD-10-CM

## 2023-01-17 DIAGNOSIS — M79.604 PAIN OF RIGHT LEG: ICD-10-CM

## 2023-01-17 PROCEDURE — 93925 LOWER EXTREMITY STUDY: CPT | Mod: TC

## 2023-03-16 ENCOUNTER — PATIENT MESSAGE (OUTPATIENT)
Dept: ADMINISTRATIVE | Facility: HOSPITAL | Age: 68
End: 2023-03-16
Payer: MEDICARE

## 2023-04-09 DIAGNOSIS — F32.A ANXIETY AND DEPRESSION: ICD-10-CM

## 2023-04-09 DIAGNOSIS — F41.9 ANXIETY AND DEPRESSION: ICD-10-CM

## 2023-04-09 RX ORDER — ESCITALOPRAM OXALATE 20 MG/1
TABLET ORAL
Qty: 90 TABLET | Refills: 0 | Status: SHIPPED | OUTPATIENT
Start: 2023-04-09

## 2023-04-09 NOTE — TELEPHONE ENCOUNTER
Refill Decision Note   Abbey Hagan  is requesting a refill authorization.  Brief Assessment and Rationale for Refill:  Approve     Medication Therapy Plan:       Medication Reconciliation Completed: No   Comments:     Provider Staff:     Action is required for this patient.   Please see care gap opportunities below in Care Due Message.     Thanks!  [facility]     Appointments      Date Provider   Last Visit   3/30/2022 Roby Noonan MD   Next Visit   Visit date not found Roby Noonan MD     Note composed:5:03 PM 04/09/2023           Note composed:5:03 PM 04/09/2023

## 2023-05-16 ENCOUNTER — PATIENT MESSAGE (OUTPATIENT)
Dept: ADMINISTRATIVE | Facility: HOSPITAL | Age: 68
End: 2023-05-16
Payer: MEDICARE

## 2023-05-19 DIAGNOSIS — Z12.31 ENCOUNTER FOR SCREENING MAMMOGRAM FOR MALIGNANT NEOPLASM OF BREAST: Primary | ICD-10-CM

## 2023-05-24 DIAGNOSIS — I73.9 PERIPHERAL ARTERIAL DISEASE: Primary | ICD-10-CM

## 2023-06-01 DIAGNOSIS — I73.9 PERIPHERAL VASCULAR DISEASE, UNSPECIFIED: Primary | ICD-10-CM

## 2023-06-02 ENCOUNTER — HOSPITAL ENCOUNTER (OUTPATIENT)
Dept: RADIOLOGY | Facility: HOSPITAL | Age: 68
Discharge: HOME | End: 2023-06-02
Attending: INTERNAL MEDICINE
Payer: MEDICARE

## 2023-06-02 DIAGNOSIS — I73.9 PERIPHERAL VASCULAR DISEASE, UNSPECIFIED: ICD-10-CM

## 2023-06-02 DIAGNOSIS — Z12.31 ENCOUNTER FOR SCREENING MAMMOGRAM FOR MALIGNANT NEOPLASM OF BREAST: ICD-10-CM

## 2023-06-02 PROCEDURE — 77067 SCR MAMMO BI INCL CAD: CPT | Mod: TC,PO

## 2023-06-12 ENCOUNTER — HOSPITAL ENCOUNTER (OUTPATIENT)
Dept: RADIOLOGY | Facility: HOSPITAL | Age: 68
Discharge: HOME | End: 2023-06-12
Attending: INTERNAL MEDICINE
Payer: MEDICARE

## 2023-06-12 LAB
CREAT SERPL-MCNC: 0.7 MG/DL (ref 0.5–1.4)
SAMPLE: NORMAL

## 2023-06-12 PROCEDURE — 82565 ASSAY OF CREATININE: CPT | Mod: PO

## 2023-06-12 PROCEDURE — 25500020 PHARM REV CODE 255: Mod: PO | Performed by: INTERNAL MEDICINE

## 2023-06-12 PROCEDURE — 75635 CT ANGIO ABDOMINAL ARTERIES: CPT | Mod: TC,PO

## 2023-06-12 RX ADMIN — IOHEXOL 120 ML: 350 INJECTION, SOLUTION INTRAVENOUS at 01:06

## 2023-09-11 NOTE — TELEPHONE ENCOUNTER
Care Due:                  Date            Visit Type   Department     Provider  --------------------------------------------------------------------------------                                EP -                              PRIMARY      [facility]    Roby Najera  Last Visit: 03-      CARE ([facility])   [facility]  Next Visit: None Scheduled  None         None Found                                                            Last  Test          Frequency    Reason                     Performed    Due Date  --------------------------------------------------------------------------------    Office Visit  12 months..  EScitalopram.............  03- 03-    [facility] Embedded Care Gaps. Reference number: [id number]. 4/09/2023   5:45:44 AM CDT   Nostril Rim Text: The closure involved the nostril rim.

## 2023-10-06 DIAGNOSIS — F17.210 CIGARETTE NICOTINE DEPENDENCE WITHOUT COMPLICATION: Primary | ICD-10-CM

## 2023-10-13 DIAGNOSIS — Z78.0 ASYMPTOMATIC MENOPAUSAL STATE: Primary | ICD-10-CM

## 2023-10-17 ENCOUNTER — HOSPITAL ENCOUNTER (OUTPATIENT)
Dept: RADIOLOGY | Facility: HOSPITAL | Age: 68
Discharge: HOME | End: 2023-10-17
Attending: INTERNAL MEDICINE
Payer: MEDICARE

## 2023-10-17 DIAGNOSIS — Z78.0 ASYMPTOMATIC MENOPAUSAL STATE: ICD-10-CM

## 2023-10-17 DIAGNOSIS — F17.210 CIGARETTE NICOTINE DEPENDENCE WITHOUT COMPLICATION: ICD-10-CM

## 2023-10-17 PROCEDURE — 77080 DXA BONE DENSITY AXIAL: CPT | Mod: TC,PO

## 2023-10-17 PROCEDURE — 71271 CT THORAX LUNG CANCER SCR C-: CPT | Mod: TC,PO

## 2023-10-17 PROCEDURE — 77081 DXA BONE DENSITY APPENDICULR: CPT | Mod: TC,PO

## 2024-02-21 DIAGNOSIS — J43.9 PULMONARY EMPHYSEMA, UNSPECIFIED EMPHYSEMA TYPE: Primary | ICD-10-CM

## 2024-02-21 DIAGNOSIS — M47.812 SPONDYLOSIS OF CERVICAL REGION WITHOUT MYELOPATHY OR RADICULOPATHY: Primary | ICD-10-CM

## 2024-03-11 ENCOUNTER — HOSPITAL ENCOUNTER (OUTPATIENT)
Dept: RADIOLOGY | Facility: HOSPITAL | Age: 69
Discharge: HOME | End: 2024-03-11
Attending: INTERNAL MEDICINE
Payer: MEDICARE

## 2024-03-11 ENCOUNTER — HOSPITAL ENCOUNTER (OUTPATIENT)
Dept: PULMONOLOGY | Facility: HOSPITAL | Age: 69
Discharge: HOME | End: 2024-03-11
Attending: INTERNAL MEDICINE
Payer: MEDICARE

## 2024-03-11 DIAGNOSIS — J43.9 PULMONARY EMPHYSEMA, UNSPECIFIED EMPHYSEMA TYPE: ICD-10-CM

## 2024-03-11 DIAGNOSIS — M47.812 SPONDYLOSIS OF CERVICAL REGION WITHOUT MYELOPATHY OR RADICULOPATHY: ICD-10-CM

## 2024-03-11 PROCEDURE — 94010 BREATHING CAPACITY TEST: CPT

## 2024-03-11 PROCEDURE — 94727 GAS DIL/WSHOT DETER LNG VOL: CPT

## 2024-03-11 PROCEDURE — 94729 DIFFUSING CAPACITY: CPT

## 2024-03-11 PROCEDURE — 72050 X-RAY EXAM NECK SPINE 4/5VWS: CPT | Mod: TC

## 2024-03-11 PROCEDURE — 94060 EVALUATION OF WHEEZING: CPT

## 2024-05-31 DIAGNOSIS — Z12.31 ENCOUNTER FOR SCREENING MAMMOGRAM FOR MALIGNANT NEOPLASM OF BREAST: Primary | ICD-10-CM

## 2024-06-05 ENCOUNTER — HOSPITAL ENCOUNTER (OUTPATIENT)
Dept: RADIOLOGY | Facility: HOSPITAL | Age: 69
Discharge: HOME | End: 2024-06-05
Attending: INTERNAL MEDICINE
Payer: MEDICARE

## 2024-06-05 DIAGNOSIS — Z12.31 ENCOUNTER FOR SCREENING MAMMOGRAM FOR MALIGNANT NEOPLASM OF BREAST: ICD-10-CM

## 2024-06-05 PROCEDURE — 77063 BREAST TOMOSYNTHESIS BI: CPT | Mod: TC

## 2024-06-05 PROCEDURE — 77063 BREAST TOMOSYNTHESIS BI: CPT | Mod: 26,,, | Performed by: RADIOLOGY

## 2024-06-05 PROCEDURE — 77067 SCR MAMMO BI INCL CAD: CPT | Mod: 26,,, | Performed by: RADIOLOGY

## 2024-06-05 PROCEDURE — 77067 SCR MAMMO BI INCL CAD: CPT | Mod: TC

## 2024-06-06 DIAGNOSIS — F17.210 CIGARETTE SMOKER: Primary | ICD-10-CM

## 2024-09-28 ENCOUNTER — HOSPITAL ENCOUNTER (INPATIENT)
Facility: HOSPITAL | Age: 69
LOS: 3 days | Discharge: HOME HEALTH | DRG: 758 | End: 2024-10-01
Attending: EMERGENCY MEDICINE | Admitting: HOSPITALIST
Payer: MEDICARE

## 2024-09-28 DIAGNOSIS — E87.6 HYPOKALEMIA: ICD-10-CM

## 2024-09-28 DIAGNOSIS — E87.8 HYPOCHLOREMIA: ICD-10-CM

## 2024-09-28 DIAGNOSIS — N76.4 LABIAL ABSCESS: Primary | ICD-10-CM

## 2024-09-28 DIAGNOSIS — E87.1 HYPONATREMIA: ICD-10-CM

## 2024-09-28 LAB
ALBUMIN SERPL BCP-MCNC: 2.9 G/DL (ref 3.5–5.2)
ALP SERPL-CCNC: 143 U/L (ref 55–135)
ALT SERPL W/O P-5'-P-CCNC: 23 U/L (ref 10–44)
ANION GAP SERPL CALC-SCNC: 18 MMOL/L (ref 8–16)
AST SERPL-CCNC: 28 U/L (ref 10–40)
BASOPHILS NFR BLD: 0 % (ref 0–1.9)
BILIRUB SERPL-MCNC: 1.5 MG/DL (ref 0.1–1)
BUN SERPL-MCNC: 11 MG/DL (ref 8–23)
CALCIUM SERPL-MCNC: 8.9 MG/DL (ref 8.7–10.5)
CHLORIDE SERPL-SCNC: 82 MMOL/L (ref 95–110)
CO2 SERPL-SCNC: 28 MMOL/L (ref 23–29)
CREAT SERPL-MCNC: 0.7 MG/DL (ref 0.5–1.4)
DIFFERENTIAL METHOD BLD: ABNORMAL
EOSINOPHIL NFR BLD: 0 % (ref 0–8)
ERYTHROCYTE [DISTWIDTH] IN BLOOD BY AUTOMATED COUNT: 12.8 % (ref 11.5–14.5)
EST. GFR  (NO RACE VARIABLE): >60 ML/MIN/1.73 M^2
GLUCOSE SERPL-MCNC: 119 MG/DL (ref 70–110)
HCT VFR BLD AUTO: 38.2 % (ref 37–48.5)
HGB BLD-MCNC: 13.2 G/DL (ref 12–16)
IMM GRANULOCYTES # BLD AUTO: ABNORMAL K/UL (ref 0–0.04)
IMM GRANULOCYTES NFR BLD AUTO: ABNORMAL % (ref 0–0.5)
LACTATE SERPL-SCNC: 1 MMOL/L (ref 0.5–2.2)
LYMPHOCYTES NFR BLD: 2 % (ref 18–48)
MCH RBC QN AUTO: 29.8 PG (ref 27–31)
MCHC RBC AUTO-ENTMCNC: 34.6 G/DL (ref 32–36)
MCV RBC AUTO: 86 FL (ref 82–98)
MONOCYTES NFR BLD: 4 % (ref 4–15)
NEUTROPHILS NFR BLD: 91 % (ref 38–73)
NEUTS BAND NFR BLD MANUAL: 3 %
NRBC BLD-RTO: 0 /100 WBC
PLATELET # BLD AUTO: 239 K/UL (ref 150–450)
PMV BLD AUTO: 10.2 FL (ref 9.2–12.9)
POTASSIUM SERPL-SCNC: 3 MMOL/L (ref 3.5–5.1)
PROT SERPL-MCNC: 6.3 G/DL (ref 6–8.4)
RBC # BLD AUTO: 4.43 M/UL (ref 4–5.4)
SODIUM SERPL-SCNC: 128 MMOL/L (ref 136–145)
WBC # BLD AUTO: 26.86 K/UL (ref 3.9–12.7)

## 2024-09-28 PROCEDURE — 96361 HYDRATE IV INFUSION ADD-ON: CPT

## 2024-09-28 PROCEDURE — 96367 TX/PROPH/DG ADDL SEQ IV INF: CPT

## 2024-09-28 PROCEDURE — 94799 UNLISTED PULMONARY SVC/PX: CPT

## 2024-09-28 PROCEDURE — 63600175 PHARM REV CODE 636 W HCPCS: Mod: JZ,JG | Performed by: EMERGENCY MEDICINE

## 2024-09-28 PROCEDURE — 87040 BLOOD CULTURE FOR BACTERIA: CPT | Mod: 59 | Performed by: NURSE PRACTITIONER

## 2024-09-28 PROCEDURE — 96365 THER/PROPH/DIAG IV INF INIT: CPT

## 2024-09-28 PROCEDURE — 85007 BL SMEAR W/DIFF WBC COUNT: CPT | Performed by: EMERGENCY MEDICINE

## 2024-09-28 PROCEDURE — 85027 COMPLETE CBC AUTOMATED: CPT | Performed by: EMERGENCY MEDICINE

## 2024-09-28 PROCEDURE — 36415 COLL VENOUS BLD VENIPUNCTURE: CPT | Performed by: EMERGENCY MEDICINE

## 2024-09-28 PROCEDURE — 99285 EMERGENCY DEPT VISIT HI MDM: CPT | Mod: 25

## 2024-09-28 PROCEDURE — 94760 N-INVAS EAR/PLS OXIMETRY 1: CPT

## 2024-09-28 PROCEDURE — 11000001 HC ACUTE MED/SURG PRIVATE ROOM

## 2024-09-28 PROCEDURE — 99900035 HC TECH TIME PER 15 MIN (STAT)

## 2024-09-28 PROCEDURE — 83605 ASSAY OF LACTIC ACID: CPT | Performed by: NURSE PRACTITIONER

## 2024-09-28 PROCEDURE — 80053 COMPREHEN METABOLIC PANEL: CPT | Performed by: EMERGENCY MEDICINE

## 2024-09-28 PROCEDURE — 25500020 PHARM REV CODE 255

## 2024-09-28 PROCEDURE — A9698 NON-RAD CONTRAST MATERIALNOC: HCPCS

## 2024-09-28 PROCEDURE — 25000003 PHARM REV CODE 250: Performed by: EMERGENCY MEDICINE

## 2024-09-28 RX ORDER — IBUPROFEN 200 MG
24 TABLET ORAL
Status: DISCONTINUED | OUTPATIENT
Start: 2024-09-28 | End: 2024-10-01 | Stop reason: HOSPADM

## 2024-09-28 RX ORDER — ACETAMINOPHEN 325 MG/1
650 TABLET ORAL EVERY 4 HOURS PRN
Status: DISCONTINUED | OUTPATIENT
Start: 2024-09-28 | End: 2024-10-01

## 2024-09-28 RX ORDER — ALUMINUM HYDROXIDE, MAGNESIUM HYDROXIDE, AND SIMETHICONE 1200; 120; 1200 MG/30ML; MG/30ML; MG/30ML
30 SUSPENSION ORAL 4 TIMES DAILY PRN
Status: DISCONTINUED | OUTPATIENT
Start: 2024-09-28 | End: 2024-10-01 | Stop reason: HOSPADM

## 2024-09-28 RX ORDER — NALOXONE HCL 0.4 MG/ML
0.02 VIAL (ML) INJECTION
Status: DISCONTINUED | OUTPATIENT
Start: 2024-09-28 | End: 2024-10-01 | Stop reason: HOSPADM

## 2024-09-28 RX ORDER — SODIUM CHLORIDE 0.9 % (FLUSH) 0.9 %
10 SYRINGE (ML) INJECTION EVERY 8 HOURS PRN
Status: DISCONTINUED | OUTPATIENT
Start: 2024-09-28 | End: 2024-10-01 | Stop reason: HOSPADM

## 2024-09-28 RX ORDER — SODIUM,POTASSIUM PHOSPHATES 280-250MG
2 POWDER IN PACKET (EA) ORAL
Status: DISCONTINUED | OUTPATIENT
Start: 2024-09-28 | End: 2024-10-01 | Stop reason: HOSPADM

## 2024-09-28 RX ORDER — METRONIDAZOLE 500 MG/100ML
500 INJECTION, SOLUTION INTRAVENOUS
Status: DISCONTINUED | OUTPATIENT
Start: 2024-09-29 | End: 2024-09-30

## 2024-09-28 RX ORDER — SIMETHICONE 80 MG
1 TABLET,CHEWABLE ORAL 4 TIMES DAILY PRN
Status: DISCONTINUED | OUTPATIENT
Start: 2024-09-28 | End: 2024-10-01 | Stop reason: HOSPADM

## 2024-09-28 RX ORDER — MORPHINE SULFATE 10 MG/ML
5 INJECTION INTRAMUSCULAR; INTRAVENOUS; SUBCUTANEOUS EVERY 4 HOURS PRN
Status: DISCONTINUED | OUTPATIENT
Start: 2024-09-28 | End: 2024-10-01

## 2024-09-28 RX ORDER — ONDANSETRON HYDROCHLORIDE 2 MG/ML
4 INJECTION, SOLUTION INTRAVENOUS EVERY 8 HOURS PRN
Status: DISCONTINUED | OUTPATIENT
Start: 2024-09-28 | End: 2024-10-01 | Stop reason: HOSPADM

## 2024-09-28 RX ORDER — TALC
9 POWDER (GRAM) TOPICAL NIGHTLY PRN
Status: DISCONTINUED | OUTPATIENT
Start: 2024-09-28 | End: 2024-10-01 | Stop reason: HOSPADM

## 2024-09-28 RX ORDER — LANOLIN ALCOHOL/MO/W.PET/CERES
800 CREAM (GRAM) TOPICAL
Status: DISCONTINUED | OUTPATIENT
Start: 2024-09-28 | End: 2024-10-01 | Stop reason: HOSPADM

## 2024-09-28 RX ORDER — HYDROMORPHONE HYDROCHLORIDE 1 MG/ML
1 INJECTION, SOLUTION INTRAMUSCULAR; INTRAVENOUS; SUBCUTANEOUS EVERY 4 HOURS PRN
Status: DISCONTINUED | OUTPATIENT
Start: 2024-09-28 | End: 2024-10-01

## 2024-09-28 RX ORDER — METRONIDAZOLE 500 MG/100ML
500 INJECTION, SOLUTION INTRAVENOUS
Status: COMPLETED | OUTPATIENT
Start: 2024-09-28 | End: 2024-09-28

## 2024-09-28 RX ORDER — LEVOFLOXACIN 5 MG/ML
500 INJECTION, SOLUTION INTRAVENOUS
Status: DISCONTINUED | OUTPATIENT
Start: 2024-09-28 | End: 2024-09-30

## 2024-09-28 RX ORDER — GLUCAGON 1 MG
1 KIT INJECTION
Status: DISCONTINUED | OUTPATIENT
Start: 2024-09-28 | End: 2024-10-01 | Stop reason: HOSPADM

## 2024-09-28 RX ORDER — IPRATROPIUM BROMIDE AND ALBUTEROL SULFATE 2.5; .5 MG/3ML; MG/3ML
3 SOLUTION RESPIRATORY (INHALATION) EVERY 4 HOURS PRN
Status: DISCONTINUED | OUTPATIENT
Start: 2024-09-28 | End: 2024-10-01 | Stop reason: HOSPADM

## 2024-09-28 RX ORDER — ACETAMINOPHEN 325 MG/1
650 TABLET ORAL EVERY 6 HOURS PRN
Status: DISCONTINUED | OUTPATIENT
Start: 2024-09-28 | End: 2024-10-01

## 2024-09-28 RX ORDER — IBUPROFEN 200 MG
16 TABLET ORAL
Status: DISCONTINUED | OUTPATIENT
Start: 2024-09-28 | End: 2024-10-01 | Stop reason: HOSPADM

## 2024-09-28 RX ADMIN — VANCOMYCIN HYDROCHLORIDE 1500 MG: 1.5 INJECTION, POWDER, LYOPHILIZED, FOR SOLUTION INTRAVENOUS at 09:09

## 2024-09-28 RX ADMIN — IOHEXOL 75 ML: 350 INJECTION, SOLUTION INTRAVENOUS at 07:09

## 2024-09-28 RX ADMIN — LEVOFLOXACIN 500 MG: 500 INJECTION, SOLUTION INTRAVENOUS at 08:09

## 2024-09-28 RX ADMIN — SODIUM CHLORIDE 1000 ML: 0.9 INJECTION, SOLUTION INTRAVENOUS at 07:09

## 2024-09-28 RX ADMIN — METRONIDAZOLE 500 MG: 5 INJECTION, SOLUTION INTRAVENOUS at 06:09

## 2024-09-28 RX ADMIN — IOHEXOL 500 ML: 9 SOLUTION ORAL at 07:09

## 2024-09-28 RX ADMIN — POTASSIUM BICARBONATE 60 MEQ: 391 TABLET, EFFERVESCENT ORAL at 07:09

## 2024-09-28 NOTE — ED PROVIDER NOTES
Encounter Date: 9/28/2024       History     Chief Complaint   Patient presents with    Rectal Bleeding     Cyst on rectum      69-year-old female history of anxiety depression emphysema HSV presents to the ER with what she describes as weeks of a rectal cyst and rectal bleeding.  She is complaining of pain.  She has not yet seen a physician for this.    The history is provided by the patient and a friend.     Review of patient's allergies indicates:   Allergen Reactions    Penicillin g      Childhood allergy--unknown reaction    Sulfa (sulfonamide antibiotics)      Childhood allergy--unknown reaction     Past Medical History:   Diagnosis Date    Anxiety     Arthritis     back, neck and knees    Depression     Emphysema of lung     HSV infection     Hypertension      Past Surgical History:   Procedure Laterality Date    COLONOSCOPY N/A 10/26/2020    Procedure: COLONOSCOPY;  Surgeon: Gerard Hernandez MD;  Location: [facility];  Service: Endoscopy;  Laterality: N/A;    HIP ARTHROPLASTY      bilat    JOINT REPLACEMENT      bilat hip    TRANSFORAMINAL EPIDURAL INJECTION OF STEROID Bilateral 4/6/2021    Procedure: Injection,steroid,epidural,transforaminal approach;  Surgeon: Scott Barger MD;  Location: [facility];  Service: Pain Management;  Laterality: Bilateral;  L4-5     Family History   Problem Relation Name Age of Onset    COPD Mother      Hypertension Mother      Heart disease Mother      Alcohol abuse Father      Depression Sister      Hypertension Sister      Colon cancer Maternal Grandmother      Breast cancer Paternal Aunt      Ovarian cancer Neg Hx      Psoriasis Neg Hx      Lupus Neg Hx      Eczema Neg Hx      Melanoma Neg Hx       Social History     Tobacco Use    Smoking status: Former     Current packs/day: 0.00     Average packs/day: 0.5 packs/day for 40.0 years (20.0 ttl pk-yrs)     Types: Cigarettes     Start date: 9/28/1981     Quit date: 9/28/2021     Years since quitting: 3.0    Smokeless tobacco: Never     Tobacco comments:     Patient down to 8 cigs a day   Substance Use Topics    Alcohol use: Yes     Comment: socially    Drug use: Yes     Types: Oxycodone, Marijuana     Review of Systems   Gastrointestinal:  Positive for blood in stool.       Physical Exam     Initial Vitals [09/28/24 1746]   BP Pulse Resp Temp SpO2   111/73 85 18 98 °F (36.7 °C) 95 %      MAP       --         Physical Exam    Nursing note and vitals reviewed.  Constitutional: She appears well-developed and well-nourished.   HENT:   Head: Normocephalic and atraumatic.   Eyes: EOM are normal.   Neck: Neck supple.   Normal range of motion.  Pulmonary/Chest: No respiratory distress.   Genitourinary:    Genitourinary Comments: On exam patient has no rectal abnormality but she does have spontaneously draining labial abscess.  Right labia is swollen.  On the inferior aspect she is draining large amounts of bloody purulent fluid         Musculoskeletal:         General: Normal range of motion.      Cervical back: Normal range of motion and neck supple.     Neurological: She is alert and oriented to person, place, and time.   Skin: Skin is warm and dry.   Psychiatric: She has a normal mood and affect. Her behavior is normal. Judgment and thought content normal.         ED Course   Procedures  Labs Reviewed   CBC W/ AUTO DIFFERENTIAL - Abnormal       Result Value    WBC 26.86 (*)     RBC 4.43      Hemoglobin 13.2      Hematocrit 38.2      MCV 86      MCH 29.8      MCHC 34.6      RDW 12.8      Platelets 239      MPV 10.2      Immature Granulocytes CANCELED      Immature Grans (Abs) CANCELED      nRBC 0      Gran % 91.0 (*)     Lymph % 2.0 (*)     Mono % 4.0      Eosinophil % 0.0      Basophil % 0.0      Bands 3.0      Differential Method Manual     COMPREHENSIVE METABOLIC PANEL - Abnormal    Sodium 128 (*)     Potassium 3.0 (*)     Chloride 82 (*)     CO2 28      Glucose 119 (*)     BUN 11      Creatinine 0.7      Calcium 8.9      Total Protein 6.3       Albumin 2.9 (*)     Total Bilirubin 1.5 (*)     Alkaline Phosphatase 143 (*)     AST 28      ALT 23      eGFR >60      Anion Gap 18 (*)    CULTURE, BLOOD   CULTURE, BLOOD   LACTIC ACID, PLASMA          Imaging Results              CT Abdomen Pelvis With IV Contrast Routine Oral Contrast (In process)                      Medications   vancomycin 1,500 mg in D5W 250 mL IVPB (admixture device) (1,500 mg Intravenous New Bag 9/28/24 2123)   levoFLOXacin 500 mg/100 mL IVPB 500 mg (0 mg Intravenous Stopped 9/28/24 2123)   metronidazole IVPB 500 mg (0 mg Intravenous Stopped 9/28/24 1955)   iohexoL (OMNIPAQUE 9) 9 mg iodine/mL oral solution (500 mLs Oral Given 9/28/24 1924)   iohexoL (OMNIPAQUE 350) 350 mg iodine/mL injection (75 mLs Intravenous Given 9/28/24 1924)   sodium chloride 0.9% bolus 1,000 mL 1,000 mL (0 mLs Intravenous Stopped 9/28/24 2045)   potassium bicarbonate disintegrating tablet 60 mEq (60 mEq Oral Given 9/28/24 1945)     Medical Decision Making  69-year-old female presents to the ER complaining of rectal pain and bleeding.  On exam this is clearly a labial abscess that is draining.  Labs demonstrate hyponatremia hypokalemia hypochloremia.  CT demonstrates labial abscess.  This is draining spontaneously.  I have discussed the case with General surgery and OBGYN.  NPO after midnight, OBGYN will consult in the morning.    Amount and/or Complexity of Data Reviewed  Labs: ordered. Decision-making details documented in ED Course.  Radiology: ordered.    Risk  Prescription drug management.  Decision regarding hospitalization.               ED Course as of 09/28/24 2147   Sat Sep 28, 2024   1759 BP: 111/73 [EF]   1759 Temp: 98 °F (36.7 °C) [EF]   1759 Temp Source: Oral [EF]   1759 Pulse: 85 [EF]   1759 Resp: 18 [EF]   1759 SpO2: 95 % [EF]   1901 WBC(!): 26.86 [EF]   1901 Hemoglobin: 13.2 [EF]   1901 Platelet Count: 239 [EF]   1933 Sodium(!): 128 [EF]   1933 Potassium(!): 3.0 [EF]   1933 Chloride(!): 82 [EF]   1933  Albumin(!): 2.9 [EF]   1933 BILIRUBIN TOTAL(!): 1.5 [EF]   1933 ALP(!): 143 [EF]   2108 IMPRESSION: 1. Approximately 4.5 x 0.5 x 2.5 cm gas and fluid containing abscess in right labia majora/perineum. 2. Colonic diverticulosis without evidence of diverticulitis. 3. 5.5 cm simple appearing cystic structure in left hemipelvis, likely an adnexal cyst. Recommend nonemergent ultrasound. 4. Less pertinent findings as detailed above. Dictated and Authenticated by: James Duncan MD 09/28/2024 9:06 PM Central Time ([place] & [place]   [EF]   2120 Case d/w dr burger, does not think nec fasc, thinks primary labial abscess [EF]   2127 T Scooby to admit [EF]   2128 Case d/w dr jurado, he will consult tomorrow, npo after midnight [EF]      ED Course User Index  [EF] Dilan Loya MD                           Clinical Impression:  Final diagnoses:  [N76.4] Labial abscess (Primary)  [E87.1] Hyponatremia  [E87.6] Hypokalemia  [E87.8] Hypochloremia          ED Disposition Condition    Admit Stable                Dilan Loya MD  09/28/24 4021

## 2024-09-29 LAB
ALBUMIN SERPL BCP-MCNC: 2.6 G/DL (ref 3.5–5.2)
ALP SERPL-CCNC: 138 U/L (ref 55–135)
ALT SERPL W/O P-5'-P-CCNC: 19 U/L (ref 10–44)
ANION GAP SERPL CALC-SCNC: 12 MMOL/L (ref 8–16)
AST SERPL-CCNC: 24 U/L (ref 10–40)
BASOPHILS # BLD AUTO: 0.07 K/UL (ref 0–0.2)
BASOPHILS NFR BLD: 0.4 % (ref 0–1.9)
BILIRUB SERPL-MCNC: 1.2 MG/DL (ref 0.1–1)
BUN SERPL-MCNC: 11 MG/DL (ref 8–23)
CALCIUM SERPL-MCNC: 8.3 MG/DL (ref 8.7–10.5)
CHLORIDE SERPL-SCNC: 86 MMOL/L (ref 95–110)
CO2 SERPL-SCNC: 30 MMOL/L (ref 23–29)
CREAT SERPL-MCNC: 0.6 MG/DL (ref 0.5–1.4)
DIFFERENTIAL METHOD BLD: ABNORMAL
EOSINOPHIL # BLD AUTO: 0 K/UL (ref 0–0.5)
EOSINOPHIL NFR BLD: 0.1 % (ref 0–8)
ERYTHROCYTE [DISTWIDTH] IN BLOOD BY AUTOMATED COUNT: 12.7 % (ref 11.5–14.5)
EST. GFR  (NO RACE VARIABLE): >60 ML/MIN/1.73 M^2
GLUCOSE SERPL-MCNC: 96 MG/DL (ref 70–110)
HCT VFR BLD AUTO: 34.3 % (ref 37–48.5)
HGB BLD-MCNC: 11.9 G/DL (ref 12–16)
IMM GRANULOCYTES # BLD AUTO: 0.51 K/UL (ref 0–0.04)
IMM GRANULOCYTES NFR BLD AUTO: 2.6 % (ref 0–0.5)
LYMPHOCYTES # BLD AUTO: 0.7 K/UL (ref 1–4.8)
LYMPHOCYTES NFR BLD: 3.5 % (ref 18–48)
MAGNESIUM SERPL-MCNC: 1.7 MG/DL (ref 1.6–2.6)
MCH RBC QN AUTO: 29.9 PG (ref 27–31)
MCHC RBC AUTO-ENTMCNC: 34.7 G/DL (ref 32–36)
MCV RBC AUTO: 86 FL (ref 82–98)
MONOCYTES # BLD AUTO: 0.8 K/UL (ref 0.3–1)
MONOCYTES NFR BLD: 4.1 % (ref 4–15)
NEUTROPHILS # BLD AUTO: 17.4 K/UL (ref 1.8–7.7)
NEUTROPHILS NFR BLD: 89.3 % (ref 38–73)
NRBC BLD-RTO: 0 /100 WBC
PHOSPHATE SERPL-MCNC: 2.4 MG/DL (ref 2.7–4.5)
PLATELET # BLD AUTO: 223 K/UL (ref 150–450)
PMV BLD AUTO: 9.9 FL (ref 9.2–12.9)
POTASSIUM SERPL-SCNC: 3.7 MMOL/L (ref 3.5–5.1)
PROT SERPL-MCNC: 5.6 G/DL (ref 6–8.4)
RBC # BLD AUTO: 3.98 M/UL (ref 4–5.4)
SODIUM SERPL-SCNC: 128 MMOL/L (ref 136–145)
WBC # BLD AUTO: 19.47 K/UL (ref 3.9–12.7)

## 2024-09-29 PROCEDURE — 25000003 PHARM REV CODE 250: Performed by: HOSPITALIST

## 2024-09-29 PROCEDURE — 63600175 PHARM REV CODE 636 W HCPCS: Performed by: EMERGENCY MEDICINE

## 2024-09-29 PROCEDURE — 94761 N-INVAS EAR/PLS OXIMETRY MLT: CPT

## 2024-09-29 PROCEDURE — 25000003 PHARM REV CODE 250: Performed by: NURSE PRACTITIONER

## 2024-09-29 PROCEDURE — 25000242 PHARM REV CODE 250 ALT 637 W/ HCPCS: Performed by: NURSE PRACTITIONER

## 2024-09-29 PROCEDURE — 94640 AIRWAY INHALATION TREATMENT: CPT

## 2024-09-29 PROCEDURE — 63600175 PHARM REV CODE 636 W HCPCS: Performed by: HOSPITALIST

## 2024-09-29 PROCEDURE — 63600175 PHARM REV CODE 636 W HCPCS: Mod: JZ,JG | Performed by: NURSE PRACTITIONER

## 2024-09-29 PROCEDURE — 80053 COMPREHEN METABOLIC PANEL: CPT | Performed by: NURSE PRACTITIONER

## 2024-09-29 PROCEDURE — 36415 COLL VENOUS BLD VENIPUNCTURE: CPT | Performed by: NURSE PRACTITIONER

## 2024-09-29 PROCEDURE — 11000001 HC ACUTE MED/SURG PRIVATE ROOM

## 2024-09-29 PROCEDURE — 84100 ASSAY OF PHOSPHORUS: CPT | Performed by: NURSE PRACTITIONER

## 2024-09-29 PROCEDURE — 85025 COMPLETE CBC W/AUTO DIFF WBC: CPT | Performed by: NURSE PRACTITIONER

## 2024-09-29 PROCEDURE — 83735 ASSAY OF MAGNESIUM: CPT | Performed by: NURSE PRACTITIONER

## 2024-09-29 RX ORDER — POTASSIUM CHLORIDE 750 MG/1
30 TABLET, EXTENDED RELEASE ORAL ONCE
Status: COMPLETED | OUTPATIENT
Start: 2024-09-29 | End: 2024-09-29

## 2024-09-29 RX ADMIN — METRONIDAZOLE 500 MG: 5 INJECTION, SOLUTION INTRAVENOUS at 05:09

## 2024-09-29 RX ADMIN — POTASSIUM & SODIUM PHOSPHATES POWDER PACK 280-160-250 MG 2 PACKET: 280-160-250 PACK at 09:09

## 2024-09-29 RX ADMIN — POTASSIUM CHLORIDE 30 MEQ: 750 TABLET, EXTENDED RELEASE ORAL at 04:09

## 2024-09-29 RX ADMIN — IPRATROPIUM BROMIDE AND ALBUTEROL SULFATE 3 ML: .5; 3 SOLUTION RESPIRATORY (INHALATION) at 12:09

## 2024-09-29 RX ADMIN — METRONIDAZOLE 500 MG: 5 INJECTION, SOLUTION INTRAVENOUS at 02:09

## 2024-09-29 RX ADMIN — Medication 800 MG: at 04:09

## 2024-09-29 RX ADMIN — MAGNESIUM OXIDE TAB 400 MG (241.3 MG ELEMENTAL MG) 800 MG: 400 (241.3 MG) TAB at 09:09

## 2024-09-29 RX ADMIN — VANCOMYCIN HYDROCHLORIDE 1000 MG: 1 INJECTION, POWDER, LYOPHILIZED, FOR SOLUTION INTRAVENOUS at 09:09

## 2024-09-29 RX ADMIN — LEVOFLOXACIN 500 MG: 500 INJECTION, SOLUTION INTRAVENOUS at 06:09

## 2024-09-29 RX ADMIN — METRONIDAZOLE 500 MG: 5 INJECTION, SOLUTION INTRAVENOUS at 10:09

## 2024-09-29 RX ADMIN — HYDROMORPHONE HYDROCHLORIDE 1 MG: 1 INJECTION, SOLUTION INTRAMUSCULAR; INTRAVENOUS; SUBCUTANEOUS at 07:09

## 2024-09-29 RX ADMIN — Medication 800 MG: at 09:09

## 2024-09-29 RX ADMIN — VANCOMYCIN HYDROCHLORIDE 1000 MG: 1 INJECTION, POWDER, LYOPHILIZED, FOR SOLUTION INTRAVENOUS at 08:09

## 2024-09-29 RX ADMIN — POTASSIUM & SODIUM PHOSPHATES POWDER PACK 280-160-250 MG 2 PACKET: 280-160-250 PACK at 04:09

## 2024-09-29 RX ADMIN — IPRATROPIUM BROMIDE AND ALBUTEROL SULFATE 3 ML: .5; 3 SOLUTION RESPIRATORY (INHALATION) at 07:09

## 2024-09-29 NOTE — ASSESSMENT & PLAN NOTE
Acute problem   NPO after midnight   Appreciate recommendations from gyn   Levaquin   Flagyl   Vancomycin

## 2024-09-29 NOTE — SUBJECTIVE & OBJECTIVE
Past Medical History:   Diagnosis Date    Anxiety     Arthritis     back, neck and knees    Depression     Emphysema of lung     HSV infection     Hypertension        Past Surgical History:   Procedure Laterality Date    COLONOSCOPY N/A 10/26/2020    Procedure: COLONOSCOPY;  Surgeon: Gerard Hernandez MD;  Location: [facility];  Service: Endoscopy;  Laterality: N/A;    HIP ARTHROPLASTY      bilat    JOINT REPLACEMENT      bilat hip    TRANSFORAMINAL EPIDURAL INJECTION OF STEROID Bilateral 4/6/2021    Procedure: Injection,steroid,epidural,transforaminal approach;  Surgeon: Scott Barger MD;  Location: [facility];  Service: Pain Management;  Laterality: Bilateral;  L4-5       Review of patient's allergies indicates:   Allergen Reactions    Penicillin g      Childhood allergy--unknown reaction    Sulfa (sulfonamide antibiotics)      Childhood allergy--unknown reaction       No current facility-administered medications on file prior to encounter.     Current Outpatient Medications on File Prior to Encounter   Medication Sig    acetaminophen (TYLENOL 8 HOUR ORAL) Take by mouth.    albuterol (VENTOLIN HFA) 90 mcg/actuation inhaler Inhale 2 puffs into the lungs every 4 (four) hours as needed for Wheezing or Shortness of Breath. Rescue    albuterol-ipratropium (DUO-NEB) 2.5 mg-0.5 mg/3 mL nebulizer solution Take 3 mLs by nebulization every 6 (six) hours as needed for Wheezing or Shortness of Breath. Rescue    ascorbic acid, vitamin C, (VITAMIN C) 100 MG tablet Take 100 mg by mouth once daily.    CHOLECALCIFEROL, VITAMIN D3, (VITAMIN D-3 ORAL) No Sig Provided    EScitalopram oxalate (LEXAPRO) 20 MG tablet TAKE 1 TABLET(20 MG) BY MOUTH EVERY DAY    fluticasone-umeclidin-vilanter (TRELEGY ELLIPTA) 100-62.5-25 mcg DsDv Inhale 1 puff into the lungs once daily.    gabapentin (NEURONTIN) 300 MG capsule Take 2 capsules (600 mg total) by mouth every evening.    lisinopriL-hydrochlorothiazide (PRINZIDE,ZESTORETIC) 10-12.5 mg per tablet  TAKE 1 TABLET BY MOUTH EVERY DAY    multivitamin capsule Take 1 capsule by mouth once daily.    POTASSIUM/MAGNESIUM (MAGNESIUM-POTASSIUM ORAL) Take 1 tablet by mouth once daily. Every day    predniSONE (DELTASONE) 20 MG tablet Take 1 tablet (20 mg total) by mouth once daily. Take one daily for 5 days, repeat for breathing problems.    vitamin B complex (B COMPLEX 1 ORAL) Take by mouth.     Family History       Problem Relation (Age of Onset)    Alcohol abuse Father    Breast cancer Paternal Aunt    COPD Mother    Colon cancer Maternal Grandmother    Depression Sister    Heart disease Mother    Hypertension Mother, Sister          Tobacco Use    Smoking status: Former     Current packs/day: 0.00     Average packs/day: 0.5 packs/day for 40.0 years (20.0 ttl pk-yrs)     Types: Cigarettes     Start date: 9/28/1981     Quit date: 9/28/2021     Years since quitting: 3.0    Smokeless tobacco: Never    Tobacco comments:     Patient down to 8 cigs a day   Substance and Sexual Activity    Alcohol use: Yes     Comment: socially    Drug use: Yes     Types: Oxycodone, Marijuana    Sexual activity: Not Currently     Review of Systems   Constitutional:  Positive for activity change and fever. Negative for chills and diaphoresis.   HENT:  Negative for congestion, nosebleeds and tinnitus.    Eyes:  Negative for photophobia and visual disturbance.   Respiratory:  Negative for cough, chest tightness, shortness of breath and wheezing.    Cardiovascular:  Negative for chest pain, palpitations and leg swelling.   Gastrointestinal:  Positive for rectal pain. Negative for abdominal distention, abdominal pain, constipation, diarrhea, nausea and vomiting.   Endocrine: Negative for cold intolerance and heat intolerance.   Genitourinary:  Negative for difficulty urinating, dysuria, frequency, hematuria and urgency.   Musculoskeletal:  Negative for arthralgias, back pain and myalgias.   Skin:  Negative for pallor, rash and wound.    Allergic/Immunologic: Negative for immunocompromised state.   Neurological:  Negative for dizziness, tremors, facial asymmetry, speech difficulty and weakness.   Hematological:  Negative for adenopathy. Does not bruise/bleed easily.   Psychiatric/Behavioral:  Negative for confusion and sleep disturbance. The patient is not nervous/anxious.      Objective:     Vital Signs (Most Recent):  Temp: 98 °F (36.7 °C) (09/28/24 1746)  Pulse: 76 (09/28/24 2231)  Resp: 18 (09/28/24 1746)  BP: (!) 110/57 (09/28/24 2131)  SpO2: 97 % (09/28/24 2231) Vital Signs (24h Range):  Temp:  [98 °F (36.7 °C)] 98 °F (36.7 °C)  Pulse:  [76-88] 76  Resp:  [18] 18  SpO2:  [95 %-97 %] 97 %  BP: (102-112)/(57-73) 110/57     Weight: 76.2 kg (167 lb 15.9 oz)  Body mass index is 24.81 kg/m².     Physical Exam  Vitals and nursing note reviewed.   Constitutional:       General: She is not in acute distress.     Appearance: She is well-developed. She is ill-appearing. She is not diaphoretic.   HENT:      Head: Normocephalic.      Mouth/Throat:      Mouth: Mucous membranes are dry.   Eyes:      General: No scleral icterus.     Conjunctiva/sclera: Conjunctivae normal.      Pupils: Pupils are equal, round, and reactive to light.   Neck:      Vascular: No JVD.   Cardiovascular:      Rate and Rhythm: Regular rhythm. Tachycardia present.      Heart sounds: Normal heart sounds. No murmur heard.     No friction rub. No gallop.   Pulmonary:      Effort: Pulmonary effort is normal. No respiratory distress.      Breath sounds: Normal breath sounds. No wheezing or rales.   Abdominal:      General: Bowel sounds are normal. There is no distension.      Palpations: Abdomen is soft.      Tenderness: There is no abdominal tenderness. There is no guarding or rebound.   Genitourinary:      Musculoskeletal:         General: No tenderness. Normal range of motion.      Cervical back: Normal range of motion and neck supple.   Lymphadenopathy:      Cervical: No cervical  "adenopathy.   Skin:     General: Skin is warm and dry.      Capillary Refill: Capillary refill takes less than 2 seconds.      Coloration: Skin is not pale.      Findings: No erythema or rash.   Neurological:      Mental Status: She is alert and oriented to person, place, and time.      Cranial Nerves: No cranial nerve deficit.      Sensory: No sensory deficit.      Coordination: Coordination normal.      Deep Tendon Reflexes: Reflexes normal.   Psychiatric:         Behavior: Behavior normal.         Thought Content: Thought content normal.         Judgment: Judgment normal.              CRANIAL NERVES     CN III, IV, VI   Pupils are equal, round, and reactive to light.       Significant Labs: All pertinent labs within the past 24 hours have been reviewed.  Blood Culture: No results for input(s): "LABBLOO" in the last 48 hours.  CBC:   Recent Labs   Lab 09/28/24  1839   WBC 26.86*   HGB 13.2   HCT 38.2        CMP:   Recent Labs   Lab 09/28/24  1839   *   K 3.0*   CL 82*   CO2 28   *   BUN 11   CREATININE 0.7   CALCIUM 8.9   PROT 6.3   ALBUMIN 2.9*   BILITOT 1.5*   ALKPHOS 143*   AST 28   ALT 23   ANIONGAP 18*     Lactic Acid:   Recent Labs   Lab 09/28/24  2204   LACTATE 1.0       Significant Imaging: I have reviewed all pertinent imaging results/findings within the past 24 hours.    CT:   IMPRESSION:   1. Approximately 4.5 x 0.5 x 2.5 cm gas and fluid containing abscess in right labia majora/perineum. 2. Colonic diverticulosis without evidence of diverticulitis. 3. 5.5 cm simple appearing cystic structure in left hemipelvis, likely an adnexal cyst. Recommend nonemergent ultrasound.  "

## 2024-09-29 NOTE — CONSULTS
"Pharmacokinetic Initial Assessment: IV Vancomycin    Assessment/Plan:    Initiate intravenous vancomycin with dose of 1500 mg once followed by a maintenance dose of vancomycin 1000mg IV every 12 hours  Desired empiric serum trough concentration is 10 to 15 mcg/mL  Draw vancomycin trough level 60 min prior to fourth dose on 09/30/24 at approximately 0830  Pharmacy will continue to follow and monitor vancomycin.      Please contact pharmacy at extension [phone] with any questions regarding this assessment.     Thank you for the consult,   Venkatesh Catherine       Patient brief summary:  Abbey Hagan is a 69 y.o. female initiated on antimicrobial therapy with IV Vancomycin for treatment of suspected skin & soft tissue infection    Drug Allergies:   Review of patient's allergies indicates:   Allergen Reactions    Penicillin g      Childhood allergy--unknown reaction    Sulfa (sulfonamide antibiotics)      Childhood allergy--unknown reaction       Actual Body Weight:   76.2kg    Renal Function:   Estimated Creatinine Clearance: 80.4 mL/min (based on SCr of 0.7 mg/dL).,     CBC (last 72 hours):  Recent Labs   Lab Result Units 09/28/24  1839   WBC K/uL 26.86*   Hemoglobin g/dL 13.2   Hematocrit % 38.2   Platelets K/uL 239   Gran % % 91.0*   Lymph % % 2.0*   Mono % % 4.0   Eosinophil % % 0.0   Basophil % % 0.0   Differential Method  Manual       Metabolic Panel (last 72 hours):  Recent Labs   Lab Result Units 09/28/24  1839   Sodium mmol/L 128*   Potassium mmol/L 3.0*   Chloride mmol/L 82*   CO2 mmol/L 28   Glucose mg/dL 119*   BUN mg/dL 11   Creatinine mg/dL 0.7   Albumin g/dL 2.9*   Total Bilirubin mg/dL 1.5*   Alkaline Phosphatase U/L 143*   AST U/L 28   ALT U/L 23       Drug levels (last 3 results):  No results for input(s): "VANCOMYCINRA", "VANCORANDOM", "VANCOMYCINPE", "VANCOPEAK", "VANCOMYCINTR", "VANCOTROUGH" in the last 72 hours.    Microbiologic Results:  Microbiology Results (last 7 days)       Procedure Component " Value Units Date/Time    Blood culture [[id number]] Collected: 09/28/24 2204    Order Status: Sent Specimen: Blood from Peripheral, Antecubital, Right     Blood culture [[id number]] Collected: 09/28/24 2204    Order Status: Sent Specimen: Blood from Peripheral, Antecubital, Left

## 2024-09-29 NOTE — CARE UPDATE
09/29/24 0027   Patient Assessment/Suction   Level of Consciousness (AVPU) alert   Respiratory Effort Unlabored   Expansion/Accessory Muscles/Retractions no use of accessory muscles;no retractions   All Lung Fields Breath Sounds diminished   CAS Breath Sounds wheezes, expiratory   LLL Breath Sounds diminished   RUL Breath Sounds wheezes, expiratory   RML Breath Sounds wheezes, expiratory   RLL Breath Sounds diminished   PRE-TX-O2   Device (Oxygen Therapy) room air   SpO2 95 %   Pulse Oximetry Type Intermittent   $ Pulse Oximetry - Multiple Charge Pulse Oximetry - Multiple   Pulse 76   Resp (!) 21   Aerosol Therapy   $ Aerosol Therapy Charges Aerosol Treatment   Respiratory Treatment Status (SVN) given   Treatment Route (SVN) oxygen;mask   Patient Position HOB elevated   Post Treatment Assessment (SVN) increased aeration  (pt remains wheezing and states its her normal.)   Signs of Intolerance (SVN) none   Breath Sounds Post-Respiratory Treatment   Throughout All Fields Post-Treatment All Fields   Throughout All Fields Post-Treatment aeration increased;wheezes, expiratory   Post-treatment Heart Rate (beats/min) 69   Post-treatment Resp Rate (breaths/min) 20

## 2024-09-29 NOTE — PLAN OF CARE
[facility] - Med/Surg  Initial Discharge Assessment       Primary Care Provider: Noy Michaud MD    Admission Diagnosis: Labial abscess [N76.4]    Admission Date: 9/28/2024  Expected Discharge Date:     Transition of Care Barriers: None    Payor: HU[employer] MANAGED MEDICARE / Plan: HU[employer] SNP HMO PPO SPECIAL NEEDS / Product Type: Medicare Advantage /     Extended Emergency Contact Information  Primary Emergency Contact: Janell Borges  Mobile Phone: [phone]  Relation: Friend  Preferred language: English   needed? No    Discharge Plan A: Home  Discharge Plan B: Home      [id number] - [place], [place] - [address] & [facility]  [address] [facility] [address]  Phone: [phone] Fax: [phone]    Discharge assessment completed at bedside with pt.  Verified information on facesheet.  Reports lives at listed address alone, is independent with activities and drives self to apts.  Reports uses a rolling walker and nebulizer at home.  Denies HH, HD, opt services, recent hospital stay within 30 days and blood thinners.  States Janell Borges ([phone]) as her POA.  States Janell will provide transportation home at discharge.  Discharge plan is home.  Initial Assessment (most recent)       Adult Discharge Assessment - 09/29/24 0900          Discharge Assessment    Assessment Type Discharge Planning Assessment     Confirmed/corrected address, phone number and insurance Yes     Confirmed Demographics Correct on Facesheet     Source of Information patient     People in Home alone     Do you expect to return to your current living situation? Yes     Do you have help at home or someone to help you manage your care at home? No     Prior to hospitilization cognitive status: Alert/Oriented     Current cognitive status: Alert/Oriented     Walking or Climbing Stairs Difficulty yes     Walking or Climbing Stairs ambulation difficulty, requires equipment      Mobility Management RW     Dressing/Bathing Difficulty no     Equipment Currently Used at Home nebulizer;walker, rolling     Readmission within 30 days? No     Patient currently being followed by outpatient case management? No     Do you currently have service(s) that help you manage your care at home? No     Do you take prescription medications? Yes     Do you have prescription coverage? Yes     Do you have any problems affording any of your prescribed medications? No     Is the patient taking medications as prescribed? yes     How do you get to doctors appointments? car, drives self     Are you on dialysis? No     Do you take coumadin? No     Discharge Plan A Home     Discharge Plan B Home     DME Needed Upon Discharge  none     Discharge Plan discussed with: Patient     Transition of Care Barriers None        Physical Activity    On average, how many days per week do you engage in moderate to strenuous exercise (like a brisk walk)? 0 days     On average, how many minutes do you engage in exercise at this level? 0 min        Financial Resource Strain    How hard is it for you to pay for the very basics like food, housing, medical care, and heating? Not very hard        Housing Stability    In the last 12 months, was there a time when you were not able to pay the mortgage or rent on time? No     At any time in the past 12 months, were you homeless or living in a shelter (including now)? No        Transportation Needs    Has the lack of transportation kept you from medical appointments, meetings, work or from getting things needed for daily living? No        Food Insecurity    Within the past 12 months, you worried that your food would run out before you got the money to buy more. Never true     Within the past 12 months, the food you bought just didn't last and you didn't have money to get more. Never true        Stress    Do you feel stress - tense, restless, nervous, or anxious, or unable to sleep at night  because your mind is troubled all the time - these days? Not at all        Social Isolation    How often do you feel lonely or isolated from those around you?  Rarely        Alcohol Use    Q1: How often do you have a drink containing alcohol? 4 or more times a week     Q2: How many drinks containing alcohol do you have on a typical day when you are drinking? 1 or 2     Q3: How often do you have six or more drinks on one occasion? Never        Utilities    In the past 12 months has the electric, gas, oil, or water company threatened to shut off services in your home? No        Health Literacy    How often do you need to have someone help you when you read instructions, pamphlets, or other written material from your doctor or pharmacy? Never

## 2024-09-29 NOTE — HOSPITAL COURSE
69-year-old female admitted with labial abscess.  Gynecology was consulted.  She was placed on vancomycin, Levaquin, Flagyl.  Patient was seen by gyn and felt that operative incision and drainage was not necessary at this point in time.  The abscess was draining appropriately on its own.  Patient was transitioned to oral antibiotics and discharged home with follow-up with gyn.  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

## 2024-09-29 NOTE — H&P
[facility]  History & Physical    Patient Name: Abbey Hagan  MRN: [id number]  Patient Class: IP- Inpatient  Admission Date: 9/28/2024  Attending Physician: Corin Angulo MD   Primary Care Provider: Noy Michaud MD         Patient information was obtained from patient, past medical records, and ER records.     Subjective:     Principal Problem:Labial abscess    Chief Complaint:   Chief Complaint   Patient presents with    Rectal Bleeding     Cyst on rectum         HPI: Abbey Hagan he is a 69-year-old female who presents emergency room for evaluation of rectal pain.  She reports rectal pain for the past several weeks.  She also endorses a possible rectal cyst with possible drainage.  She has not started any health care at this time.  She denies any fever chills.  No known sick contacts or travel.  No aggravating alleviating factors.  Previous medical history includes hypertension, COPD, former smoker.  ER workup: CBC with leukocytosis 27,000.  CMP with sodium 128, potassium 3.8, albumin 2.9 and bilirubin 1.5.  CT pelvis demonstrates and Approximately 4.5 x 0.5 x 2.5 cm gas and fluid containing abscess in right labia majora/perineum.  General surgery and gyn were consulted.  Patient will be made NPO after midnight tonight.  Patient was given vancomycin Levaquin and Flagyl.  Admitted to Hospital Medicine.            Past Medical History:   Diagnosis Date    Anxiety     Arthritis     back, neck and knees    Depression     Emphysema of lung     HSV infection     Hypertension        Past Surgical History:   Procedure Laterality Date    COLONOSCOPY N/A 10/26/2020    Procedure: COLONOSCOPY;  Surgeon: Gerard Hernandez MD;  Location: [facility];  Service: Endoscopy;  Laterality: N/A;    HIP ARTHROPLASTY      bilat    JOINT REPLACEMENT      bilat hip    TRANSFORAMINAL EPIDURAL INJECTION OF STEROID Bilateral 4/6/2021    Procedure: Injection,steroid,epidural,transforaminal  approach;  Surgeon: Scott Barger MD;  Location: [facility] OR;  Service: Pain Management;  Laterality: Bilateral;  L4-5       Review of patient's allergies indicates:   Allergen Reactions    Penicillin g      Childhood allergy--unknown reaction    Sulfa (sulfonamide antibiotics)      Childhood allergy--unknown reaction       No current facility-administered medications on file prior to encounter.     Current Outpatient Medications on File Prior to Encounter   Medication Sig    acetaminophen (TYLENOL 8 HOUR ORAL) Take by mouth.    albuterol (VENTOLIN HFA) 90 mcg/actuation inhaler Inhale 2 puffs into the lungs every 4 (four) hours as needed for Wheezing or Shortness of Breath. Rescue    albuterol-ipratropium (DUO-NEB) 2.5 mg-0.5 mg/3 mL nebulizer solution Take 3 mLs by nebulization every 6 (six) hours as needed for Wheezing or Shortness of Breath. Rescue    ascorbic acid, vitamin C, (VITAMIN C) 100 MG tablet Take 100 mg by mouth once daily.    CHOLECALCIFEROL, VITAMIN D3, (VITAMIN D-3 ORAL) No Sig Provided    EScitalopram oxalate (LEXAPRO) 20 MG tablet TAKE 1 TABLET(20 MG) BY MOUTH EVERY DAY    fluticasone-umeclidin-vilanter (TRELEGY ELLIPTA) 100-62.5-25 mcg DsDv Inhale 1 puff into the lungs once daily.    gabapentin (NEURONTIN) 300 MG capsule Take 2 capsules (600 mg total) by mouth every evening.    lisinopriL-hydrochlorothiazide (PRINZIDE,ZESTORETIC) 10-12.5 mg per tablet TAKE 1 TABLET BY MOUTH EVERY DAY    multivitamin capsule Take 1 capsule by mouth once daily.    POTASSIUM/MAGNESIUM (MAGNESIUM-POTASSIUM ORAL) Take 1 tablet by mouth once daily. Every day    predniSONE (DELTASONE) 20 MG tablet Take 1 tablet (20 mg total) by mouth once daily. Take one daily for 5 days, repeat for breathing problems.    vitamin B complex (B COMPLEX 1 ORAL) Take by mouth.     Family History       Problem Relation (Age of Onset)    Alcohol abuse Father    Breast cancer Paternal Aunt    COPD Mother    Colon cancer Maternal Grandmother     Depression Sister    Heart disease Mother    Hypertension Mother, Sister          Tobacco Use    Smoking status: Former     Current packs/day: 0.00     Average packs/day: 0.5 packs/day for 40.0 years (20.0 ttl pk-yrs)     Types: Cigarettes     Start date: 9/28/1981     Quit date: 9/28/2021     Years since quitting: 3.0    Smokeless tobacco: Never    Tobacco comments:     Patient down to 8 cigs a day   Substance and Sexual Activity    Alcohol use: Yes     Comment: socially    Drug use: Yes     Types: Oxycodone, Marijuana    Sexual activity: Not Currently     Review of Systems   Constitutional:  Positive for activity change and fever. Negative for chills and diaphoresis.   HENT:  Negative for congestion, nosebleeds and tinnitus.    Eyes:  Negative for photophobia and visual disturbance.   Respiratory:  Negative for cough, chest tightness, shortness of breath and wheezing.    Cardiovascular:  Negative for chest pain, palpitations and leg swelling.   Gastrointestinal:  Positive for rectal pain. Negative for abdominal distention, abdominal pain, constipation, diarrhea, nausea and vomiting.   Endocrine: Negative for cold intolerance and heat intolerance.   Genitourinary:  Negative for difficulty urinating, dysuria, frequency, hematuria and urgency.   Musculoskeletal:  Negative for arthralgias, back pain and myalgias.   Skin:  Negative for pallor, rash and wound.   Allergic/Immunologic: Negative for immunocompromised state.   Neurological:  Negative for dizziness, tremors, facial asymmetry, speech difficulty and weakness.   Hematological:  Negative for adenopathy. Does not bruise/bleed easily.   Psychiatric/Behavioral:  Negative for confusion and sleep disturbance. The patient is not nervous/anxious.      Objective:     Vital Signs (Most Recent):  Temp: 98 °F (36.7 °C) (09/28/24 1746)  Pulse: 76 (09/28/24 2231)  Resp: 18 (09/28/24 1746)  BP: (!) 110/57 (09/28/24 2131)  SpO2: 97 % (09/28/24 2231) Vital Signs (24h  Range):  Temp:  [98 °F (36.7 °C)] 98 °F (36.7 °C)  Pulse:  [76-88] 76  Resp:  [18] 18  SpO2:  [95 %-97 %] 97 %  BP: (102-112)/(57-73) 110/57     Weight: 76.2 kg (167 lb 15.9 oz)  Body mass index is 24.81 kg/m².     Physical Exam  Vitals and nursing note reviewed.   Constitutional:       General: She is not in acute distress.     Appearance: She is well-developed. She is ill-appearing. She is not diaphoretic.   HENT:      Head: Normocephalic.      Mouth/Throat:      Mouth: Mucous membranes are dry.   Eyes:      General: No scleral icterus.     Conjunctiva/sclera: Conjunctivae normal.      Pupils: Pupils are equal, round, and reactive to light.   Neck:      Vascular: No JVD.   Cardiovascular:      Rate and Rhythm: Regular rhythm. Tachycardia present.      Heart sounds: Normal heart sounds. No murmur heard.     No friction rub. No gallop.   Pulmonary:      Effort: Pulmonary effort is normal. No respiratory distress.      Breath sounds: Normal breath sounds. No wheezing or rales.   Abdominal:      General: Bowel sounds are normal. There is no distension.      Palpations: Abdomen is soft.      Tenderness: There is no abdominal tenderness. There is no guarding or rebound.   Genitourinary:      Musculoskeletal:         General: No tenderness. Normal range of motion.      Cervical back: Normal range of motion and neck supple.   Lymphadenopathy:      Cervical: No cervical adenopathy.   Skin:     General: Skin is warm and dry.      Capillary Refill: Capillary refill takes less than 2 seconds.      Coloration: Skin is not pale.      Findings: No erythema or rash.   Neurological:      Mental Status: She is alert and oriented to person, place, and time.      Cranial Nerves: No cranial nerve deficit.      Sensory: No sensory deficit.      Coordination: Coordination normal.      Deep Tendon Reflexes: Reflexes normal.   Psychiatric:         Behavior: Behavior normal.         Thought Content: Thought content normal.          "Judgment: Judgment normal.              CRANIAL NERVES     CN III, IV, VI   Pupils are equal, round, and reactive to light.       Significant Labs: All pertinent labs within the past 24 hours have been reviewed.  Blood Culture: No results for input(s): "LABBLOO" in the last 48 hours.  CBC:   Recent Labs   Lab 09/28/24  1839   WBC 26.86*   HGB 13.2   HCT 38.2        CMP:   Recent Labs   Lab 09/28/24  1839   *   K 3.0*   CL 82*   CO2 28   *   BUN 11   CREATININE 0.7   CALCIUM 8.9   PROT 6.3   ALBUMIN 2.9*   BILITOT 1.5*   ALKPHOS 143*   AST 28   ALT 23   ANIONGAP 18*     Lactic Acid:   Recent Labs   Lab 09/28/24  2204   LACTATE 1.0       Significant Imaging: I have reviewed all pertinent imaging results/findings within the past 24 hours.    CT:   IMPRESSION:   1. Approximately 4.5 x 0.5 x 2.5 cm gas and fluid containing abscess in right labia majora/perineum. 2. Colonic diverticulosis without evidence of diverticulitis. 3. 5.5 cm simple appearing cystic structure in left hemipelvis, likely an adnexal cyst. Recommend nonemergent ultrasound.  Assessment/Plan:     * Labial abscess  Acute problem   NPO after midnight   Appreciate recommendations from gyn   Levaquin   Flagyl   Vancomycin         Essential hypertension  Chronic, controlled. Latest blood pressure and vitals reviewed-     Temp:  [98 °F (36.7 °C)]   Pulse:  [76-88]   Resp:  [18]   BP: (102-112)/(57-73)   SpO2:  [95 %-97 %] .   Home meds for hypertension were reviewed and noted below.   Hypertension Medications               lisinopriL-hydrochlorothiazide (PRINZIDE,ZESTORETIC) 10-12.5 mg per tablet TAKE 1 TABLET BY MOUTH EVERY DAY            While in the hospital, will manage blood pressure as follows; Continue home antihypertensive regimen    Will utilize p.r.n. blood pressure medication only if patient's blood pressure greater than 140/90 and she develops symptoms such as worsening chest pain or shortness of breath.      VTE Risk " Mitigation (From admission, onward)           Ordered     Place CARLOTTA hose  Until discontinued         09/28/24 2210     IP VTE LOW RISK PATIENT  Once         09/28/24 2210     Place sequential compression device  Until discontinued         09/28/24 2210                                    Kiet Almodovar NP  Department of Hospital Medicine  [facility]

## 2024-09-29 NOTE — HPI
Abbey solomon is a 69-year-old female who presents emergency room for evaluation of rectal pain.  She reports rectal pain for the past several weeks.  She also endorses a possible rectal cyst with possible drainage.  She has not started any health care at this time.  She denies any fever chills.  No known sick contacts or travel.  No aggravating alleviating factors.  Previous medical history includes hypertension, COPD, former smoker.  ER workup: CBC with leukocytosis 27,000.  CMP with sodium 128, potassium 3.8, albumin 2.9 and bilirubin 1.5.  CT pelvis demonstrates and Approximately 4.5 x 0.5 x 2.5 cm gas and fluid containing abscess in right labia majora/perineum.  General surgery and gyn were consulted.  Patient will be made NPO after midnight tonight.  Patient was given vancomycin Levaquin and Flagyl.  Admitted to Hospital Medicine.

## 2024-09-29 NOTE — PROGRESS NOTES
[facility]  Progress Note    Patient Name: Abbey Hagan  MRN: [id number]  Patient Class: IP- Inpatient   Admission Date: 9/28/2024  Length of Stay: 1 days  Attending Physician: Corin Angulo MD  Primary Care Provider: Noy Michaud MD        Subjective:     Principal Problem:Labial abscess        HPI:  Abbey Hagan he is a 69-year-old female who presents emergency room for evaluation of rectal pain.  She reports rectal pain for the past several weeks.  She also endorses a possible rectal cyst with possible drainage.  She has not started any health care at this time.  She denies any fever chills.  No known sick contacts or travel.  No aggravating alleviating factors.  Previous medical history includes hypertension, COPD, former smoker.  ER workup: CBC with leukocytosis 27,000.  CMP with sodium 128, potassium 3.8, albumin 2.9 and bilirubin 1.5.  CT pelvis demonstrates and Approximately 4.5 x 0.5 x 2.5 cm gas and fluid containing abscess in right labia majora/perineum.  General surgery and gyn were consulted.  Patient will be made NPO after midnight tonight.  Patient was given vancomycin Levaquin and Flagyl.  Admitted to Hospital Medicine.            Overview/Hospital Course:  No notes on file    Interval History:  Patient seen and examined.  Main complaint at this time is pain at the site of the abscess.  She reports having a bowel movement this morning.  She is NPO and gynecology is consulted.    Review of Systems   Respiratory:  Negative for shortness of breath.    Cardiovascular:  Negative for chest pain.     Objective:     Vital Signs (Most Recent):  Temp: 97.7 °F (36.5 °C) (09/29/24 1137)  Pulse: 75 (09/29/24 1137)  Resp: 18 (09/29/24 1137)  BP: 106/73 (09/29/24 1137)  SpO2: (!) 94 % (09/29/24 1137) Vital Signs (24h Range):  Temp:  [97.6 °F (36.4 °C)-98 °F (36.7 °C)] 97.7 °F (36.5 °C)  Pulse:  [73-93] 75  Resp:  [15-21] 18  SpO2:  [92 %-97 %] 94 %  BP:  ()/(53-74) 106/73     Weight: 70.7 kg (155 lb 13.8 oz)  Body mass index is 23.02 kg/m².    Intake/Output Summary (Last 24 hours) at 9/29/2024 1247  Last data filed at 9/29/2024 0514  Gross per 24 hour   Intake 257.06 ml   Output 100 ml   Net 157.06 ml         Physical Exam  Constitutional:       General: She is not in acute distress.     Appearance: She is well-developed. She is ill-appearing.   HENT:      Head: Normocephalic and atraumatic.   Eyes:      Pupils: Pupils are equal, round, and reactive to light.   Cardiovascular:      Rate and Rhythm: Normal rate and regular rhythm.      Heart sounds: No murmur heard.  Pulmonary:      Effort: Pulmonary effort is normal. No respiratory distress.      Breath sounds: Normal breath sounds. No wheezing or rales.   Abdominal:      General: Bowel sounds are normal. There is no distension.      Palpations: Abdomen is soft.      Tenderness: There is no abdominal tenderness.   Genitourinary:     Comments: Exam deferred  Musculoskeletal:         General: Normal range of motion.   Skin:     General: Skin is warm and dry.      Findings: No rash.   Neurological:      Mental Status: She is alert and oriented to person, place, and time.      Cranial Nerves: No cranial nerve deficit.   Psychiatric:         Behavior: Behavior normal.             Significant Labs: All pertinent labs within the past 24 hours have been reviewed.    Significant Imaging: I have reviewed all pertinent imaging results/findings within the past 24 hours.    Assessment/Plan:      * Labial abscess  Acute problem   NPO   Appreciate recommendations from gyn   Levaquin   Flagyl   Vancomycin         Essential hypertension  Chronic, controlled. Latest blood pressure and vitals reviewed-     Temp:  [97.6 °F (36.4 °C)-98 °F (36.7 °C)]   Pulse:  [73-93]   Resp:  [15-21]   BP: ()/(53-74)   SpO2:  [92 %-97 %] .   Home meds for hypertension were reviewed and noted below.   Hypertension Medications                lisinopriL-hydrochlorothiazide (PRINZIDE,ZESTORETIC) 10-12.5 mg per tablet TAKE 1 TABLET BY MOUTH EVERY DAY            While in the hospital, will manage blood pressure as follows; Continue home antihypertensive regimen    Will utilize p.r.n. blood pressure medication only if patient's blood pressure greater than 140/90 and she develops symptoms such as worsening chest pain or shortness of breath.      VTE Risk Mitigation (From admission, onward)           Ordered     Place CARLOTTA hose  Until discontinued         09/28/24 2210     IP VTE LOW RISK PATIENT  Once         09/28/24 2210     Place sequential compression device  Until discontinued         09/28/24 2210                    Discharge Planning   NEDA:      Code Status: Full Code   Is the patient medically ready for discharge?:     Reason for patient still in hospital (select all that apply): Patient trending condition and Treatment  Discharge Plan A: Home                  Corin Angulo MD  Department of Hospital Medicine   [facility]/Surg

## 2024-09-29 NOTE — PLAN OF CARE
Problem: Adult Inpatient Plan of Care  Goal: Plan of Care Review  Outcome: Progressing  Goal: Patient-Specific Goal (Individualized)  Outcome: Progressing  Goal: Absence of Hospital-Acquired Illness or Injury  Outcome: Progressing  Goal: Optimal Comfort and Wellbeing  Outcome: Progressing  Goal: Readiness for Transition of Care  Outcome: Progressing     Problem: Infection  Goal: Absence of Infection Signs and Symptoms  Outcome: Progressing     Problem: Wound  Goal: Optimal Coping  Outcome: Progressing  Goal: Optimal Functional Ability  Outcome: Progressing  Goal: Absence of Infection Signs and Symptoms  Outcome: Progressing  Goal: Improved Oral Intake  Outcome: Progressing  Goal: Optimal Pain Control and Function  Outcome: Progressing  Goal: Skin Health and Integrity  Outcome: Progressing  Goal: Optimal Wound Healing  Outcome: Progressing   Plan of care reviewed with patient. Patient verbalized understanding. All fall precautions maintained. Bed in lowest position, call light within reach, slip resistant socks maintained. Bed alarm on.

## 2024-09-29 NOTE — ASSESSMENT & PLAN NOTE
Chronic, controlled. Latest blood pressure and vitals reviewed-     Temp:  [98 °F (36.7 °C)]   Pulse:  [76-88]   Resp:  [18]   BP: (102-112)/(57-73)   SpO2:  [95 %-97 %] .   Home meds for hypertension were reviewed and noted below.   Hypertension Medications               lisinopriL-hydrochlorothiazide (PRINZIDE,ZESTORETIC) 10-12.5 mg per tablet TAKE 1 TABLET BY MOUTH EVERY DAY            While in the hospital, will manage blood pressure as follows; Continue home antihypertensive regimen    Will utilize p.r.n. blood pressure medication only if patient's blood pressure greater than 140/90 and she develops symptoms such as worsening chest pain or shortness of breath.

## 2024-09-29 NOTE — ASSESSMENT & PLAN NOTE
Chronic, controlled. Latest blood pressure and vitals reviewed-     Temp:  [97.6 °F (36.4 °C)-98 °F (36.7 °C)]   Pulse:  [73-93]   Resp:  [15-21]   BP: ()/(53-74)   SpO2:  [92 %-97 %] .   Home meds for hypertension were reviewed and noted below.   Hypertension Medications               lisinopriL-hydrochlorothiazide (PRINZIDE,ZESTORETIC) 10-12.5 mg per tablet TAKE 1 TABLET BY MOUTH EVERY DAY            While in the hospital, will manage blood pressure as follows; Continue home antihypertensive regimen    Will utilize p.r.n. blood pressure medication only if patient's blood pressure greater than 140/90 and she develops symptoms such as worsening chest pain or shortness of breath.

## 2024-09-29 NOTE — SUBJECTIVE & OBJECTIVE
Interval History:  Patient seen and examined.  Main complaint at this time is pain at the site of the abscess.  She reports having a bowel movement this morning.  She is NPO and gynecology is consulted.    Review of Systems   Respiratory:  Negative for shortness of breath.    Cardiovascular:  Negative for chest pain.     Objective:     Vital Signs (Most Recent):  Temp: 97.7 °F (36.5 °C) (09/29/24 1137)  Pulse: 75 (09/29/24 1137)  Resp: 18 (09/29/24 1137)  BP: 106/73 (09/29/24 1137)  SpO2: (!) 94 % (09/29/24 1137) Vital Signs (24h Range):  Temp:  [97.6 °F (36.4 °C)-98 °F (36.7 °C)] 97.7 °F (36.5 °C)  Pulse:  [73-93] 75  Resp:  [15-21] 18  SpO2:  [92 %-97 %] 94 %  BP: ()/(53-74) 106/73     Weight: 70.7 kg (155 lb 13.8 oz)  Body mass index is 23.02 kg/m².    Intake/Output Summary (Last 24 hours) at 9/29/2024 1247  Last data filed at 9/29/2024 0514  Gross per 24 hour   Intake 257.06 ml   Output 100 ml   Net 157.06 ml         Physical Exam  Constitutional:       General: She is not in acute distress.     Appearance: She is well-developed. She is ill-appearing.   HENT:      Head: Normocephalic and atraumatic.   Eyes:      Pupils: Pupils are equal, round, and reactive to light.   Cardiovascular:      Rate and Rhythm: Normal rate and regular rhythm.      Heart sounds: No murmur heard.  Pulmonary:      Effort: Pulmonary effort is normal. No respiratory distress.      Breath sounds: Normal breath sounds. No wheezing or rales.   Abdominal:      General: Bowel sounds are normal. There is no distension.      Palpations: Abdomen is soft.      Tenderness: There is no abdominal tenderness.   Genitourinary:     Comments: Exam deferred  Musculoskeletal:         General: Normal range of motion.   Skin:     General: Skin is warm and dry.      Findings: No rash.   Neurological:      Mental Status: She is alert and oriented to person, place, and time.      Cranial Nerves: No cranial nerve deficit.   Psychiatric:         Behavior:  Behavior normal.             Significant Labs: All pertinent labs within the past 24 hours have been reviewed.    Significant Imaging: I have reviewed all pertinent imaging results/findings within the past 24 hours.

## 2024-09-29 NOTE — NURSING
Awake, alert and oriented x4. Reviewed room/unit assigned. Questions answered and encouraged. 20g to lt a/c with site free of s/s of infiltration. /57, 74 HR, 95% RA.   
Informed monitor tech's of patient's arrival to room 222, patient placed on tele monitor #[id number].   Report given to DUSTIN Arteaga.   
Nurses Note -- 4 Eyes      9/29/2024   3:54 AM      Skin assessed during: Admit      [] No Altered Skin Integrity Present    []Prevention Measures Documented      [x] Yes- Altered Skin Integrity Present or Discovered   [] LDA Added if Not in Epic (Describe Wound)   [x] New Altered Skin Integrity was Present on Admit and Documented in LDA   [] Wound Image Taken    Wound Care Consulted? Yes    Attending Nurse:  Jesenia Bennett RN/Staff Member:   Batsheva         
Patient arrived from step down to room 322 via bed. Oriented to room. VS stable. Call light at bedside, bed alarm in use.   
no

## 2024-09-30 DIAGNOSIS — N83.209 CYST OF OVARY, UNSPECIFIED LATERALITY: Primary | ICD-10-CM

## 2024-09-30 LAB
ALBUMIN SERPL BCP-MCNC: 2.3 G/DL (ref 3.5–5.2)
ALP SERPL-CCNC: 114 U/L (ref 55–135)
ALT SERPL W/O P-5'-P-CCNC: 15 U/L (ref 10–44)
ANION GAP SERPL CALC-SCNC: 11 MMOL/L (ref 8–16)
AST SERPL-CCNC: 26 U/L (ref 10–40)
BASOPHILS # BLD AUTO: 0.05 K/UL (ref 0–0.2)
BASOPHILS NFR BLD: 0.3 % (ref 0–1.9)
BILIRUB SERPL-MCNC: 0.8 MG/DL (ref 0.1–1)
BUN SERPL-MCNC: 8 MG/DL (ref 8–23)
CALCIUM SERPL-MCNC: 7.8 MG/DL (ref 8.7–10.5)
CHLORIDE SERPL-SCNC: 86 MMOL/L (ref 95–110)
CO2 SERPL-SCNC: 30 MMOL/L (ref 23–29)
CREAT SERPL-MCNC: 0.6 MG/DL (ref 0.5–1.4)
DIFFERENTIAL METHOD BLD: ABNORMAL
EOSINOPHIL # BLD AUTO: 0.1 K/UL (ref 0–0.5)
EOSINOPHIL NFR BLD: 0.5 % (ref 0–8)
ERYTHROCYTE [DISTWIDTH] IN BLOOD BY AUTOMATED COUNT: 12.8 % (ref 11.5–14.5)
EST. GFR  (NO RACE VARIABLE): >60 ML/MIN/1.73 M^2
GLUCOSE SERPL-MCNC: 97 MG/DL (ref 70–110)
HCT VFR BLD AUTO: 31.4 % (ref 37–48.5)
HGB BLD-MCNC: 10.7 G/DL (ref 12–16)
IMM GRANULOCYTES # BLD AUTO: 0.41 K/UL (ref 0–0.04)
IMM GRANULOCYTES NFR BLD AUTO: 2.7 % (ref 0–0.5)
LYMPHOCYTES # BLD AUTO: 1.4 K/UL (ref 1–4.8)
LYMPHOCYTES NFR BLD: 8.8 % (ref 18–48)
MAGNESIUM SERPL-MCNC: 1.8 MG/DL (ref 1.6–2.6)
MCH RBC QN AUTO: 29.6 PG (ref 27–31)
MCHC RBC AUTO-ENTMCNC: 34.1 G/DL (ref 32–36)
MCV RBC AUTO: 87 FL (ref 82–98)
MONOCYTES # BLD AUTO: 1.6 K/UL (ref 0.3–1)
MONOCYTES NFR BLD: 10.3 % (ref 4–15)
NEUTROPHILS # BLD AUTO: 11.9 K/UL (ref 1.8–7.7)
NEUTROPHILS NFR BLD: 77.4 % (ref 38–73)
NRBC BLD-RTO: 0 /100 WBC
PHOSPHATE SERPL-MCNC: 3.6 MG/DL (ref 2.7–4.5)
PLATELET # BLD AUTO: 211 K/UL (ref 150–450)
PMV BLD AUTO: 9.8 FL (ref 9.2–12.9)
POTASSIUM SERPL-SCNC: 3.5 MMOL/L (ref 3.5–5.1)
PROT SERPL-MCNC: 4.8 G/DL (ref 6–8.4)
RBC # BLD AUTO: 3.62 M/UL (ref 4–5.4)
SODIUM SERPL-SCNC: 127 MMOL/L (ref 136–145)
VANCOMYCIN TROUGH SERPL-MCNC: 12.2 UG/ML (ref 10–22)
WBC # BLD AUTO: 15.4 K/UL (ref 3.9–12.7)

## 2024-09-30 PROCEDURE — 99900035 HC TECH TIME PER 15 MIN (STAT)

## 2024-09-30 PROCEDURE — 25000003 PHARM REV CODE 250: Performed by: HOSPITALIST

## 2024-09-30 PROCEDURE — 63600175 PHARM REV CODE 636 W HCPCS: Performed by: HOSPITALIST

## 2024-09-30 PROCEDURE — 25000003 PHARM REV CODE 250: Performed by: NURSE PRACTITIONER

## 2024-09-30 PROCEDURE — 36415 COLL VENOUS BLD VENIPUNCTURE: CPT | Performed by: NURSE PRACTITIONER

## 2024-09-30 PROCEDURE — 85025 COMPLETE CBC W/AUTO DIFF WBC: CPT | Performed by: NURSE PRACTITIONER

## 2024-09-30 PROCEDURE — 80053 COMPREHEN METABOLIC PANEL: CPT | Performed by: NURSE PRACTITIONER

## 2024-09-30 PROCEDURE — 84100 ASSAY OF PHOSPHORUS: CPT | Performed by: NURSE PRACTITIONER

## 2024-09-30 PROCEDURE — 25000242 PHARM REV CODE 250 ALT 637 W/ HCPCS: Performed by: NURSE PRACTITIONER

## 2024-09-30 PROCEDURE — 63600175 PHARM REV CODE 636 W HCPCS: Performed by: NURSE PRACTITIONER

## 2024-09-30 PROCEDURE — 94640 AIRWAY INHALATION TREATMENT: CPT

## 2024-09-30 PROCEDURE — 11000001 HC ACUTE MED/SURG PRIVATE ROOM

## 2024-09-30 PROCEDURE — 36415 COLL VENOUS BLD VENIPUNCTURE: CPT | Performed by: HOSPITALIST

## 2024-09-30 PROCEDURE — 83735 ASSAY OF MAGNESIUM: CPT | Performed by: NURSE PRACTITIONER

## 2024-09-30 PROCEDURE — 80202 ASSAY OF VANCOMYCIN: CPT | Performed by: HOSPITALIST

## 2024-09-30 PROCEDURE — 94761 N-INVAS EAR/PLS OXIMETRY MLT: CPT

## 2024-09-30 RX ORDER — DOXYCYCLINE HYCLATE 100 MG
100 TABLET ORAL EVERY 12 HOURS
Qty: 20 TABLET | Refills: 0 | Status: SHIPPED | OUTPATIENT
Start: 2024-09-30 | End: 2024-10-10

## 2024-09-30 RX ORDER — DOXYCYCLINE HYCLATE 100 MG
100 TABLET ORAL EVERY 12 HOURS
Status: DISCONTINUED | OUTPATIENT
Start: 2024-09-30 | End: 2024-10-01 | Stop reason: HOSPADM

## 2024-09-30 RX ADMIN — HYDROMORPHONE HYDROCHLORIDE 1 MG: 1 INJECTION, SOLUTION INTRAMUSCULAR; INTRAVENOUS; SUBCUTANEOUS at 03:09

## 2024-09-30 RX ADMIN — METRONIDAZOLE 500 MG: 5 INJECTION, SOLUTION INTRAVENOUS at 02:09

## 2024-09-30 RX ADMIN — Medication 800 MG: at 02:09

## 2024-09-30 RX ADMIN — METRONIDAZOLE 500 MG: 5 INJECTION, SOLUTION INTRAVENOUS at 09:09

## 2024-09-30 RX ADMIN — ACETAMINOPHEN 650 MG: 325 TABLET ORAL at 01:09

## 2024-09-30 RX ADMIN — POTASSIUM BICARBONATE 50 MEQ: 977.5 TABLET, EFFERVESCENT ORAL at 09:09

## 2024-09-30 RX ADMIN — DOXYCYCLINE HYCLATE 100 MG: 100 TABLET, COATED ORAL at 09:09

## 2024-09-30 RX ADMIN — IPRATROPIUM BROMIDE AND ALBUTEROL SULFATE 3 ML: .5; 3 SOLUTION RESPIRATORY (INHALATION) at 07:09

## 2024-09-30 RX ADMIN — Medication 800 MG: at 09:09

## 2024-09-30 RX ADMIN — VANCOMYCIN HYDROCHLORIDE 1000 MG: 1 INJECTION, POWDER, LYOPHILIZED, FOR SOLUTION INTRAVENOUS at 10:09

## 2024-09-30 NOTE — CONSULTS
HISTORY OF THE PRESENT ILLNESS    2024  Abbey Hagan is a 69 y.o. yo  who presented to the ER 24 with what she thought was rectal bleeding.  She had pain and swelling on her bottom.  She was found to have a draining vulvar abscess.  Was started on IV Antibiotics.    This morning she reports feeling much better.  Says she's regaining her strength.  Her bottom doesn't hurt as much.  Continues to note drainage in her underwear.    G'sP's:   LMP: age 39  Relationship: single and not having sex  PAP Hx: no h/o abnormals  LAST PAP: PAP neg / HPV neg / Date: 2014  MMG (24) = negative  HRT:  used for about 1 year only at menopause but not since   BLEEDING: denies       PHYSICAL EXAM  Vitals:    24 0357   BP: 117/69   Pulse: 63   Resp: 16   Temp: 98 °F (36.7 °C)     GEN = alert/oriented, nad, cheerful   =      External: nefg, no lesions, right labia is draining blood-mucous discharge, no fullness inferior to small skin opening, fullness supero-laterally this morning is even smaller than it was last evening, no erythema / edema / crepitus    LABS & RADS     Recent Labs   Lab 24  1839 24  0433 24  0404   WBC 26.86* 19.47* 15.40*   HGB 13.2 11.9* 10.7*   HCT 38.2 34.3* 31.4*    223 211   MCV 86 86 87     CT ABD PELVIS (24) =  There is a 7.0 x 2.0 cm abscess in the right labia.     The there is a 6.3 cm low-attenuation lesion seen within the left adnexa possibly representing cyst.  Nonemergent pelvic ultrasound for further evaluation is recommended.     There is thrombosis of the deep right pelvic vein.       ASSESSMENT AND PLAN:  69 y.o.  with draining right vulvar abscess.  No surrounding cellulitis.  Patient improving clinically.  Leukocytosis resolving.  Incidental finding of 6cm left adnexal cyst and thrombosis of deep right pelvic vein.    Continue antibiotics (currently receiving levaquin 500 q24h / flagyl 500 q8h / vanc 1g BID) -  would transition to outpatient regimen for total 14 days  Encouraged patient to massage area couple times a day to encourage continued drainage  Would encourage use of peribottle if available  Defer to Hospitalist service whether anticoagulation is warranted for thrombosis of deep pelvic vein  Will see patient as outpatient for f/u of ovarian cyst and complete pelvic exam, cervical CA screening.    MD MAYI

## 2024-09-30 NOTE — PLAN OF CARE
Pt is agreeable to home health with [facility]. Referral sent  Hospital follow up scheduled  Pt was referred to OP      09/30/24 2328   Post-Acute Status   Post-Acute Authorization Home Health   Home Health Status Referrals Sent   Hospital Resources/Appts/Education Provided Appointments scheduled and added to AVS

## 2024-09-30 NOTE — PLAN OF CARE
No acute distress noted, PT consulted, tele monitor in place, see flow sheet for full assessment, safety measures in place    Problem: Adult Inpatient Plan of Care  Goal: Plan of Care Review  Outcome: Progressing     Problem: Adult Inpatient Plan of Care  Goal: Absence of Hospital-Acquired Illness or Injury  Outcome: Progressing     Problem: Adult Inpatient Plan of Care  Goal: Optimal Comfort and Wellbeing  Outcome: Progressing     Problem: Infection  Goal: Absence of Infection Signs and Symptoms  Outcome: Progressing     Problem: Wound  Goal: Absence of Infection Signs and Symptoms  Outcome: Progressing     Problem: Wound  Goal: Improved Oral Intake  Outcome: Progressing     Problem: Skin Injury Risk Increased  Goal: Skin Health and Integrity  Outcome: Progressing

## 2024-09-30 NOTE — PROGRESS NOTES
Pharmacokinetic Assessment Follow Up: IV Vancomycin    Vancomycin serum concentration assessment(s):    The trough level was drawn correctly and can be used to guide therapy at this time. The measurement is within the desired definitive target range of 10 to 15 mcg/mL.    Vancomycin Regimen Plan:    Continue regimen to Vancomycin 1000 mg IV every 12 hours with next serum trough concentration measured at 2030 prior to 4th dose on 10/1    Drug levels (last 3 results):  Recent Labs   Lab Result Units 09/30/24  0822   Vancomycin-Trough ug/mL 12.2       Pharmacy will continue to follow and monitor vancomycin.    Please contact pharmacy at extension [phone] for questions regarding this assessment.    Thank you for the consult,   Chinyere Bonilla       Patient brief summary:  Abbey Hagan is a 69 y.o. female initiated on antimicrobial therapy with IV Vancomycin for treatment of skin & soft tissue infection    The patient's current regimen is 1000 mg every 12 hours    Drug Allergies:   Review of patient's allergies indicates:   Allergen Reactions    Penicillin g      Childhood allergy--unknown reaction    Sulfa (sulfonamide antibiotics)      Childhood allergy--unknown reaction       Actual Body Weight:   70.7    Renal Function:   Estimated Creatinine Clearance: 93.8 mL/min (based on SCr of 0.6 mg/dL).,     Dialysis Method (if applicable):  N/A    CBC (last 72 hours):  Recent Labs   Lab Result Units 09/28/24 1839 09/29/24 0433 09/30/24  0404   WBC K/uL 26.86* 19.47* 15.40*   Hemoglobin g/dL 13.2 11.9* 10.7*   Hematocrit % 38.2 34.3* 31.4*   Platelets K/uL 239 223 211   Gran % % 91.0* 89.3* 77.4*   Lymph % % 2.0* 3.5* 8.8*   Mono % % 4.0 4.1 10.3   Eosinophil % % 0.0 0.1 0.5   Basophil % % 0.0 0.4 0.3   Differential Method  Manual Automated Automated       Metabolic Panel (last 72 hours):  Recent Labs   Lab Result Units 09/28/24 1839 09/29/24 0433 09/30/24  0404   Sodium mmol/L 128* 128* 127*   Potassium mmol/L 3.0* 3.7  3.5   Chloride mmol/L 82* 86* 86*   CO2 mmol/L 28 30* 30*   Glucose mg/dL 119* 96 97   BUN mg/dL 11 11 8   Creatinine mg/dL 0.7 0.6 0.6   Albumin g/dL 2.9* 2.6* 2.3*   Total Bilirubin mg/dL 1.5* 1.2* 0.8   Alkaline Phosphatase U/L 143* 138* 114   AST U/L 28 24 26   ALT U/L 23 19 15   Magnesium mg/dL  --  1.7 1.8   Phosphorus mg/dL  --  2.4* 3.6       Vancomycin Administrations:  vancomycin given in the last 96 hours                     vancomycin (VANCOCIN) 1,000 mg in D5W 250 mL IVPB (admixture device) (mg) 1,000 mg New Bag 09/29/24 2124     1,000 mg New Bag  0836    vancomycin 1,500 mg in D5W 250 mL IVPB (admixture device) (mg) 1,500 mg New Bag 09/28/24 2123                    Microbiologic Results:  Microbiology Results (last 7 days)       Procedure Component Value Units Date/Time    Blood culture [[id number]] Collected: 09/28/24 2204    Order Status: Completed Specimen: Blood from Peripheral, Antecubital, Right Updated: 09/29/24 1717     Blood Culture, Routine No Growth to date    Blood culture [[id number]] Collected: 09/28/24 2204    Order Status: Completed Specimen: Blood from Peripheral, Antecubital, Left Updated: 09/29/24 1717     Blood Culture, Routine No Growth to date

## 2024-09-30 NOTE — PLAN OF CARE
Home health order sent to [facility]. 1st visit tomorrow 10/1  Meals on wheels and food pantry resources provided    Pt is clear for dc from        09/30/24 1344   Final Note   Assessment Type Final Discharge Note   Anticipated Discharge Disposition Home-Health   Hospital Resources/Appts/Education Provided Appointments scheduled and added to AVS;Post-Acute resouces added to AVS;Community resources provided   Post-Acute Status   Post-Acute Authorization Home Health   Home Health Status Set-up Complete/Auth obtained

## 2024-09-30 NOTE — PLAN OF CARE
POC reviewed with pt, verbalized understanding. Pt AAO x 4, able to make needs known. Meds given per MAR. PRNs to maintain comfort. IV intact and patent. Purposeful q2hr rounding completed. Safety maintained with side rails up x3, bed wheels locked, bed in lowest position, and call light in reach. Pt educated to call for assistance with ambulation and continue to wear nonslip socks; pt verbalized understanding. Pt remains free of falls. No further needs expressed at this time.     Problem: Adult Inpatient Plan of Care  Goal: Plan of Care Review  Outcome: Progressing     Problem: Adult Inpatient Plan of Care  Goal: Optimal Comfort and Wellbeing  Outcome: Progressing     Problem: Wound  Goal: Optimal Coping  Outcome: Progressing

## 2024-09-30 NOTE — CARE UPDATE
09/30/24 0732   Patient Assessment/Suction   Level of Consciousness (AVPU) alert   Respiratory Effort Unlabored   Expansion/Accessory Muscles/Retractions no use of accessory muscles;no retractions;expansion symmetric   RUL Breath Sounds wheezes, expiratory   Rhythm/Pattern, Respiratory unlabored;pattern regular;depth regular;no shortness of breath reported   PRE-TX-O2   Device (Oxygen Therapy) room air   SpO2 96 %   Pulse Oximetry Type Intermittent   $ Pulse Oximetry - Multiple Charge Pulse Oximetry - Multiple   Pulse 68   Resp 16   Aerosol Therapy   $ Aerosol Therapy Charges Aerosol Treatment   Respiratory Treatment Status (SVN) given   Treatment Route (SVN) mask;oxygen   Patient Position HOB elevated   Post Treatment Assessment (SVN) breath sounds improved;increased aeration   Signs of Intolerance (SVN) none   Breath Sounds Post-Respiratory Treatment   Throughout All Fields Post-Treatment All Fields   Throughout All Fields Post-Treatment aeration increased   Post-treatment Heart Rate (beats/min) 70   Post-treatment Resp Rate (breaths/min) 16

## 2024-09-30 NOTE — PLAN OF CARE
[facility] received notification that pt would like to file an appeal against her discharge.  [facility] visited the pt at bedside to review appeal process and provide the contact information for the appeal to the pt. All questions answered.    Pt filed an appeal against the discharge with Appeal ID: [id number].    Records prepared and submitted to [employer] for their review.  [facility] following.    Confirmation No:   [id number]

## 2024-09-30 NOTE — PROGRESS NOTES
Abbey Hagan [id number] is a 69 y.o. female who has been consulted for vancomycin dosing.    Pharmacy consult for vancomycin dosing is no longer required.  Vancomycin was discontinued.    Thank you for allowing us to participate in this patient's care.     Mamta Adams Mai, PharmD

## 2024-10-01 VITALS
SYSTOLIC BLOOD PRESSURE: 127 MMHG | HEIGHT: 69 IN | DIASTOLIC BLOOD PRESSURE: 63 MMHG | BODY MASS INDEX: 23.09 KG/M2 | RESPIRATION RATE: 18 BRPM | WEIGHT: 155.88 LBS | HEART RATE: 75 BPM | TEMPERATURE: 98 F | OXYGEN SATURATION: 96 %

## 2024-10-01 PROBLEM — R73.03 PREDIABETES: Status: RESOLVED | Noted: 2019-08-27 | Resolved: 2024-10-01

## 2024-10-01 PROBLEM — I82.890: Status: ACTIVE | Noted: 2024-10-01

## 2024-10-01 PROBLEM — R93.89 ABNORMAL CT SCAN: Status: RESOLVED | Noted: 2020-10-26 | Resolved: 2024-10-01

## 2024-10-01 LAB
ALBUMIN SERPL BCP-MCNC: 2.5 G/DL (ref 3.5–5.2)
ALP SERPL-CCNC: 105 U/L (ref 55–135)
ALT SERPL W/O P-5'-P-CCNC: 14 U/L (ref 10–44)
ANION GAP SERPL CALC-SCNC: 11 MMOL/L (ref 8–16)
AST SERPL-CCNC: 18 U/L (ref 10–40)
BASOPHILS # BLD AUTO: ABNORMAL K/UL (ref 0–0.2)
BASOPHILS NFR BLD: 0 % (ref 0–1.9)
BILIRUB SERPL-MCNC: 0.7 MG/DL (ref 0.1–1)
BUN SERPL-MCNC: 6 MG/DL (ref 8–23)
CALCIUM SERPL-MCNC: 8.2 MG/DL (ref 8.7–10.5)
CHLORIDE SERPL-SCNC: 91 MMOL/L (ref 95–110)
CO2 SERPL-SCNC: 28 MMOL/L (ref 23–29)
CREAT SERPL-MCNC: 0.6 MG/DL (ref 0.5–1.4)
DIFFERENTIAL METHOD BLD: ABNORMAL
EOSINOPHIL # BLD AUTO: ABNORMAL K/UL (ref 0–0.5)
EOSINOPHIL NFR BLD: 1 % (ref 0–8)
ERYTHROCYTE [DISTWIDTH] IN BLOOD BY AUTOMATED COUNT: 12.9 % (ref 11.5–14.5)
EST. GFR  (NO RACE VARIABLE): >60 ML/MIN/1.73 M^2
GLUCOSE SERPL-MCNC: 105 MG/DL (ref 70–110)
HCT VFR BLD AUTO: 34.1 % (ref 37–48.5)
HGB BLD-MCNC: 11.8 G/DL (ref 12–16)
IMM GRANULOCYTES # BLD AUTO: ABNORMAL K/UL (ref 0–0.04)
IMM GRANULOCYTES NFR BLD AUTO: ABNORMAL % (ref 0–0.5)
LYMPHOCYTES # BLD AUTO: ABNORMAL K/UL (ref 1–4.8)
LYMPHOCYTES NFR BLD: 18 % (ref 18–48)
MAGNESIUM SERPL-MCNC: 2 MG/DL (ref 1.6–2.6)
MCH RBC QN AUTO: 29.9 PG (ref 27–31)
MCHC RBC AUTO-ENTMCNC: 34.6 G/DL (ref 32–36)
MCV RBC AUTO: 87 FL (ref 82–98)
MONOCYTES # BLD AUTO: ABNORMAL K/UL (ref 0.3–1)
MONOCYTES NFR BLD: 7 % (ref 4–15)
NEUTROPHILS NFR BLD: 69 % (ref 38–73)
NEUTS BAND NFR BLD MANUAL: 5 %
NRBC BLD-RTO: 0 /100 WBC
PHOSPHATE SERPL-MCNC: 3.8 MG/DL (ref 2.7–4.5)
PLATELET # BLD AUTO: 276 K/UL (ref 150–450)
PLATELET BLD QL SMEAR: ABNORMAL
PMV BLD AUTO: 10.1 FL (ref 9.2–12.9)
POTASSIUM SERPL-SCNC: 4.4 MMOL/L (ref 3.5–5.1)
PROT SERPL-MCNC: 5.3 G/DL (ref 6–8.4)
RBC # BLD AUTO: 3.94 M/UL (ref 4–5.4)
SODIUM SERPL-SCNC: 130 MMOL/L (ref 136–145)
WBC # BLD AUTO: 14.37 K/UL (ref 3.9–12.7)

## 2024-10-01 PROCEDURE — 94761 N-INVAS EAR/PLS OXIMETRY MLT: CPT

## 2024-10-01 PROCEDURE — 85027 COMPLETE CBC AUTOMATED: CPT | Performed by: NURSE PRACTITIONER

## 2024-10-01 PROCEDURE — 83735 ASSAY OF MAGNESIUM: CPT | Performed by: NURSE PRACTITIONER

## 2024-10-01 PROCEDURE — 99232 SBSQ HOSP IP/OBS MODERATE 35: CPT | Mod: ,,, | Performed by: OBSTETRICS & GYNECOLOGY

## 2024-10-01 PROCEDURE — 84100 ASSAY OF PHOSPHORUS: CPT | Performed by: NURSE PRACTITIONER

## 2024-10-01 PROCEDURE — 25000003 PHARM REV CODE 250: Performed by: HOSPITALIST

## 2024-10-01 PROCEDURE — 97161 PT EVAL LOW COMPLEX 20 MIN: CPT

## 2024-10-01 PROCEDURE — 80053 COMPREHEN METABOLIC PANEL: CPT | Performed by: NURSE PRACTITIONER

## 2024-10-01 PROCEDURE — 99900035 HC TECH TIME PER 15 MIN (STAT)

## 2024-10-01 PROCEDURE — 36415 COLL VENOUS BLD VENIPUNCTURE: CPT | Performed by: NURSE PRACTITIONER

## 2024-10-01 PROCEDURE — 85007 BL SMEAR W/DIFF WBC COUNT: CPT | Performed by: NURSE PRACTITIONER

## 2024-10-01 PROCEDURE — 97116 GAIT TRAINING THERAPY: CPT

## 2024-10-01 RX ORDER — ACETAMINOPHEN 500 MG
500 TABLET ORAL 4 TIMES DAILY
Status: DISCONTINUED | OUTPATIENT
Start: 2024-10-01 | End: 2024-10-01 | Stop reason: HOSPADM

## 2024-10-01 RX ORDER — IBUPROFEN 600 MG/1
600 TABLET ORAL 4 TIMES DAILY
Status: DISCONTINUED | OUTPATIENT
Start: 2024-10-01 | End: 2024-10-01 | Stop reason: HOSPADM

## 2024-10-01 RX ORDER — IBUPROFEN 400 MG/1
800 TABLET ORAL EVERY 6 HOURS PRN
Status: DISCONTINUED | OUTPATIENT
Start: 2024-10-01 | End: 2024-10-01

## 2024-10-01 RX ORDER — ACETAMINOPHEN 500 MG
1000 TABLET ORAL EVERY 6 HOURS PRN
Status: DISCONTINUED | OUTPATIENT
Start: 2024-10-01 | End: 2024-10-01

## 2024-10-01 RX ADMIN — RIVAROXABAN 10 MG: 10 TABLET, FILM COATED ORAL at 04:10

## 2024-10-01 RX ADMIN — ACETAMINOPHEN 500 MG: 500 TABLET ORAL at 03:10

## 2024-10-01 RX ADMIN — DOXYCYCLINE HYCLATE 100 MG: 100 TABLET, COATED ORAL at 09:10

## 2024-10-01 RX ADMIN — IBUPROFEN 600 MG: 600 TABLET ORAL at 03:10

## 2024-10-01 NOTE — ASSESSMENT & PLAN NOTE
Seen by gyn, recommended expectant management and continuation of antibiotics.  No operative indication at this time.  Continue pain control and other supportive care.

## 2024-10-01 NOTE — ASSESSMENT & PLAN NOTE
Noted incidentally on CT scan.  Will initiate anticoagulation with rivaroxaban, and follow-up with Hematology/Oncology as outpatient.

## 2024-10-01 NOTE — PLAN OF CARE
Problem: Adult Inpatient Plan of Care  Goal: Plan of Care Review  10/1/2024 1831 by Dom Tsang LPN  Outcome: Met  10/1/2024 1556 by Dom Tsang LPN  Outcome: Progressing  Goal: Patient-Specific Goal (Individualized)  10/1/2024 1831 by Dom Tsang LPN  Outcome: Met  10/1/2024 1556 by Dom Tsang LPN  Outcome: Progressing  Goal: Absence of Hospital-Acquired Illness or Injury  10/1/2024 1831 by Dom Tsang LPN  Outcome: Met  10/1/2024 1556 by Dom Tsang LPN  Outcome: Progressing  Goal: Optimal Comfort and Wellbeing  10/1/2024 1831 by Dom Tsang LPN  Outcome: Met  10/1/2024 1556 by Dom Tsang LPN  Outcome: Progressing  Goal: Readiness for Transition of Care  10/1/2024 1831 by Dom Tsang LPN  Outcome: Met  10/1/2024 1556 by Dom Tsang LPN  Outcome: Progressing     Problem: Infection  Goal: Absence of Infection Signs and Symptoms  10/1/2024 1831 by Dom Tsang LPN  Outcome: Met  10/1/2024 1556 by Dom Tsang LPN  Outcome: Progressing     Problem: Wound  Goal: Optimal Coping  10/1/2024 1831 by Dom Tsang LPN  Outcome: Met  10/1/2024 1556 by Dom Tsang LPN  Outcome: Progressing  Goal: Optimal Functional Ability  10/1/2024 1831 by Dom Tsang LPN  Outcome: Met  10/1/2024 1556 by Dom Tsang LPN  Outcome: Progressing  Goal: Absence of Infection Signs and Symptoms  10/1/2024 1831 by Dom Tsang LPN  Outcome: Met  10/1/2024 1556 by Dom Tsang LPN  Outcome: Progressing  Goal: Improved Oral Intake  10/1/2024 1831 by Dom Tsang LPN  Outcome: Met  10/1/2024 1556 by Dom Tsang LPN  Outcome: Progressing  Goal: Optimal Pain Control and Function  10/1/2024 1831 by Dom Tsang LPN  Outcome: Met  10/1/2024 1556 by Dom Tsang LPN  Outcome: Progressing  Goal: Skin Health and Integrity  10/1/2024 1831 by Dom Tsang LPN  Outcome: Met  10/1/2024 1556 by Dom Tsang LPN  Outcome:  Progressing  Goal: Optimal Wound Healing  10/1/2024 1831 by Dom Tsang LPN  Outcome: Met  10/1/2024 1556 by Dom Tsang LPN  Outcome: Progressing     Problem: Skin Injury Risk Increased  Goal: Skin Health and Integrity  10/1/2024 1831 by Dom Tsang LPN  Outcome: Met  10/1/2024 1556 by Dom Tsang LPN  Outcome: Progressing

## 2024-10-01 NOTE — PT/OT/SLP EVAL
"Physical Therapy Evaluation    Patient Name:  Abbey Hagan   MRN:  [id number]    Recommendations:     Discharge Recommendations: Low Intensity Therapy   Discharge Equipment Recommendations: none   Barriers to discharge: Decreased caregiver support    Assessment:     Abbey Hagan is a 69 y.o. female admitted with a medical diagnosis of Labial abscess.  She presents with the following impairments/functional limitations: weakness, impaired endurance, impaired self care skills, impaired functional mobility, gait instability, impaired balance, decreased lower extremity function, decreased safety awareness, impaired cardiopulmonary response to activity. Patient is agreeable to participation with PT evaluation. She lives alone and uses a rollator for ambulation at baseline. She performs supine <> sit <> stand with SBA and RW. She ambulated 250' with RW, SBA-CGA, and patient having difficulty ambulating straight with RW which patient attributes to using RW versus rollator that she typically uses. She returned to bed with all needs met and RN notified.     Rehab Prognosis: Good; patient would benefit from acute skilled PT services to address these deficits and reach maximum level of function.    Recent Surgery: * No surgery found *      Plan:     During this hospitalization, patient to be seen 5 x/week to address the identified rehab impairments via gait training, therapeutic activities, therapeutic exercises and progress toward the following goals:    Plan of Care Expires:  11/01/24    Subjective     Chief Complaint: "y'all are making me sick" - in regards to the hospital food   Patient/Family Comments/goals: agrees to walk   Pain/Comfort:  Pain Rating 1: 0/10    Patients cultural, spiritual, Anglican conflicts given the current situation:      Living Environment:  Patient lives alone in a [facility] with ramp to enter   Prior to admission, patients level of function was Melina with rollator. She denies any recent falls or " PT. (+) .  Equipment used at home: walker, rolling, rollator, shower chair.  DME owned (not currently used): none.  Upon discharge, patient will have assistance from HHPT.    Objective:     Communicated with DUSTIN Casatñeda prior to session.  Patient found HOB elevated with telemetry (no bed alarm) upon PT entry to room.    General Precautions: Standard, fall  Orthopedic Precautions:N/A   Braces: N/A  Respiratory Status: Room air    Exams:  RLE Strength: WFL  LLE Strength: WFL    Functional Mobility:  Bed Mobility:     Supine to Sit: stand by assistance  Transfers:     Sit to Stand:  stand by assistance with rolling walker  Gait: 250' with RW, SBA-CGA, and patient having difficulty ambulating straight with RW which patient attributes to using RW versus rollator that she typically uses      AM-PAC 6 CLICK MOBILITY  Total Score:22       Treatment & Education:  Patient was educated on the importance of OOB activity and functional mobility to negate negative effects of prolonged bed rest during hospitalization, safe transfers and ambulation, and D/C planning     Patient left HOB elevated with all lines intact, call button in reach, and RN notified. Patient reports she has been getting to [facility] today without nursing assistance     GOALS:   Multidisciplinary Problems       Physical Therapy Goals          Problem: Physical Therapy    Goal Priority Disciplines Outcome Interventions   Physical Therapy Goal     PT, PT/OT     Description: Goals to be met by: 24    Patient will increase functional independence with mobility by performin. Supine to sit with Supervision  2. Sit to stand transfer with Supervision  3. Bed to chair transfer with Supervision using Rollator  4. Gait  x 250 feet with Supervision using Rollator  5. Lower extremity exercise program x20 reps per handout, with supervision                       History:     Past Medical History:   Diagnosis Date    Anxiety     Arthritis     back, neck and knees     Depression     Emphysema of lung     HSV infection     Hypertension     S/P total hip arthroplasty 01/12/2016       Past Surgical History:   Procedure Laterality Date    COLONOSCOPY N/A 10/26/2020    Procedure: COLONOSCOPY;  Surgeon: Gerard Hernandez MD;  Location: [facility];  Service: Endoscopy;  Laterality: N/A;    HIP ARTHROPLASTY      bilat    JOINT REPLACEMENT      bilat hip    TRANSFORAMINAL EPIDURAL INJECTION OF STEROID Bilateral 4/6/2021    Procedure: Injection,steroid,epidural,transforaminal approach;  Surgeon: Scott Barger MD;  Location: [facility];  Service: Pain Management;  Laterality: Bilateral;  L4-5       Time Tracking:     PT Received On: 10/01/24  PT Start Time: 1439     PT Stop Time: 1458  PT Total Time (min): 19 min     Billable Minutes: Evaluation 9 and Gait Training 10      10/01/2024

## 2024-10-01 NOTE — DISCHARGE INSTRUCTIONS
Wound care  Wash perineum with soap and water and dry. Apply a thin layer of Triad to skin irritation area and leave open to air twice a day.

## 2024-10-01 NOTE — ASSESSMENT & PLAN NOTE
Chronic, controlled. Latest blood pressure and vitals reviewed-     Temp:  [97.6 °F (36.4 °C)-99.1 °F (37.3 °C)]   Pulse:  [74-87]   Resp:  [16-20]   BP: ()/(57-85)   SpO2:  [92 %-97 %] .   Home meds for hypertension were reviewed and noted below.   Hypertension Medications               lisinopriL-hydrochlorothiazide (PRINZIDE,ZESTORETIC) 10-12.5 mg per tablet TAKE 1 TABLET BY MOUTH EVERY DAY            While in the hospital, will manage blood pressure as follows; Continue home antihypertensive regimen    Will utilize p.r.n. blood pressure medication only if patient's blood pressure greater than 140/90 and she develops symptoms such as worsening chest pain or shortness of breath.

## 2024-10-01 NOTE — CARE UPDATE
09/30/24 1955   Patient Assessment/Suction   Level of Consciousness (AVPU) alert   Respiratory Effort Unlabored   Expansion/Accessory Muscles/Retractions no retractions;no use of accessory muscles   All Lung Fields Breath Sounds coarse   CAS Breath Sounds wheezes, expiratory   LLL Breath Sounds coarse   RUL Breath Sounds coarse   RML Breath Sounds diminished;coarse   RLL Breath Sounds coarse;diminished   PRE-TX-O2   Device (Oxygen Therapy) room air   SpO2 (!) 93 %   Pulse Oximetry Type Intermittent   $ Pulse Oximetry - Multiple Charge Pulse Oximetry - Multiple   Pulse 84   Resp 20   Aerosol Therapy   $ Aerosol Therapy Charges Aerosol Treatment   Respiratory Treatment Status (SVN) given   Treatment Route (SVN) mask;oxygen   Patient Position HOB elevated   Post Treatment Assessment (SVN) wheezing decreased;increased aeration   Signs of Intolerance (SVN) none   Breath Sounds Post-Respiratory Treatment   Throughout All Fields Post-Treatment All Fields   Throughout All Fields Post-Treatment aeration increased

## 2024-10-01 NOTE — SUBJECTIVE & OBJECTIVE
Interval History:  Patient seen and examined.  No acute events overnight.    Review of Systems  Objective:     Vital Signs (Most Recent):  Temp: 98 °F (36.7 °C) (10/01/24 1140)  Pulse: 77 (10/01/24 1140)  Resp: 18 (10/01/24 1140)  BP: (!) 143/76 (10/01/24 1140)  SpO2: 97 % (10/01/24 1140) Vital Signs (24h Range):  Temp:  [97.6 °F (36.4 °C)-99.1 °F (37.3 °C)] 98 °F (36.7 °C)  Pulse:  [74-87] 77  Resp:  [16-20] 18  SpO2:  [92 %-97 %] 97 %  BP: ()/(57-85) 143/76     Weight: 70.7 kg (155 lb 13.8 oz)  Body mass index is 23.02 kg/m².    Intake/Output Summary (Last 24 hours) at 10/1/2024 1223  Last data filed at 10/1/2024 0542  Gross per 24 hour   Intake 240 ml   Output 750 ml   Net -510 ml         Physical Exam  Vitals and nursing note reviewed.   Constitutional:       General: She is not in acute distress.  Eyes:      Pupils: Pupils are equal, round, and reactive to light.   Cardiovascular:      Rate and Rhythm: Normal rate and regular rhythm.      Heart sounds: No murmur heard.  Pulmonary:      Effort: Pulmonary effort is normal.      Breath sounds: Normal breath sounds.   Abdominal:      General: There is no distension.      Palpations: Abdomen is soft.      Tenderness: There is no abdominal tenderness.   Skin:     Coloration: Skin is not pale.      Findings: No rash.   Neurological:      Mental Status: She is alert and oriented to person, place, and time.             Significant Labs: All pertinent labs within the past 24 hours have been reviewed.    Significant Imaging: I have reviewed all pertinent imaging results/findings within the past 24 hours.

## 2024-10-01 NOTE — PLAN OF CARE
Pt is clear for dc from [facility] .discharging with [facility]./shaka        10/01/24 1519   Final Note   Assessment Type Final Discharge Note   Anticipated Discharge Disposition Home-Health   Hospital Resources/Appts/Education Provided Appointments scheduled and added to AVS;Post-Acute resouces added to AVS   Post-Acute Status   Post-Acute Authorization Home Health   Home Health Status Set-up Complete/Auth obtained

## 2024-10-01 NOTE — PROGRESS NOTES
[facility] Medicine  Progress Note    Patient Name: Abbey Hagan  MRN: [id number]  Patient Class: IP- Inpatient   Admission Date: 9/28/2024  Length of Stay: 3 days  Attending Physician: Brodie Jefferson MD  Primary Care Provider: Noy Michaud MD        Subjective:     Principal Problem:Labial abscess        HPI:  Abbey Hagan he is a 69-year-old female who presents emergency room for evaluation of rectal pain.  She reports rectal pain for the past several weeks.  She also endorses a possible rectal cyst with possible drainage.  She has not started any health care at this time.  She denies any fever chills.  No known sick contacts or travel.  No aggravating alleviating factors.  Previous medical history includes hypertension, COPD, former smoker.  ER workup: CBC with leukocytosis 27,000.  CMP with sodium 128, potassium 3.8, albumin 2.9 and bilirubin 1.5.  CT pelvis demonstrates and Approximately 4.5 x 0.5 x 2.5 cm gas and fluid containing abscess in right labia majora/perineum.  General surgery and gyn were consulted.  Patient will be made NPO after midnight tonight.  Patient was given vancomycin Levaquin and Flagyl.  Admitted to Hospital Medicine.            Interval History:  Patient seen and examined.  No acute events overnight.    Review of Systems  Objective:     Vital Signs (Most Recent):  Temp: 98 °F (36.7 °C) (10/01/24 1140)  Pulse: 77 (10/01/24 1140)  Resp: 18 (10/01/24 1140)  BP: (!) 143/76 (10/01/24 1140)  SpO2: 97 % (10/01/24 1140) Vital Signs (24h Range):  Temp:  [97.6 °F (36.4 °C)-99.1 °F (37.3 °C)] 98 °F (36.7 °C)  Pulse:  [74-87] 77  Resp:  [16-20] 18  SpO2:  [92 %-97 %] 97 %  BP: ()/(57-85) 143/76     Weight: 70.7 kg (155 lb 13.8 oz)  Body mass index is 23.02 kg/m².    Intake/Output Summary (Last 24 hours) at 10/1/2024 1223  Last data filed at 10/1/2024 0542  Gross per 24 hour   Intake 240 ml   Output 750 ml   Net -510 ml         Physical Exam  Vitals  and nursing note reviewed.   Constitutional:       General: She is not in acute distress.  Eyes:      Pupils: Pupils are equal, round, and reactive to light.   Cardiovascular:      Rate and Rhythm: Normal rate and regular rhythm.      Heart sounds: No murmur heard.  Pulmonary:      Effort: Pulmonary effort is normal.      Breath sounds: Normal breath sounds.   Abdominal:      General: There is no distension.      Palpations: Abdomen is soft.      Tenderness: There is no abdominal tenderness.   Skin:     Coloration: Skin is not pale.      Findings: No rash.   Neurological:      Mental Status: She is alert and oriented to person, place, and time.             Significant Labs: All pertinent labs within the past 24 hours have been reviewed.    Significant Imaging: I have reviewed all pertinent imaging results/findings within the past 24 hours.    Assessment/Plan:      * Labial abscess  Seen by gyn, recommended expectant management and continuation of antibiotics.  No operative indication at this time.  Continue pain control and other supportive care.        Acute deep vein thrombosis (DVT) of pelvic vein  Noted incidentally on CT scan.  Will initiate anticoagulation with rivaroxaban, and follow-up with Hematology/Oncology as outpatient.      COPD with asthma  Patient's COPD is controlled currently.  Patient is currently off COPD Pathway. Continue scheduled inhalers and monitor respiratory status closely.         Essential hypertension  Chronic, controlled. Latest blood pressure and vitals reviewed-     Temp:  [97.6 °F (36.4 °C)-99.1 °F (37.3 °C)]   Pulse:  [74-87]   Resp:  [16-20]   BP: ()/(57-85)   SpO2:  [92 %-97 %] .   Home meds for hypertension were reviewed and noted below.   Hypertension Medications               lisinopriL-hydrochlorothiazide (PRINZIDE,ZESTORETIC) 10-12.5 mg per tablet TAKE 1 TABLET BY MOUTH EVERY DAY            While in the hospital, will manage blood pressure as follows; Continue home  antihypertensive regimen    Will utilize p.r.n. blood pressure medication only if patient's blood pressure greater than 140/90 and she develops symptoms such as worsening chest pain or shortness of breath.      VTE Risk Mitigation (From admission, onward)           Ordered     rivaroxaban tablet 10 mg  With dinner         10/01/24 0912     Place CARLOTTA hose  Until discontinued         09/28/24 2210     IP VTE LOW RISK PATIENT  Once         09/28/24 2210     Place sequential compression device  Until discontinued         09/28/24 2210                    Discharge Planning   NEDA: 10/2/2024     Code Status: Full Code   Is the patient medically ready for discharge?:     Reason for patient still in hospital (select all that apply): Other (specify) Patient appealed discharge  Discharge Plan A: Home                  Brodie Jefferson MD  Department of Hospital Medicine   [facility]/Surg

## 2024-10-01 NOTE — PLAN OF CARE
SSC checked the [employer] website to review for appeal status.  Appeal ID: [id number] has been determined.      [employer] is in agreement of the discharge.  Patient's liability starts on 10/2/24 at noon.

## 2024-10-01 NOTE — PLAN OF CARE
SSC reviewed [employer] website for an update on Appeal ID: [id number].    Current Status: In clinical Review

## 2024-10-01 NOTE — CONSULTS
[facility]  Department of Obstetrics and Gynecology  Progress Note    PATIENT NAME: Abbey Hagan  MRN: [id number]  TODAY'S DATE: 10/01/2024  ADMIT DATE: 2024    SUBJECTIVE     PRINCIPLE PROBLEM: Labial abscess    OBGYN Consult    Subjective:   Chief Complaint:  Rectal Bleeding (Cyst on rectum )      10/01/2024        Patient ID: Abbey Hagan is a  69 y.o. female.    The patient was seen in consultation today due to the following:  She presented to the she presented to the emergency room on 2024 with what she felt was rectal bleeding but in reality was a draining vulvar abscess.    She was admitted and initiated on IV antibiotics.      On evaluation today the patient reports feeling significantly better.  There is no swelling minimal drainage and she is overall doing well without gynecologic complaints    GYN & OB History    Patient's last menstrual period was 1988.       OB History    Para Term  AB Living   1 0     1     SAB IAB Ectopic Multiple Live Births                  # Outcome Date GA Lbr Jabari/2nd Weight Sex Type Anes PTL Lv   1 AB                Past Medical History:   Diagnosis Date    Anxiety     Arthritis     back, neck and knees    Depression     Emphysema of lung     HSV infection     Hypertension     S/P total hip arthroplasty 2016        Past Surgical History:   Procedure Laterality Date    COLONOSCOPY N/A 10/26/2020    Procedure: COLONOSCOPY;  Surgeon: Gerard Hernandez MD;  Location: [facility];  Service: Endoscopy;  Laterality: N/A;    HIP ARTHROPLASTY      bilat    JOINT REPLACEMENT      bilat hip    TRANSFORAMINAL EPIDURAL INJECTION OF STEROID Bilateral 2021    Procedure: Injection,steroid,epidural,transforaminal approach;  Surgeon: Scott Barger MD;  Location: [facility];  Service: Pain Management;  Laterality: Bilateral;  L4-5        Review of Systems  Review of Systems   Constitutional:  Negative for fever and unexpected weight  change.   Respiratory:  Negative for cough and shortness of breath.    Cardiovascular:  Negative for chest pain and palpitations.   Gastrointestinal:  Negative for constipation, diarrhea, nausea and vomiting.   Genitourinary:  Negative for dysuria, frequency, hematuria and urgency.        Gyn as per HPI   Neurological:  Negative for headaches.        Objective:      Vitals:    10/01/24 1140   BP: (!) 143/76   Pulse: 77   Resp: 18   Temp: 98 °F (36.7 °C)     Physical Exam:   Constitutional: She appears well-developed and well-nourished. No distress.       Cardiovascular:  Normal rate.             Pulmonary/Chest: Effort normal. No respiratory distress.          Genitourinary:                              Recent Imaging Results:   CT Abdomen Pelvis With IV Contrast Routine Oral Contrast  Narrative: EXAMINATION:  CT ABDOMEN PELVIS WITH IV CONTRAST    CLINICAL HISTORY:  Abdominal abscess/infection suspected;    TECHNIQUE:  Low dose axial images, sagittal and coronal reformations were obtained from the lung bases to the pubic symphysis following the IV administration of 100 mL of Omnipaque 350 and the oral administration of 30 mL of Omnipaque 350.    COMPARISON:  08/17/2020    FINDINGS:  The lung bases are clear.    There is focal fatty infiltration adjacent to the falciform ligament.  The hepatic cysts are visualized.  The superior mesenteric vein, portal vein and splenic vein are patent.  The spleen, adrenal glands, and pancreas are unremarkable.  Subcentimeter low-attenuation lesions are seen within the kidneys likely representing cysts but technically too small to characterize.  There is no evidence hydronephrosis.    There is air and fluid seen within the collection in the right labia measuring approximately 7.0 x 2.0 cm.    There is low attenuation seen within a pelvic vein on the right consistent with thrombosis.    There is no evidence bowel obstruction.  Stool is seen throughout the colon.  The appendix is  within normal limits.    There is a 6.3 cm low-attenuation lesion seen in association with the left adnexa.  The patient has bilateral total hip arthroplasties.  Impression: There is a 7.0 x 2.0 cm abscess in the right labia.    The there is a 6.3 cm low-attenuation lesion seen within the left adnexa possibly representing cyst.  Nonemergent pelvic ultrasound for further evaluation is recommended.    There is thrombosis of the deep right pelvic vein.    This report was flagged in Epic as abnormal.    Electronically signed by: Sharon Andre MD  Date:    09/29/2024  Time:    08:20      Assessment:     1. Labial abscess    2. Hyponatremia    3. Hypokalemia    4. Hypochloremia         Plan:     The patient was seen in her hospital room.    Chart including radiology studies and labs were reviewed.    The above was reviewed discussed with the patient.    From a gynecologic standpoint the patient is currently doing well.    The vulvar abscesses appears markedly improved.    Agree with plans for discharge on outpatient antibiotics.    The importance of evaluation in the office for complete resolution of the abscess and two re-evaluate the previously noted left adnexal cyst was reviewed discussed with the patient.      Gynecology will sign off at this time but if the plan changes please feel free to notify us.    Alvarado Barraza MD  Department OBGYN

## 2024-10-01 NOTE — PT/OT/SLP PROGRESS
Physical Therapy      Patient Name:  Abbey Hagan   MRN:  [id number]    PT eval attempted. Pt seen up in commode. Requesting later PT.

## 2024-10-01 NOTE — PLAN OF CARE
SSC received call from [facility] requesting the discharge IMM.  [facility] provided Discharge IMM to [facility] for review of Appeal ID: [id number].    Upload Confirmation #: [id number]    SSC following.

## 2024-10-02 ENCOUNTER — TELEPHONE (OUTPATIENT)
Facility: CLINIC | Age: 69
End: 2024-10-02
Payer: MEDICARE

## 2024-10-02 ENCOUNTER — PATIENT MESSAGE (OUTPATIENT)
Dept: OBSTETRICS AND GYNECOLOGY | Facility: CLINIC | Age: 69
End: 2024-10-02
Payer: MEDICARE

## 2024-10-02 ENCOUNTER — PATIENT OUTREACH (OUTPATIENT)
Dept: ADMINISTRATIVE | Facility: OTHER | Age: 69
End: 2024-10-02
Payer: MEDICARE

## 2024-10-02 NOTE — DISCHARGE SUMMARY
Count includes the [facility] Medicine  Discharge Summary      Patient Name: Abbey Hagan  MRN: [id number]  BEBO: [id number]  Patient Class: IP- Inpatient  Admission Date: 9/28/2024  Hospital Length of Stay: 3 days  Discharge Date and Time: 10/1/2024  6:43 PM  Attending Physician: No att. providers found   Discharging Provider: Brodie Jefferson MD  Primary Care Provider: Noy Michaud MD    Primary Care Team: Networked reference to record PCT     HPI:   Abbey Hagan he is a 69-year-old female who presents emergency room for evaluation of rectal pain.  She reports rectal pain for the past several weeks.  She also endorses a possible rectal cyst with possible drainage.  She has not started any health care at this time.  She denies any fever chills.  No known sick contacts or travel.  No aggravating alleviating factors.  Previous medical history includes hypertension, COPD, former smoker.  ER workup: CBC with leukocytosis 27,000.  CMP with sodium 128, potassium 3.8, albumin 2.9 and bilirubin 1.5.  CT pelvis demonstrates and Approximately 4.5 x 0.5 x 2.5 cm gas and fluid containing abscess in right labia majora/perineum.  General surgery and gyn were consulted.  Patient will be made NPO after midnight tonight.  Patient was given vancomycin Levaquin and Flagyl.  Admitted to Hospital Medicine.            * No surgery found *      Hospital Course:   69-year-old female admitted with labial abscess.  Gynecology was consulted.  She was placed on vancomycin, Levaquin, Flagyl.  Patient was seen by gyn and felt that operative incision and drainage was not necessary at this point in time.  The abscess was draining appropriately on its own.  Patient was transitioned to oral antibiotics and discharged home with follow-up with gyn.  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      Goals of Care Treatment Preferences:  Code Status: Full Code      SDOH Screening:  The patient was screened for utility difficulties, food insecurity, transport difficulties, housing insecurity, and interpersonal safety and there were no concerns identified this admission.     Consults:   Consults (From admission, onward)          Status Ordering Provider     Inpatient consult to Obstetrics  Once        Provider:  Alvarado Barraza MD    Completed PRIETO ZAMUDIO            No new Assessment & Plan notes have been filed under this hospital service since the last note was generated.  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    Follow Up:   Follow-up Information       Digna Simpson MD. Schedule an appointment as soon as possible for a visit.    Specialty: Obstetrics and Gynecology  Contact information:  [address]  [place] [address]  [phone]               Noy Michaud MD Follow up.    Specialty: Internal Medicine  Why: 10/2 @ 1pm for hospital follow up  Contact information:  [address]  [place] [address]  [phone]               [facility] Follow up.    Specialties: Home Health Services, Home Therapy Services, Home Living Aide Services  Why: 1st visit tomorrow 10/1  Contact information:  [address] [address]  [phone]                         Patient Instructions:      Ambulatory referral/consult to Hematology / Oncology   Standing Status: Future   Referral Priority: Routine Referral Type: Consultation   Referral Reason: Specialty  Services Required   Requested Specialty: Hematology and Oncology   Number of Visits Requested: 1     Ambulatory referral/consult to Obstetrics / Gynecology   Standing Status: Future   Referral Priority: Routine Referral Type: Consultation   Referral Reason: Specialty Services Required   Requested Specialty: Obstetrics and Gynecology   Number of Visits Requested: 1     Ambulatory referral/consult to Home Health   Standing Status: Future   Referral Priority: Routine Referral Type: Home Health   Referral Reason: Specialty Services Required   Requested Specialty: Home Health Services   Number of Visits Requested: 1     Ambulatory referral/consult to Outpatient Case Management   Referral Priority: Routine Referral Type: Consultation   Referral Reason: Specialty Services Required   Number of Visits Requested: 1     Activity as tolerated       Significant Diagnostic Studies: N/A    Pending Diagnostic Studies:       None           Medications:  Reconciled Home Medications:      Medication List        START taking these medications      doxycycline 100 MG tablet  Commonly known as: VIBRA-TABS  Take 1 tablet (100 mg total) by mouth every 12 (twelve) hours. for 10 days     rivaroxaban 10 mg Tab  Commonly known as: XARELTO  Take 1 tablet (10 mg total) by mouth daily with dinner or evening meal.            CONTINUE taking these medications      albuterol 90 mcg/actuation inhaler  Commonly known as: VENTOLIN HFA  Inhale 2 puffs into the lungs every 4 (four) hours as needed for Wheezing or Shortness of Breath. Rescue     albuterol-ipratropium 2.5 mg-0.5 mg/3 mL nebulizer solution  Commonly known as: DUO-NEB  Take 3 mLs by nebulization every 6 (six) hours as needed for Wheezing or Shortness of Breath. Rescue     B COMPLEX 1 ORAL  Take by mouth.     EScitalopram oxalate 20 MG tablet  Commonly known as: LEXAPRO  TAKE 1 TABLET(20 MG) BY MOUTH EVERY DAY     fluticasone-umeclidin-vilanter 100-62.5-25 mcg Dsdv  Commonly known as: TRELEGY  ELLIPTA  Inhale 1 puff into the lungs once daily.     gabapentin 300 MG capsule  Commonly known as: NEURONTIN  Take 2 capsules (600 mg total) by mouth every evening.     lisinopriL-hydrochlorothiazide 10-12.5 mg per tablet  Commonly known as: PRINZIDE,ZESTORETIC  TAKE 1 TABLET BY MOUTH EVERY DAY     MAGNESIUM-POTASSIUM ORAL  Take 1 tablet by mouth once daily. Every day     multivitamin capsule  Take 1 capsule by mouth once daily.     predniSONE 20 MG tablet  Commonly known as: DELTASONE  Take 1 tablet (20 mg total) by mouth once daily. Take one daily for 5 days, repeat for breathing problems.     TYLENOL 8 HOUR ORAL  Take by mouth.     VITAMIN C 100 MG tablet  Generic drug: ascorbic acid (vitamin C)  Take 100 mg by mouth once daily.     VITAMIN D-3 ORAL  No Sig Provided              Indwelling Lines/Drains at time of discharge:   Lines/Drains/Airways       None                   Time spent on the discharge of patient: 34 minutes         Brodie Jefferson MD  Department of Hospital Medicine  [facility]/Surg

## 2024-10-02 NOTE — NURSING
Received referral on 9/30/24 from Dr Jefferson for DVT of Right pelvic Vein. Attempted to contact patient by phone but got no answer. Left message on answering system explaining reason for call and requested a call back.

## 2024-10-02 NOTE — PROGRESS NOTES
CHW - Initial Contact    This Community Health Worker completed OR updated the Social Determinant of Health questionnaire with patient via telephone today.    Pt identified barriers of most importance are: Spoke to the patient and she notified me that she is interested in [place] [employer] info. Pt is fine with me following up in three weeks.    Referrals were put through [facility] - no: none    Support and Services:   Other information discussed the patient needs / wants help with: Spoke to the patient and she notified me that she is interested in [place] [employer] info. Pt is fine with me following up in three weeks.    Follow up required:   Initial Outreach - Due: 10/23/2024

## 2024-10-03 ENCOUNTER — TELEPHONE (OUTPATIENT)
Dept: OBSTETRICS AND GYNECOLOGY | Facility: CLINIC | Age: 69
End: 2024-10-03
Payer: MEDICARE

## 2024-10-03 ENCOUNTER — TELEPHONE (OUTPATIENT)
Facility: CLINIC | Age: 69
End: 2024-10-03
Payer: MEDICARE

## 2024-10-03 NOTE — NURSING
Oncology Navigation   Intake  Cancer Type: Benign hem  Type of Referral: External  Date of Referral: 09/30/24  Initial Nurse Navigator Contact: 10/02/24  Referral to Initial Contact Timeline (days): 2  Appointment Date: 10/16/24     Treatment                              Acuity      Follow Up  No follow-ups on file.     Received referral on 9/30/24 from Dr Brodie Jefferson for DVT of Right Pelvic Vein. Spoke with patient who verbalized understanding of date, time and location of appointment.

## 2024-10-03 NOTE — TELEPHONE ENCOUNTER
----- Message from Matias sent at 10/3/2024 11:46 AM CDT -----  Type: Needs Medical Advice  Who Called:  pt    Best Call Back Number: [phone]    Additional Information: Pt is calling the office needs to schedule hospital follow up appt.Please call back and advise.

## 2024-10-04 LAB
BACTERIA BLD CULT: NORMAL
BACTERIA BLD CULT: NORMAL

## 2024-10-08 ENCOUNTER — HOSPITAL ENCOUNTER (OUTPATIENT)
Dept: RADIOLOGY | Facility: HOSPITAL | Age: 69
Discharge: HOME | End: 2024-10-08
Attending: GENERAL PRACTICE
Payer: MEDICARE

## 2024-10-08 DIAGNOSIS — N83.209 CYST OF OVARY, UNSPECIFIED LATERALITY: ICD-10-CM

## 2024-10-08 PROCEDURE — 76856 US EXAM PELVIC COMPLETE: CPT | Mod: 26,,, | Performed by: RADIOLOGY

## 2024-10-08 PROCEDURE — 76856 US EXAM PELVIC COMPLETE: CPT | Mod: TC

## 2024-10-16 ENCOUNTER — OFFICE VISIT (OUTPATIENT)
Dept: HEMATOLOGY/ONCOLOGY | Facility: CLINIC | Age: 69
End: 2024-10-16
Payer: MEDICARE

## 2024-10-16 VITALS
OXYGEN SATURATION: 98 % | DIASTOLIC BLOOD PRESSURE: 65 MMHG | BODY MASS INDEX: 22.83 KG/M2 | SYSTOLIC BLOOD PRESSURE: 120 MMHG | HEIGHT: 69 IN | HEART RATE: 68 BPM | WEIGHT: 154.13 LBS | RESPIRATION RATE: 16 BRPM | TEMPERATURE: 97 F

## 2024-10-16 DIAGNOSIS — N76.4 LABIAL ABSCESS: ICD-10-CM

## 2024-10-16 DIAGNOSIS — I10 ESSENTIAL HYPERTENSION: ICD-10-CM

## 2024-10-16 DIAGNOSIS — I82.890 ACUTE DEEP VEIN THROMBOSIS (DVT) OF PELVIC VEIN: Primary | ICD-10-CM

## 2024-10-16 PROCEDURE — 4010F ACE/ARB THERAPY RXD/TAKEN: CPT | Mod: CPTII,S$GLB,, | Performed by: INTERNAL MEDICINE

## 2024-10-16 PROCEDURE — 1159F MED LIST DOCD IN RCRD: CPT | Mod: CPTII,S$GLB,, | Performed by: INTERNAL MEDICINE

## 2024-10-16 PROCEDURE — 1111F DSCHRG MED/CURRENT MED MERGE: CPT | Mod: CPTII,S$GLB,, | Performed by: INTERNAL MEDICINE

## 2024-10-16 PROCEDURE — 1101F PT FALLS ASSESS-DOCD LE1/YR: CPT | Mod: CPTII,S$GLB,, | Performed by: INTERNAL MEDICINE

## 2024-10-16 PROCEDURE — 3078F DIAST BP <80 MM HG: CPT | Mod: CPTII,S$GLB,, | Performed by: INTERNAL MEDICINE

## 2024-10-16 PROCEDURE — 99204 OFFICE O/P NEW MOD 45 MIN: CPT | Mod: S$GLB,,, | Performed by: INTERNAL MEDICINE

## 2024-10-16 PROCEDURE — 3288F FALL RISK ASSESSMENT DOCD: CPT | Mod: CPTII,S$GLB,, | Performed by: INTERNAL MEDICINE

## 2024-10-16 PROCEDURE — 3074F SYST BP LT 130 MM HG: CPT | Mod: CPTII,S$GLB,, | Performed by: INTERNAL MEDICINE

## 2024-10-16 PROCEDURE — 1125F AMNT PAIN NOTED PAIN PRSNT: CPT | Mod: CPTII,S$GLB,, | Performed by: INTERNAL MEDICINE

## 2024-10-16 PROCEDURE — 3008F BODY MASS INDEX DOCD: CPT | Mod: CPTII,S$GLB,, | Performed by: INTERNAL MEDICINE

## 2024-10-16 PROCEDURE — 99999 PR PBB SHADOW E&M-EST. PATIENT-LVL V: CPT | Mod: PBBFAC,,, | Performed by: INTERNAL MEDICINE

## 2024-10-16 RX ORDER — DULOXETIN HYDROCHLORIDE 60 MG/1
60 CAPSULE, DELAYED RELEASE ORAL DAILY
COMMUNITY
Start: 2024-10-05

## 2024-10-16 RX ORDER — DICYCLOMINE HYDROCHLORIDE 20 MG/1
20 TABLET ORAL 2 TIMES DAILY
COMMUNITY
Start: 2024-08-12

## 2024-10-16 NOTE — PROGRESS NOTES
Subjective:       Patient ID: Abbey Hagan is a 69 y.o. female.    Chief Complaint: DVT to Right Pelvic Vein    HPI  69-year-old female found to have pelvic deep vein thrombosis started on Lovenox changed to rivaroxaban discharged home and sent to Hematology Clinic   No dvt prev. No family hx  No one else in family on blood thinners  No misccariages    Review of Systems   Respiratory:  Positive for shortness of breath. Negative for cough.    Cardiovascular:  Negative for chest pain.   Gastrointestinal:  Positive for abdominal pain. Negative for diarrhea.   Genitourinary:  Negative for frequency.   Musculoskeletal:  Positive for back pain.   Skin:  Negative for rash.   Neurological:  Negative for headaches.   Psychiatric/Behavioral:  The patient is nervous/anxious.              Past Medical History:   Diagnosis Date    Anxiety     Arthritis     back, neck and knees    Depression     Emphysema of lung     HSV infection     Hypertension     S/P total hip arthroplasty 01/12/2016     Past Surgical History:   Procedure Laterality Date    COLONOSCOPY N/A 10/26/2020    Procedure: COLONOSCOPY;  Surgeon: Gerard Hernandez MD;  Location: [facility];  Service: Endoscopy;  Laterality: N/A;    HIP ARTHROPLASTY      bilat    JOINT REPLACEMENT      bilat hip    TRANSFORAMINAL EPIDURAL INJECTION OF STEROID Bilateral 4/6/2021    Procedure: Injection,steroid,epidural,transforaminal approach;  Surgeon: Scott Barger MD;  Location: [facility];  Service: Pain Management;  Laterality: Bilateral;  L4-5     Family History   Problem Relation Name Age of Onset    COPD Mother      Hypertension Mother      Heart disease Mother      Alcohol abuse Father      Depression Sister      Hypertension Sister      Colon cancer Maternal Grandmother      Breast cancer Paternal Aunt      Ovarian cancer Neg Hx      Psoriasis Neg Hx      Lupus Neg Hx      Eczema Neg Hx      Melanoma Neg Hx        Social History     Socioeconomic History    Marital status:  Single   Occupational History     Employer: [employer]   Tobacco Use    Smoking status: Former     Current packs/day: 0.00     Average packs/day: 0.5 packs/day for 40.0 years (20.0 ttl pk-yrs)     Types: Cigarettes     Start date: 9/28/1981     Quit date: 9/28/2021     Years since quitting: 3.0    Smokeless tobacco: Never    Tobacco comments:     Patient down to 8 cigs a day   Substance and Sexual Activity    Alcohol use: Yes     Comment: socially    Drug use: Yes     Types: Oxycodone, Marijuana    Sexual activity: Not Currently     Social Drivers of Health     Financial Resource Strain: Low Risk  (10/15/2024)    Overall Financial Resource Strain (CARDIA)     Difficulty of Paying Living Expenses: Not very hard   Food Insecurity: No Food Insecurity (10/15/2024)    Hunger Vital Sign     Worried About Running Out of Food in the Last Year: Never true     Ran Out of Food in the Last Year: Never true   Transportation Needs: No Transportation Needs (9/29/2024)    TRANSPORTATION NEEDS     Transportation : No   Physical Activity: Insufficiently Active (10/15/2024)    Exercise Vital Sign     Days of Exercise per Week: 2 days     Minutes of Exercise per Session: 20 min   Stress: Stress Concern Present (10/15/2024)    [employer] [place] [employer] - Occupational Stress Questionnaire     Feeling of Stress : Rather much   Housing Stability: Low Risk  (10/15/2024)    Housing Stability Vital Sign     Unable to Pay for Housing in the Last Year: No     Homeless in the Last Year: No     Review of patient's allergies indicates:   Allergen Reactions    Penicillin g      Childhood allergy--unknown reaction    Sulfa (sulfonamide antibiotics)      Childhood allergy--unknown reaction       Current Outpatient Medications:     acetaminophen (TYLENOL 8 HOUR ORAL), Take by mouth daily as needed., Disp: , Rfl:     albuterol (VENTOLIN HFA) 90 mcg/actuation inhaler, Inhale 2 puffs into the lungs every 4 (four) hours as needed for  Wheezing or Shortness of Breath. Rescue, Disp: 18 g, Rfl: 11    albuterol-ipratropium (DUO-NEB) 2.5 mg-0.5 mg/3 mL nebulizer solution, Take 3 mLs by nebulization every 6 (six) hours as needed for Wheezing or Shortness of Breath. Rescue, Disp: 1 Box, Rfl: 11    ascorbic acid, vitamin C, (VITAMIN C) 100 MG tablet, Take 100 mg by mouth once daily., Disp: , Rfl:     dicyclomine (BENTYL) 20 mg tablet, Take 20 mg by mouth 2 (two) times daily., Disp: , Rfl:     DULoxetine (CYMBALTA) 60 MG capsule, Take 60 mg by mouth once daily., Disp: , Rfl:     fluticasone-umeclidin-vilanter (TRELEGY ELLIPTA) 100-62.5-25 mcg DsDv, Inhale 1 puff into the lungs once daily., Disp: 60 each, Rfl: 11    gabapentin (NEURONTIN) 300 MG capsule, Take 2 capsules (600 mg total) by mouth every evening., Disp: 180 capsule, Rfl: 2    lisinopriL-hydrochlorothiazide (PRINZIDE,ZESTORETIC) 10-12.5 mg per tablet, TAKE 1 TABLET BY MOUTH EVERY DAY, Disp: 90 tablet, Rfl: 3    multivitamin capsule, Take 1 capsule by mouth once daily., Disp: , Rfl:     rivaroxaban (XARELTO) 10 mg Tab, Take 1 tablet (10 mg total) by mouth daily with dinner or evening meal., Disp: 300 tablet, Rfl: 0    CHOLECALCIFEROL, VITAMIN D3, (VITAMIN D-3 ORAL), No Sig Provided (Patient not taking: Reported on 10/16/2024), Disp: , Rfl:     EScitalopram oxalate (LEXAPRO) 20 MG tablet, TAKE 1 TABLET(20 MG) BY MOUTH EVERY DAY, Disp: 90 tablet, Rfl: 0    POTASSIUM/MAGNESIUM (MAGNESIUM-POTASSIUM ORAL), Take 1 tablet by mouth once daily. Every day (Patient not taking: Reported on 10/16/2024), Disp: , Rfl:     predniSONE (DELTASONE) 20 MG tablet, Take 1 tablet (20 mg total) by mouth once daily. Take one daily for 5 days, repeat for breathing problems. (Patient not taking: Reported on 10/16/2024), Disp: 25 tablet, Rfl: 2    vitamin B complex (B COMPLEX 1 ORAL), Take by mouth. (Patient not taking: Reported on 10/16/2024), Disp: , Rfl:     Physical Exam    Wt Readings from Last 3 Encounters:   10/16/24  69.9 kg (154 lb 1.6 oz)   09/28/24 70.7 kg (155 lb 13.8 oz)   03/30/22 76.3 kg (168 lb 3.4 oz)     Temp Readings from Last 3 Encounters:   10/16/24 96.5 °F (35.8 °C) (Temporal)   10/01/24 98.3 °F (36.8 °C) (Oral)   11/08/21 98.6 °F (37 °C)     BP Readings from Last 3 Encounters:   10/16/24 120/65   10/01/24 127/63   03/30/22 126/84     Pulse Readings from Last 3 Encounters:   10/16/24 68   10/01/24 75   03/30/22 82    VITAL SIGNS:  as above   GENERAL: appears well-built, well-nourished.  No anxiety, no agitation, and in no distress.  Patient is awake, alert, oriented and cooperative.  HEENT:  Showed no congestion. Trachea is central no obvious icterus or pallor noted no hoarseness. no obvious JVD   NECK:  Supple.  No JVD. No obvious cervical submental or supraclavicular adenopathy.  RS:the visualized portion of  Chest expands well. chest appears symmetric, no audible wheezes.  No dyspnea recognized  ABDOMEN:  abdomen appears undistended.  EXTREMITIES:  Without edema.  NEUROLOGICAL:  The patient is appropriate, higher functions are normal.  No  obvious neurological deficits.  normal judgement normal thought content  No confusion, no speech impediment. Cranial nerves are intact and show no deficit. No gross motor deficits noted   SKIN MUSCULOSKELETAL: no joint or skeletal deformity, no clubbing of nails.  No visible rash ecchymosis or petechiae     Lab Results   Component Value Date    WBC 14.37 (H) 10/01/2024    HGB 11.8 (L) 10/01/2024    HCT 34.1 (L) 10/01/2024    MCV 87 10/01/2024     10/01/2024       BMP  Lab Results   Component Value Date     (L) 10/01/2024    K 4.4 10/01/2024    CL 91 (L) 10/01/2024    CO2 28 10/01/2024    BUN 6 (L) 10/01/2024    CREATININE 0.6 10/01/2024    CALCIUM 8.2 (L) 10/01/2024    ANIONGAP 11 10/01/2024    ESTGFRAFRICA >60.0 03/30/2022    EGFRNONAA >60.0 03/30/2022       Impression:     There is a 7.0 x 2.0 cm abscess in the right labia.     The there is a 6.3 cm  low-attenuation lesion seen within the left adnexa possibly representing cyst.  Nonemergent pelvic ultrasound for further evaluation is recommended.     There is thrombosis of the deep right pelvic vein.     This report was flagged in Epic as abnormal.    Patient Active Problem List   Diagnosis    Essential hypertension    COPD with asthma    Primary osteoarthritis involving multiple joints    Anxiety and depression    Tobacco abuse    History of colon polyps    Tinnitus, bilateral    Decreased hearing of both ears    Colitis    Lumbar radiculitis    Labial abscess    Acute deep vein thrombosis (DVT) of pelvic vein        Assessment and Plan     Deep right pelvic pain thrombosis patient on rivaroxaban  Advised to continue the same for at least 3-6 months restudy  Discussed  bridging with pt  Patient has chronic back pain.  And neck pain has follow-up with primary care has seen orthopedic neurology  Hypertension continue medication and diet  MDM includes  :    - Acute or chronic illness or injury that poses a threat to life or bodily function  - Independent review and explanation of 3+ results from unique tests  - Discussion of management and ordering 3+ unique tests  - Extensive discussion of treatment and management  - Prescription drug management  - Drug therapy requiring intensive monitoring for toxicity

## 2024-10-22 ENCOUNTER — OFFICE VISIT (OUTPATIENT)
Dept: OBSTETRICS AND GYNECOLOGY | Facility: CLINIC | Age: 69
End: 2024-10-22
Payer: MEDICARE

## 2024-10-22 VITALS
HEIGHT: 69 IN | WEIGHT: 157.06 LBS | RESPIRATION RATE: 18 BRPM | BODY MASS INDEX: 23.26 KG/M2 | DIASTOLIC BLOOD PRESSURE: 88 MMHG | SYSTOLIC BLOOD PRESSURE: 126 MMHG

## 2024-10-22 DIAGNOSIS — Z01.419 WELL WOMAN EXAM: Primary | ICD-10-CM

## 2024-10-22 DIAGNOSIS — N83.209 CYST OF OVARY, UNSPECIFIED LATERALITY: ICD-10-CM

## 2024-10-22 PROCEDURE — 99999 PR PBB SHADOW E&M-EST. PATIENT-LVL III: CPT | Mod: PBBFAC,,, | Performed by: GENERAL PRACTICE

## 2024-10-22 RX ORDER — TRAMADOL HYDROCHLORIDE 50 MG/1
50 TABLET ORAL EVERY 8 HOURS PRN
COMMUNITY
Start: 2024-08-28

## 2024-10-22 NOTE — PROGRESS NOTES
Background  2024 (in hospital)  Abbey Hagan is a 69 y.o. yo  who presented to the ER yesterday with what she thought was rectal bleeding.  She had pain and swelling on her bottom.  69 y.o.  with draining right vulvar abscess.  No surrounding cellulitis.  Patient non-toxic.  Incidental finding of 6cm left adnexal cyst and thrombosis of deep right pelvic vein.       SUBJECTIVE   10/22/2024  Abbey Hagan is a 69 y.o. here for WWE and follow-up from recent hospitalization for vulvar abscess.  Had incidental finding of ovarian cyst.  No blood, no pain from bottom - feels as though things have healed.     G'sP's:   LMP: age 39  Relationship: single and not having sex  PAP Hx: no h/o abnormals  LAST PAP: PAP neg / HPV neg / Date: 2014  MMG (24) = negative  HRT:  used for about 1 year only at menopause but not since   BLEEDING: denies     ASSESSMENT / PLAN   69 y.o. for overdue WWE and also here for hospital f/u, has 7x5cm left adnexal cyst with what looks like one thin septation.    rads: pelvic US 4 months after the last.  Reviewed O-RADS system with the patient.  Discussed ERROL testing as an added option; patient declined.  Cervical Cancer screening: PAP testing no longer indicated; recommend periodic pelvic exams with visual inspection and palpation  Mammogram: up to date  Encouraged self breast awareness; RTC for breast concerns  RTC for periodic GYN exam, sooner prn    OBJECTIVE   Vitals:    10/22/24 1345   BP: 126/88   Resp: 18     GEN = alert/oriented, nad, pleasant  HEENT = sclera anicteric, EOM grossly normal  BREASTS = declined, no concerns  CV = BP and HR as per vitals  PULM = normal respiratory effort   =      External: nefg, moderately atrophic, vulvar abscess entirely healed several small comedones     Vagina: severely atrophied and without lesions and urethral meatus normal     Discharge: scant     Cervix: normal-appearing     Bimanual: uterus normal  size and ttp and exam limited by patient discomfort    LABS & RADS      PELVIC US (10/8/24) = (patient declined vaginal ultrasound)  Limited visualization of the uterus was achieved, with the uterus not evaluated.  ROV not seen  A cyst is present in the left adnexa measuring 7.5 x 5.4 x 5.6 cm. This demonstrates a mildly thickened septation. No associated internal color Doppler blood flow. The left ovary is not identified separate from the cyst.         CT ABD PELVIS (9/28/24) =  There is a 7.0 x 2.0 cm abscess in the right labia.   The there is a 6.3 cm low-attenuation lesion seen within the left adnexa possibly representing cyst.  Nonemergent pelvic ultrasound for further evaluation is recommended.   There is thrombosis of the deep right pelvic vein.     Digna Simpson MD    -----------------------    09/29/2024:    GYNECOLOGIC HISTORY  PAP Hx: no h/o abnormals  Menarche: 14 yoa  Non-menstrual pelvic pressure/pain: No  Dyspareunia: N/A  Vasomotor Sxs: denies  Vaginal dryness: denies  HSV     OBSTETRIC HISTORY  IAB: 1 (D&C)     PAST MEDICAL HISTORY      -------------------------------------    Anxiety    Arthritis     back, neck and knees    Depression    Emphysema of lung    HSV infection    Hypertension      PAST SURGICAL HISTORY      ----------------------------    Colonoscopy     Procedure: COLONOSCOPY;  Surgeon: Gerard Hernandez MD;  Location: [facility];  Service: Endoscopy;  Laterality: N/A;    Hip arthroplasty     bilat    Joint replacement     bilat hip    Transforaminal epidural injection of steroid     Procedure: Injection,steroid,epidural,transforaminal approach;  Surgeon: Scott Barger MD;  Location: [facility];  Service: Pain Management;  Laterality: Bilateral;  L4-5      ALLERGIES        Review of patient's allergies indicates:   Allergen Reactions    Penicillin g         Childhood allergy--unknown reaction    Sulfa (sulfonamide antibiotics)         Childhood allergy--unknown reaction      MEDICATIONS        Current Outpatient Medications   Medication Instructions    acetaminophen (TYLENOL 8 HOUR ORAL) Oral    albuterol (VENTOLIN HFA) 90 mcg/actuation inhaler 2 puffs, Inhalation, Every 4 hours PRN, Rescue    albuterol-ipratropium (DUO-NEB) 2.5 mg-0.5 mg/3 mL nebulizer solution 3 mLs, Nebulization, Every 6 hours PRN, Rescue    ascorbic acid (vitamin C) (VITAMIN C) 100 mg, Oral, Daily    CHOLECALCIFEROL, VITAMIN D3, (VITAMIN D-3 ORAL) No Sig Provided    EScitalopram oxalate (LEXAPRO) 20 MG tablet TAKE 1 TABLET(20 MG) BY MOUTH EVERY DAY    fluticasone-umeclidin-vilanter (TRELEGY ELLIPTA) 100-62.5-25 mcg DsDv 1 puff, Inhalation, Daily    gabapentin (NEURONTIN) 600 mg, Oral, Nightly    lisinopriL-hydrochlorothiazide (PRINZIDE,ZESTORETIC) 10-12.5 mg per tablet TAKE 1 TABLET BY MOUTH EVERY DAY    multivitamin capsule 1 capsule, Daily    POTASSIUM/MAGNESIUM (MAGNESIUM-POTASSIUM ORAL) 1 tablet, Oral, Daily, Every day    predniSONE (DELTASONE) 20 mg, Oral, Daily, Take one daily for 5 days, repeat for breathing problems.    vitamin B complex (B COMPLEX 1 ORAL) Oral      SOCIAL HISTORY  Lives with: self  Smoker: quit smoking 1 month ago; had smoked x 50yrs  Alcohol:  quit a few years ago  Drugs: denies  Occupation:  retired      FAMILY HISTORY  BLEEDING or  CLOTTING DISORDERS: none  BREAST CA: grandmother  UTERINE CA: none  OVARIAN CA: none  COLON CA: none     PHYSICAL EXAM      Vitals:     09/29/24 1531   BP: (!) 107/57   Pulse: 68   Resp: 18   Temp: 98.4 °F (36.9 °C)         Vital Signs (Most Recent):  Temp: 98.4 °F (36.9 °C) (09/29/24 1531)  Pulse: 68 (09/29/24 1531)  Resp: 18 (09/29/24 1531)  BP: (!) 107/57 (09/29/24 1531)  SpO2: (!) 92 % (09/29/24 1531) Vital Signs (24h Range):  Temp:  [97.6 °F (36.4 °C)-98.4 °F (36.9 °C)] 98.4 °F (36.9 °C)  Pulse:  [68-93] 68  Resp:  [15-21] 18  SpO2:  [92 %-97 %] 92 %  BP: ()/(53-74) 107/57      GEN = alert/oriented, nad  HEENT = sclera anicteric, EOM grossly  normal  BREASTS = deferred, no concerns  CV = BP and HR as per vitals  PULM = normal respiratory effort   =      External: nefg, no lesions, right labia is draining blood-mucous discharge, no fullness inferior to small skin opening, mild fullness supero-laterally, no erythema / edema / crepitus

## 2024-10-23 ENCOUNTER — PATIENT MESSAGE (OUTPATIENT)
Dept: OBSTETRICS AND GYNECOLOGY | Facility: CLINIC | Age: 69
End: 2024-10-23
Payer: MEDICARE

## 2024-10-23 ENCOUNTER — PATIENT MESSAGE (OUTPATIENT)
Dept: HEMATOLOGY/ONCOLOGY | Facility: CLINIC | Age: 69
End: 2024-10-23
Payer: MEDICARE

## 2024-10-24 DIAGNOSIS — I82.890 ACUTE DEEP VEIN THROMBOSIS (DVT) OF PELVIC VEIN: Primary | ICD-10-CM

## 2024-11-06 ENCOUNTER — PATIENT OUTREACH (OUTPATIENT)
Dept: ADMINISTRATIVE | Facility: OTHER | Age: 69
End: 2024-11-06

## 2024-11-06 NOTE — PROGRESS NOTES
CHW - Case Closure    This Community Health Worker spoke to patient via telephone today.   Pt/Caregiver reported: Unable to contact the pt. LVM if in need of info on resources to give me a call.  Pt/Caregiver denied any additional needs at this time and agrees with episode closure at this time.  Provided patient with Community Health Worker's contact information and encouraged him/her to contact this Community Health Worker if additional needs arise.            CHW - Outreach Attempt    Community Health Worker left a voicemail message for 2nd attempt to contact patient regarding: LVM to notify pt that I was following up from the last time we spoke. 2nd attempt.   Community Health Worker to attempt to contact patient on:   11/27/24

## 2024-12-30 DIAGNOSIS — I82.890 ACUTE DEEP VEIN THROMBOSIS (DVT) OF PELVIC VEIN: ICD-10-CM

## 2025-02-04 DIAGNOSIS — I82.890 ACUTE DEEP VEIN THROMBOSIS (DVT) OF PELVIC VEIN: Primary | ICD-10-CM

## 2025-02-10 ENCOUNTER — TELEPHONE (OUTPATIENT)
Dept: HEMATOLOGY/ONCOLOGY | Facility: CLINIC | Age: 70
End: 2025-02-10
Payer: MEDICARE

## 2025-02-10 NOTE — TELEPHONE ENCOUNTER
Lft VM to confirm lab appt for 2/12 @ 9:40 am, CT for 2/13 @ 10:00 am and appt with Dr. Merino for 2/17 @ 11:40 am.

## 2025-02-12 ENCOUNTER — LAB VISIT (OUTPATIENT)
Dept: LAB | Facility: HOSPITAL | Age: 70
End: 2025-02-12
Attending: INTERNAL MEDICINE
Payer: MEDICARE

## 2025-02-12 DIAGNOSIS — I82.890 ACUTE DEEP VEIN THROMBOSIS (DVT) OF PELVIC VEIN: ICD-10-CM

## 2025-02-12 LAB
ALBUMIN SERPL BCP-MCNC: 4.3 G/DL (ref 3.5–5.2)
ALP SERPL-CCNC: 56 U/L (ref 55–135)
ALT SERPL W/O P-5'-P-CCNC: 9 U/L (ref 10–44)
ANION GAP SERPL CALC-SCNC: 7 MMOL/L (ref 8–16)
AST SERPL-CCNC: 14 U/L (ref 10–40)
BASOPHILS # BLD AUTO: 0.07 K/UL (ref 0–0.2)
BASOPHILS NFR BLD: 0.7 % (ref 0–1.9)
BILIRUB SERPL-MCNC: 0.7 MG/DL (ref 0.1–1)
BUN SERPL-MCNC: 8 MG/DL (ref 8–23)
CALCIUM SERPL-MCNC: 9.6 MG/DL (ref 8.7–10.5)
CHLORIDE SERPL-SCNC: 93 MMOL/L (ref 95–110)
CO2 SERPL-SCNC: 35 MMOL/L (ref 23–29)
CREAT SERPL-MCNC: 0.8 MG/DL (ref 0.5–1.4)
DIFFERENTIAL METHOD BLD: ABNORMAL
EOSINOPHIL # BLD AUTO: 0.2 K/UL (ref 0–0.5)
EOSINOPHIL NFR BLD: 2 % (ref 0–8)
ERYTHROCYTE [DISTWIDTH] IN BLOOD BY AUTOMATED COUNT: 13.3 % (ref 11.5–14.5)
EST. GFR  (NO RACE VARIABLE): >60 ML/MIN/1.73 M^2
GLUCOSE SERPL-MCNC: 133 MG/DL (ref 70–110)
HCT VFR BLD AUTO: 46.2 % (ref 37–48.5)
HGB BLD-MCNC: 14.6 G/DL (ref 12–16)
IMM GRANULOCYTES # BLD AUTO: 0.04 K/UL (ref 0–0.04)
IMM GRANULOCYTES NFR BLD AUTO: 0.4 % (ref 0–0.5)
LYMPHOCYTES # BLD AUTO: 2.8 K/UL (ref 1–4.8)
LYMPHOCYTES NFR BLD: 27.4 % (ref 18–48)
MCH RBC QN AUTO: 28.2 PG (ref 27–31)
MCHC RBC AUTO-ENTMCNC: 31.6 G/DL (ref 32–36)
MCV RBC AUTO: 89 FL (ref 82–98)
MONOCYTES # BLD AUTO: 0.9 K/UL (ref 0.3–1)
MONOCYTES NFR BLD: 9.1 % (ref 4–15)
NEUTROPHILS # BLD AUTO: 6.2 K/UL (ref 1.8–7.7)
NEUTROPHILS NFR BLD: 60.4 % (ref 38–73)
NRBC BLD-RTO: 0 /100 WBC
PLATELET # BLD AUTO: 311 K/UL (ref 150–450)
PMV BLD AUTO: 11 FL (ref 9.2–12.9)
POTASSIUM SERPL-SCNC: 3.7 MMOL/L (ref 3.5–5.1)
PROT SERPL-MCNC: 7.5 G/DL (ref 6–8.4)
RBC # BLD AUTO: 5.18 M/UL (ref 4–5.4)
SODIUM SERPL-SCNC: 135 MMOL/L (ref 136–145)
WBC # BLD AUTO: 10.31 K/UL (ref 3.9–12.7)

## 2025-02-12 PROCEDURE — 36415 COLL VENOUS BLD VENIPUNCTURE: CPT | Performed by: INTERNAL MEDICINE

## 2025-02-12 PROCEDURE — 80053 COMPREHEN METABOLIC PANEL: CPT | Performed by: INTERNAL MEDICINE

## 2025-02-12 PROCEDURE — 85025 COMPLETE CBC W/AUTO DIFF WBC: CPT | Performed by: INTERNAL MEDICINE

## 2025-02-13 ENCOUNTER — HOSPITAL ENCOUNTER (OUTPATIENT)
Dept: RADIOLOGY | Facility: HOSPITAL | Age: 70
Discharge: HOME | End: 2025-02-13
Attending: INTERNAL MEDICINE
Payer: MEDICARE

## 2025-02-13 DIAGNOSIS — I82.890 ACUTE DEEP VEIN THROMBOSIS (DVT) OF PELVIC VEIN: ICD-10-CM

## 2025-02-13 PROCEDURE — 74177 CT ABD & PELVIS W/CONTRAST: CPT | Mod: 26,,, | Performed by: RADIOLOGY

## 2025-02-13 PROCEDURE — 74177 CT ABD & PELVIS W/CONTRAST: CPT | Mod: TC

## 2025-02-13 PROCEDURE — 25500020 PHARM REV CODE 255: Performed by: INTERNAL MEDICINE

## 2025-02-13 RX ADMIN — IOHEXOL 100 ML: 350 INJECTION, SOLUTION INTRAVENOUS at 01:02

## 2025-02-17 ENCOUNTER — OFFICE VISIT (OUTPATIENT)
Dept: HEMATOLOGY/ONCOLOGY | Facility: CLINIC | Age: 70
End: 2025-02-17
Payer: MEDICARE

## 2025-02-17 VITALS
TEMPERATURE: 98 F | DIASTOLIC BLOOD PRESSURE: 74 MMHG | SYSTOLIC BLOOD PRESSURE: 149 MMHG | BODY MASS INDEX: 22.89 KG/M2 | OXYGEN SATURATION: 93 % | HEIGHT: 69 IN | WEIGHT: 154.56 LBS | HEART RATE: 72 BPM | RESPIRATION RATE: 18 BRPM

## 2025-02-17 DIAGNOSIS — I10 ESSENTIAL HYPERTENSION: ICD-10-CM

## 2025-02-17 DIAGNOSIS — I82.890 ACUTE DEEP VEIN THROMBOSIS (DVT) OF PELVIC VEIN: Primary | ICD-10-CM

## 2025-02-17 DIAGNOSIS — E43 UNSPECIFIED SEVERE PROTEIN-CALORIE MALNUTRITION: ICD-10-CM

## 2025-02-17 NOTE — PROGRESS NOTES
Subjective:       Patient ID: Abbey Hagan is a 69 y.o. female.    Chief Complaint:  f/u of thrombosis    HPI  69-year-old female found to have pelvic deep vein thrombosis started on Lovenox changed to rivaroxaban discharged home and sent to Hematology Clinic   No dvt prev. No family hx  No one else in family on blood thinners  No misccariages    Review of Systems   Constitutional:  Positive for appetite change. Negative for unexpected weight change.   HENT:  Negative for mouth sores.    Eyes:  Negative for visual disturbance.   Respiratory:  Positive for shortness of breath. Negative for cough.    Cardiovascular:  Negative for chest pain.   Gastrointestinal:  Positive for abdominal pain. Negative for diarrhea.   Genitourinary:  Negative for frequency.   Musculoskeletal:  Positive for back pain.   Skin:  Negative for rash.   Neurological:  Negative for headaches.   Hematological:  Negative for adenopathy.   Psychiatric/Behavioral:  The patient is nervous/anxious.              Past Medical History:   Diagnosis Date    Anxiety     Arthritis     back, neck and knees    Depression     Emphysema of lung     HSV infection     Hypertension     S/P total hip arthroplasty 01/12/2016     Past Surgical History:   Procedure Laterality Date    COLONOSCOPY N/A 10/26/2020    Procedure: COLONOSCOPY;  Surgeon: Gerard Hernandez MD;  Location: [facility];  Service: Endoscopy;  Laterality: N/A;    HIP ARTHROPLASTY      bilat    JOINT REPLACEMENT      bilat hip    TRANSFORAMINAL EPIDURAL INJECTION OF STEROID Bilateral 4/6/2021    Procedure: Injection,steroid,epidural,transforaminal approach;  Surgeon: Scott Barger MD;  Location: [facility] OR;  Service: Pain Management;  Laterality: Bilateral;  L4-5     Family History   Problem Relation Name Age of Onset    COPD Mother      Hypertension Mother      Heart disease Mother      Alcohol abuse Father      Depression Sister      Hypertension Sister      Colon cancer Maternal Grandmother       Breast cancer Paternal Aunt      Ovarian cancer Neg Hx      Psoriasis Neg Hx      Lupus Neg Hx      Eczema Neg Hx      Melanoma Neg Hx        Social History     Socioeconomic History    Marital status: Single   Occupational History     Employer: [employer]   Tobacco Use    Smoking status: Former     Current packs/day: 0.00     Average packs/day: 0.5 packs/day for 40.0 years (20.0 ttl pk-yrs)     Types: Cigarettes     Start date: 9/28/1981     Quit date: 9/28/2021     Years since quitting: 3.3    Smokeless tobacco: Never    Tobacco comments:     Patient down to 8 cigs a day   Substance and Sexual Activity    Alcohol use: Yes     Comment: socially    Drug use: Yes     Types: Oxycodone, Marijuana    Sexual activity: Not Currently     Social Drivers of Health     Financial Resource Strain: Low Risk  (2/15/2025)    Overall Financial Resource Strain (CARDIA)     Difficulty of Paying Living Expenses: Not hard at all   Food Insecurity: No Food Insecurity (2/15/2025)    Hunger Vital Sign     Worried About Running Out of Food in the Last Year: Never true     Ran Out of Food in the Last Year: Never true   Transportation Needs: No Transportation Needs (2/15/2025)    PRAPARE - Transportation     Lack of Transportation (Medical): No     Lack of Transportation (Non-Medical): No   Physical Activity: Inactive (2/15/2025)    Exercise Vital Sign     Days of Exercise per Week: 0 days     Minutes of Exercise per Session: 0 min   Stress: No Stress Concern Present (2/15/2025)    [employer] [place] [employer] - Occupational Stress Questionnaire     Feeling of Stress : Only a little   Housing Stability: Low Risk  (2/15/2025)    Housing Stability Vital Sign     Unable to Pay for Housing in the Last Year: No     Number of Times Moved in the Last Year: 0     Homeless in the Last Year: No     Review of patient's allergies indicates:   Allergen Reactions    Penicillin g      Childhood allergy--unknown reaction    Sulfa (sulfonamide  antibiotics)      Childhood allergy--unknown reaction       Current Outpatient Medications:     acetaminophen (TYLENOL 8 HOUR ORAL), Take by mouth daily as needed., Disp: , Rfl:     albuterol (VENTOLIN HFA) 90 mcg/actuation inhaler, Inhale 2 puffs into the lungs every 4 (four) hours as needed for Wheezing or Shortness of Breath. Rescue, Disp: 18 g, Rfl: 11    albuterol-ipratropium (DUO-NEB) 2.5 mg-0.5 mg/3 mL nebulizer solution, Take 3 mLs by nebulization every 6 (six) hours as needed for Wheezing or Shortness of Breath. Rescue, Disp: 1 Box, Rfl: 11    ascorbic acid, vitamin C, (VITAMIN C) 100 MG tablet, Take 100 mg by mouth once daily., Disp: , Rfl:     CHOLECALCIFEROL, VITAMIN D3, (VITAMIN D-3 ORAL), No Sig Provided, Disp: , Rfl:     dicyclomine (BENTYL) 20 mg tablet, Take 20 mg by mouth 2 (two) times daily., Disp: , Rfl:     DULoxetine (CYMBALTA) 60 MG capsule, Take 60 mg by mouth once daily., Disp: , Rfl:     EScitalopram oxalate (LEXAPRO) 20 MG tablet, TAKE 1 TABLET(20 MG) BY MOUTH EVERY DAY, Disp: 90 tablet, Rfl: 0    fluticasone-umeclidin-vilanter (TRELEGY ELLIPTA) 100-62.5-25 mcg DsDv, Inhale 1 puff into the lungs once daily., Disp: 60 each, Rfl: 11    gabapentin (NEURONTIN) 300 MG capsule, Take 2 capsules (600 mg total) by mouth every evening., Disp: 180 capsule, Rfl: 2    lisinopriL-hydrochlorothiazide (PRINZIDE,ZESTORETIC) 10-12.5 mg per tablet, TAKE 1 TABLET BY MOUTH EVERY DAY, Disp: 90 tablet, Rfl: 3    multivitamin capsule, Take 1 capsule by mouth once daily., Disp: , Rfl:     POTASSIUM/MAGNESIUM (MAGNESIUM-POTASSIUM ORAL), Take 1 tablet by mouth once daily. Every day (Patient not taking: Reported on 10/22/2024), Disp: , Rfl:     predniSONE (DELTASONE) 20 MG tablet, Take 1 tablet (20 mg total) by mouth once daily. Take one daily for 5 days, repeat for breathing problems. (Patient not taking: Reported on 10/22/2024), Disp: 25 tablet, Rfl: 2    rivaroxaban (XARELTO) 10 mg Tab, Take 1 tablet (10 mg total)  by mouth daily with dinner or evening meal., Disp: 90 tablet, Rfl: 3    traMADoL (ULTRAM) 50 mg tablet, Take 50 mg by mouth every 8 (eight) hours as needed., Disp: , Rfl:     vitamin B complex (B COMPLEX 1 ORAL), Take by mouth., Disp: , Rfl:     Physical Exam    Wt Readings from Last 3 Encounters:   10/22/24 71.2 kg (157 lb 1.2 oz)   10/16/24 69.9 kg (154 lb 1.6 oz)   09/28/24 70.7 kg (155 lb 13.8 oz)     Temp Readings from Last 3 Encounters:   10/16/24 96.5 °F (35.8 °C) (Temporal)   10/01/24 98.3 °F (36.8 °C) (Oral)   11/08/21 98.6 °F (37 °C)     BP Readings from Last 3 Encounters:   10/22/24 126/88   10/16/24 120/65   10/01/24 127/63     Pulse Readings from Last 3 Encounters:   10/16/24 68   10/01/24 75   03/30/22 82    VITAL SIGNS:  as above   GENERAL: appears well-built, well-nourished.  No anxiety, no agitation, and in no distress.  Patient is awake, alert, oriented and cooperative.  HEENT:  Showed no congestion. Trachea is central no obvious icterus or pallor noted no hoarseness. no obvious JVD   NECK:  Supple.  No JVD. No obvious cervical submental or supraclavicular adenopathy.  RS:the visualized portion of  Chest expands well. chest appears symmetric, no audible wheezes.  No dyspnea recognized  ABDOMEN:  abdomen appears undistended.  EXTREMITIES:  Without edema.  NEUROLOGICAL:  The patient is appropriate, higher functions are normal.  No  obvious neurological deficits.  normal judgement normal thought content  No confusion, no speech impediment. Cranial nerves are intact and show no deficit. No gross motor deficits noted   SKIN MUSCULOSKELETAL: no joint or skeletal deformity, no clubbing of nails.  No visible rash ecchymosis or petechiae     Lab Results   Component Value Date    WBC 10.31 02/12/2025    HGB 14.6 02/12/2025    HCT 46.2 02/12/2025    MCV 89 02/12/2025     02/12/2025       BMP  Lab Results   Component Value Date     (L) 02/12/2025    K 3.7 02/12/2025    CL 93 (L) 02/12/2025    CO2  35 (H) 02/12/2025    BUN 8 02/12/2025    CREATININE 0.8 02/12/2025    CALCIUM 9.6 02/12/2025    ANIONGAP 7 (L) 02/12/2025    ESTGFRAFRICA >60.0 03/30/2022    EGFRNONAA >60.0 03/30/2022       Impression:     There is a 7.0 x 2.0 cm abscess in the right labia.     The there is a 6.3 cm low-attenuation lesion seen within the left adnexa possibly representing cyst.  Nonemergent pelvic ultrasound for further evaluation is recommended.     There is thrombosis of the deep right pelvic vein.     This report was flagged in Epic as abnormal.    Impression:     7.0 x 3.4 cm cystic mass in the left adnexa which has slightly enlarged when compared to the prior study     Prior hysterectomy     Colonic diverticulosis     No evidence of cecal mass     Simple hepatic cysts and right renal cysts     Degenerative changes of the spine  Patient Active Problem List   Diagnosis    Essential hypertension    COPD with asthma    Primary osteoarthritis involving multiple joints    Anxiety and depression    Tobacco abuse    History of colon polyps    Tinnitus, bilateral    Decreased hearing of both ears    Colitis    Lumbar radiculitis    Labial abscess    Acute deep vein thrombosis (DVT) of pelvic vein        Assessment and Plan     Deep right pelvic pain thrombosis patient on rivaroxaban  Advised to continue the same for at least 3-6 months restudy  Discussed  bridging with pt repeat study does not reveal the same thrombosis she should be able to come off of anticoagulant and be checked for hypercoagulability see me in 6 weeks with hypercoag w/u she just picked up a month suply she will finish and when she is off will draw hypercoag    Has pelvic mass on CT scan referred to gyn ASAP she sees Dr brown    Patient has chronic back pain.  And neck pain has follow-up with primary care has seen orthopedic neurology  Hypertension patient is on lisinopril good control continue medication and diet  MDM includes  :    - Acute or chronic illness or  injury that poses a threat to life or bodily function  - Independent review and explanation of 3+ results from unique tests  - Discussion of management and ordering 3+ unique tests  - Extensive discussion of treatment and management  - Prescription drug management  - Drug therapy requiring intensive monitoring for toxicity          Xray Ankle Complete 3 Views, Right

## 2025-02-18 ENCOUNTER — TELEPHONE (OUTPATIENT)
Dept: OBSTETRICS AND GYNECOLOGY | Facility: CLINIC | Age: 70
End: 2025-02-18
Payer: MEDICARE

## 2025-02-18 NOTE — TELEPHONE ENCOUNTER
----- Message from Digna Simpson MD sent at 2/18/2025  1:04 PM CST -----  I see she is scheduled for f/u with me, but she needs to have a f/u pelvic ultrasound before that appointment.  Please help her get scheduled.  ----- Message -----  From: Digna Simpson MD  Sent: 2/10/2025  12:00 AM CST  To: Digna Simpson MD    Did she get pelvic US?

## 2025-02-18 NOTE — TELEPHONE ENCOUNTER
SKYEM for pt to contact office to schedule follow up ultrasound before her appt with Dr. Simpson on 3/12/25.

## 2025-02-19 ENCOUNTER — HOSPITAL ENCOUNTER (OUTPATIENT)
Dept: RADIOLOGY | Facility: HOSPITAL | Age: 70
Discharge: HOME | End: 2025-02-19
Attending: GENERAL PRACTICE
Payer: MEDICARE

## 2025-02-19 DIAGNOSIS — N83.209 CYST OF OVARY, UNSPECIFIED LATERALITY: ICD-10-CM

## 2025-02-19 PROCEDURE — 76856 US EXAM PELVIC COMPLETE: CPT | Mod: TC

## 2025-02-20 ENCOUNTER — TELEPHONE (OUTPATIENT)
Dept: OBSTETRICS AND GYNECOLOGY | Facility: CLINIC | Age: 70
End: 2025-02-20
Payer: MEDICARE

## 2025-02-26 ENCOUNTER — TELEPHONE (OUTPATIENT)
Dept: OBSTETRICS AND GYNECOLOGY | Facility: CLINIC | Age: 70
End: 2025-02-26
Payer: MEDICARE

## 2025-04-15 ENCOUNTER — TELEPHONE (OUTPATIENT)
Dept: HEMATOLOGY/ONCOLOGY | Facility: CLINIC | Age: 70
End: 2025-04-15
Payer: MEDICARE

## 2025-04-16 ENCOUNTER — PATIENT MESSAGE (OUTPATIENT)
Dept: HEMATOLOGY/ONCOLOGY | Facility: CLINIC | Age: 70
End: 2025-04-16
Payer: MEDICARE

## 2025-04-16 ENCOUNTER — OFFICE VISIT (OUTPATIENT)
Dept: OBSTETRICS AND GYNECOLOGY | Facility: CLINIC | Age: 70
End: 2025-04-16
Payer: MEDICARE

## 2025-04-16 VITALS
HEIGHT: 69 IN | WEIGHT: 152.13 LBS | SYSTOLIC BLOOD PRESSURE: 140 MMHG | DIASTOLIC BLOOD PRESSURE: 80 MMHG | BODY MASS INDEX: 22.53 KG/M2 | HEART RATE: 88 BPM

## 2025-04-16 DIAGNOSIS — Z12.31 SCREENING MAMMOGRAM FOR BREAST CANCER: ICD-10-CM

## 2025-04-16 DIAGNOSIS — N83.299 COMPLEX OVARIAN CYST: Primary | ICD-10-CM

## 2025-04-16 DIAGNOSIS — D39.10 NEOPLASM OF UNCERTAIN BEHAVIOR OF OVARY, UNSPECIFIED LATERALITY: ICD-10-CM

## 2025-04-16 PROCEDURE — 99999 PR PBB SHADOW E&M-EST. PATIENT-LVL IV: CPT | Mod: PBBFAC,,, | Performed by: GENERAL PRACTICE

## 2025-04-16 PROCEDURE — 3079F DIAST BP 80-89 MM HG: CPT | Mod: CPTII,S$GLB,, | Performed by: GENERAL PRACTICE

## 2025-04-16 PROCEDURE — 3288F FALL RISK ASSESSMENT DOCD: CPT | Mod: CPTII,S$GLB,, | Performed by: GENERAL PRACTICE

## 2025-04-16 PROCEDURE — 1159F MED LIST DOCD IN RCRD: CPT | Mod: CPTII,S$GLB,, | Performed by: GENERAL PRACTICE

## 2025-04-16 PROCEDURE — 3008F BODY MASS INDEX DOCD: CPT | Mod: CPTII,S$GLB,, | Performed by: GENERAL PRACTICE

## 2025-04-16 PROCEDURE — 1101F PT FALLS ASSESS-DOCD LE1/YR: CPT | Mod: CPTII,S$GLB,, | Performed by: GENERAL PRACTICE

## 2025-04-16 PROCEDURE — 99213 OFFICE O/P EST LOW 20 MIN: CPT | Mod: S$GLB,,, | Performed by: GENERAL PRACTICE

## 2025-04-16 PROCEDURE — 3077F SYST BP >= 140 MM HG: CPT | Mod: CPTII,S$GLB,, | Performed by: GENERAL PRACTICE

## 2025-04-16 NOTE — PROGRESS NOTES
Background   2024 (in hospital)  Abbey Hagan is a 69 y.o. yo  who presented to the ER yesterday with what she thought was rectal bleeding.  She had pain and swelling on her bottom.  69 y.o.  with draining right vulvar abscess.  No surrounding cellulitis.  Patient non-toxic.  Incidental finding of 6cm left adnexal cyst and thrombosis of deep right pelvic vein.    10/22/2024  Abbey Hagan is a 69 y.o. here for WWE and follow-up from recent hospitalization for vulvar abscess.  Had incidental finding of ovarian cyst.  No blood, no pain from bottom - feels as though things have healed.       ASSESSMENT AND PLAN   2025  69 y.o.  with ovarian cyst incidentally found during a hospitalization in SEP 2024.  Since then the cyst is a little bigger and now shows a 1.5cm mural nodule.  No pelvic lymphadenopathy per recent CT.    Discussed new concerning findings.  ERROL test ordered and GYN-ONC referral placed.  Mammogram: ordered and patient will schedule  Return to clinic: as needed    Leslie L Weeks, MD [facility]    SUBJECTIVE   2025  Abbey Hagan is a 69 y.o. here for f/u after recent ultrasound.  Had to cancel her last appointment b/c she was sick.  She has resumed smoking.  Rare alcohol.     G'sP's:   LMP: age 39  Relationship: single and not having sex  PAP Hx: no h/o abnormals  MMG (24) = negative  HRT:  used for about 1 year only at menopause but not since   BLEEDING: denies       OBJECTIVE   PHYSICAL EXAM  Vitals:    25 1101   BP: (!) 140/80   Pulse: 88     GEN = alert/oriented, nad, pleasant  HEENT = sclera anicteric, EOM grossly normal  BREASTS = deferred, no concerns  CV = BP and HR as per vitals  PULM = normal respiratory effort   = deferred, no acute concerns    LABS AND RADS    Lab Results   Component Value Date    WBC 10.31 2025    HGB 14.6 2025    HCT 46.2 2025     2025    MCV 89 2025  "     No results found for: "LABURIN"  Lab Results   Component Value Date    HEPCAB Negative 08/25/2016    HZW91CPMY Negative 09/01/2020     No results found for: "LABCHLA", "LABNGO"  Lab Results   Component Value Date    TSH 0.771 10/05/2022      PELVIC US (10/8/24) = (patient declined vaginal ultrasound)  Limited visualization of the uterus was achieved, with the uterus not evaluated.  ROV not seen  A cyst is present in the left adnexa measuring 7.5 x 5.4 x 5.6 cm. This demonstrates a mildly thickened septation. No associated internal color Doppler blood flow. The left ovary is not identified separate from the cyst.     PELVIC US (2/19/2025) =  The uterus and right ovary not identified.   In the left adnexa, there is a 7.4 x 5.5 x 4.8 cm cyst exhibiting a 1.5 cm mural nodule and at least 1 thin septation.  No internal color Doppler blood flow.   Impression:   Complex left adnexal region cyst.  An ovarian neoplasm cannot be excluded.  Further evaluation with MRI with and without contrast is recommended.       CT ABD/PELVIS (2/13/2025)  Reproductive Organs: The uterus is absent.  There is a 7.0 x 3.4 cm cystic mass in the left adnexa which previously measured 6.3 cm.  There is no pelvic adenopathy.  Due to timing of contrast bolus the venous structures in the pelvis were not opacified.     Pelvic Lymph nodes: There is no pelvic adenopathy.  There is resolution of the abscess in the right labia.     7.0 x 3.4 cm cystic mass in the left adnexa which has slightly enlarged when compared to the prior study      HISTORY   Date taken or verified: 04/16/2025     GYNECOLOGIC HISTORY  PAP Hx: no h/o abnormals  Genital HSV: Yes  Other STD Hx: denies    Menarche: 14yoa  Pain -- Non-menstrual pelvic pain: No / Dyspareunia: n/a  Other -- Vasomotor Sxs: denies / Vaginal dryness: denies    OBSTETRIC HISTORY  IAB: 1    SOCIAL HISTORY  Lives with: self  Smoker: former smoker (quit AUG 2024, had been 50yrs) / Alcohol:  sober x several " years  / Drugs: denies  Domestic Violence: No  Occupation:  retired     FAMILY HISTORY  BLEEDING or CLOTTING DISORDERS: none  BREAST CA: grandmother / UTERINE CA: none / OVARIAN CA: none  COLON CA: none     PAST MEDICAL HISTORY  -------------------------------------    Anxiety    Arthritis    back, neck and knees    Depression    Emphysema of lung    HSV infection    Hypertension    S/P total hip arthroplasty     PAST SURGICAL HISTORY  ----------------------------    Colonoscopy    Procedure: COLONOSCOPY;  Surgeon: Gerard Hernandez MD;  Location: [facility];  Service: Endoscopy;  Laterality: N/A;    Dilation and curettage of uterus    Hip arthroplasty    bilat    Joint replacement    bilat hip    Transforaminal epidural injection of steroid    Procedure: Injection,steroid,epidural,transforaminal approach;  Surgeon: Scott Barger MD;  Location: [facility];  Service: Pain Management;  Laterality: Bilateral;  L4-5     ALLERGIES  Review of patient's allergies indicates:   Allergen Reactions    Penicillin g      Childhood allergy--unknown reaction    Sulfa (sulfonamide antibiotics)      Childhood allergy--unknown reaction       MEDICATIONS  Current Outpatient Medications   Medication Instructions    acetaminophen (TYLENOL 8 HOUR ORAL) Daily PRN    albuterol (VENTOLIN HFA) 90 mcg/actuation inhaler 2 puffs, Inhalation, Every 4 hours PRN, Rescue    albuterol-ipratropium (DUO-NEB) 2.5 mg-0.5 mg/3 mL nebulizer solution 3 mLs, Nebulization, Every 6 hours PRN, Rescue    ascorbic acid (vitamin C) (VITAMIN C) 100 mg, Daily    CHOLECALCIFEROL, VITAMIN D3, (VITAMIN D-3 ORAL) No Sig Provided    dicyclomine (BENTYL) 20 mg, 2 times daily    fluticasone-umeclidin-vilanter (TRELEGY ELLIPTA) 100-62.5-25 mcg DsDv 1 puff, Inhalation, Daily    gabapentin (NEURONTIN) 600 mg, Oral, Nightly    lisinopriL-hydrochlorothiazide (PRINZIDE,ZESTORETIC) 10-12.5 mg per tablet TAKE 1 TABLET BY MOUTH EVERY DAY    multivitamin capsule 1 capsule,  Daily    POTASSIUM/MAGNESIUM (MAGNESIUM-POTASSIUM ORAL) 1 tablet, Daily    traMADoL (ULTRAM) 50 mg, Every 8 hours PRN    vitamin B complex (B COMPLEX 1 ORAL) Take by mouth.

## 2025-04-16 NOTE — PATIENT INSTRUCTIONS
I have placed a referral for you to see GYN-Oncology for further evaluation of the ovarian cyst.    Please expect a phone call from their office to get you scheduled.    If you haven't heard anything in a week, feel free to call Ms. Page Puente at [phone].  She is the patient coordinator for the GYN-ONC clinic.    Sincerely,  Dr. Simpson

## 2025-04-17 ENCOUNTER — LAB VISIT (OUTPATIENT)
Dept: LAB | Facility: HOSPITAL | Age: 70
End: 2025-04-17
Attending: GENERAL PRACTICE
Payer: MEDICARE

## 2025-04-17 DIAGNOSIS — N83.299 COMPLEX OVARIAN CYST: ICD-10-CM

## 2025-04-17 DIAGNOSIS — D39.10 NEOPLASM OF UNCERTAIN BEHAVIOR OF OVARY, UNSPECIFIED LATERALITY: ICD-10-CM

## 2025-04-17 PROCEDURE — 86304 IMMUNOASSAY TUMOR CA 125: CPT

## 2025-04-17 PROCEDURE — 36415 COLL VENOUS BLD VENIPUNCTURE: CPT

## 2025-04-18 LAB
CANCER AG125 SERPL IA-ACNC: 30 U/ML
HE4 SERPL-SCNC: 97 PMOL/L
ROMA SCORE POSTMEN SERPL: 3.01
ROMA SCORE PREMEN SERPL: 2.89

## 2025-04-21 ENCOUNTER — TELEPHONE (OUTPATIENT)
Dept: GYNECOLOGIC ONCOLOGY | Facility: CLINIC | Age: 70
End: 2025-04-21
Payer: MEDICARE

## 2025-04-21 NOTE — NURSING
New pt referral received from Dr Simpson for left adnexal cyts on imaging and for pt to be seen with GYN/ONC. LVM with for a call back to arrange new pt appointment. Direct navigator phone number provided to pt [phone]    Oncology Navigation   Intake  Cancer Type: Gynecologic (left adnexal cyst)  Type of Referral: Internal (Dr Digna Simpson)  Date of Referral: 04/16/25  Initial Nurse Navigator Contact: 04/21/25  Referral to Initial Contact Timeline (days): 5  Appointment Date: 10/16/24     Treatment  Current Status: Staging work-up     Procedures: Ultrasound; CT  CT Schedule Date: 02/13/25  Ultrasound Schedule Date: 02/19/25     Providers:  PCP- Dr. Noy Michaud  OBESTEVAN- Dr. Digna Simspon  HEM/ONC- Dr Daisy Merino  Gastroenterology- Dr Heena Gerardo/ Gerard Hernandez  Cardiology-   Pulmonology- Dr Gabe Cruz  Orthopedic- Dr Sandip Sorto  Pain medicine- Dr Scott Barger    Medical History:  DVT- Rivaroxaban  Anxiety/ depression  Arthritis (back, neck and knees)  Emphysema  smoker  HSV  HTN    9/28/24- CT Abdomen and Pelvis with IV contrast  There is a 7.0 x 2.0 cm abscess in the right labia.  The there is a 6.3 cm low-attenuation lesion seen within the left adnexa possibly representing cyst. Nonemergent pelvic ultrasound for further evaluation is recommended.  There is thrombosis of the deep right pelvic vein.    10/8/24- Ultrasound  Limited visualization of the uterus was achieved, with the uterus not evaluated.  The right ovary is not seen.  A cyst is present in the left adnexa measuring 7.5 x 5.4 x 5.6 cm. This demonstrates a mildly thickened septation. No associated internal color Doppler blood flow. The left ovary is not identified separate from the cyst.    10/22/25- Visit with Dr Simpson. Pt was in ED for rectal bleeding and pain/swelling in her bottom. Vulvar abscess found and an incidental ovarian cyst noted on imaging. Right labia is draining blood-mucous discharge, no fullness inferior to small skin opening, mild  fullness supero-laterally, no erythema / edema / crepitus.    2/13/25- CT abdomen and Pelvis with IV contrast  The uterus is absent. There is a 7.0 x 3.4 cm cystic mass in the left adnexa which previously measured 6.3 cm. There is no pelvic adenopathy. Due to timing of contrast bolus the venous structures in the pelvis were not opacified.    2/19/25- Ultrasound  The uterus and right ovary not identified.  In the left adnexa, there is a 7.4 x 5.5 x 4.8 cm cyst exhibiting a 1.5 cm mural nodule and at least 1 thin septation. No internal color Doppler blood flow.      Acuity      Follow Up  No follow-ups on file.

## 2025-04-24 NOTE — NURSING
LATE ENTRY:     Pt returned navigators call for scheduling from referral.  Explained my role as nurse navigator and direct number provided. Options for GYN/ONC providers, at all clinic locations and soonest availability discussed with pt. Pt scheduled to see Dr Tejada in the next available appointment. Patient encouraged to call for any questions or concerns about her care or appointments. Pt verbalized understanding. Date time and location of appointment reviewed with pt.

## 2025-04-28 DIAGNOSIS — Z12.31 BREAST CANCER SCREENING BY MAMMOGRAM: Primary | ICD-10-CM

## 2025-04-30 ENCOUNTER — LAB VISIT (OUTPATIENT)
Dept: LAB | Facility: HOSPITAL | Age: 70
End: 2025-04-30
Attending: INTERNAL MEDICINE
Payer: MEDICARE

## 2025-04-30 DIAGNOSIS — I82.890 ACUTE DEEP VEIN THROMBOSIS (DVT) OF PELVIC VEIN: ICD-10-CM

## 2025-04-30 DIAGNOSIS — E43 UNSPECIFIED SEVERE PROTEIN-CALORIE MALNUTRITION: ICD-10-CM

## 2025-04-30 PROCEDURE — 85613 RUSSELL VIPER VENOM DILUTED: CPT

## 2025-04-30 PROCEDURE — 36415 COLL VENOUS BLD VENIPUNCTURE: CPT

## 2025-04-30 PROCEDURE — 85306 CLOT INHIBIT PROT S FREE: CPT

## 2025-04-30 PROCEDURE — 81241 F5 GENE: CPT

## 2025-04-30 PROCEDURE — 85300 ANTITHROMBIN III ACTIVITY: CPT

## 2025-04-30 PROCEDURE — 85302 CLOT INHIBIT PROT C ANTIGEN: CPT

## 2025-04-30 PROCEDURE — 85303 CLOT INHIBIT PROT C ACTIVITY: CPT

## 2025-04-30 PROCEDURE — 83090 ASSAY OF HOMOCYSTEINE: CPT

## 2025-04-30 PROCEDURE — 85306 CLOT INHIBIT PROT S FREE: CPT | Mod: 59

## 2025-05-01 ENCOUNTER — OFFICE VISIT (OUTPATIENT)
Dept: GYNECOLOGIC ONCOLOGY | Facility: CLINIC | Age: 70
End: 2025-05-01
Payer: MEDICARE

## 2025-05-01 VITALS
WEIGHT: 149.69 LBS | SYSTOLIC BLOOD PRESSURE: 132 MMHG | HEART RATE: 100 BPM | OXYGEN SATURATION: 94 % | HEIGHT: 69 IN | RESPIRATION RATE: 16 BRPM | DIASTOLIC BLOOD PRESSURE: 84 MMHG | BODY MASS INDEX: 22.17 KG/M2 | TEMPERATURE: 97 F

## 2025-05-01 DIAGNOSIS — N83.299 COMPLEX OVARIAN CYST: ICD-10-CM

## 2025-05-01 LAB — HCYS SERPL-SCNC: 12.5 UMOL/L (ref 0–17.2)

## 2025-05-01 PROCEDURE — 3079F DIAST BP 80-89 MM HG: CPT | Mod: CPTII,S$GLB,, | Performed by: OBSTETRICS & GYNECOLOGY

## 2025-05-01 PROCEDURE — 3008F BODY MASS INDEX DOCD: CPT | Mod: CPTII,S$GLB,, | Performed by: OBSTETRICS & GYNECOLOGY

## 2025-05-01 PROCEDURE — 99999 PR PBB SHADOW E&M-EST. PATIENT-LVL V: CPT | Mod: PBBFAC,,, | Performed by: OBSTETRICS & GYNECOLOGY

## 2025-05-01 PROCEDURE — 99205 OFFICE O/P NEW HI 60 MIN: CPT | Mod: S$GLB,,, | Performed by: OBSTETRICS & GYNECOLOGY

## 2025-05-01 PROCEDURE — 1125F AMNT PAIN NOTED PAIN PRSNT: CPT | Mod: CPTII,S$GLB,, | Performed by: OBSTETRICS & GYNECOLOGY

## 2025-05-01 PROCEDURE — 3075F SYST BP GE 130 - 139MM HG: CPT | Mod: CPTII,S$GLB,, | Performed by: OBSTETRICS & GYNECOLOGY

## 2025-05-01 PROCEDURE — 3288F FALL RISK ASSESSMENT DOCD: CPT | Mod: CPTII,S$GLB,, | Performed by: OBSTETRICS & GYNECOLOGY

## 2025-05-01 PROCEDURE — 1101F PT FALLS ASSESS-DOCD LE1/YR: CPT | Mod: CPTII,S$GLB,, | Performed by: OBSTETRICS & GYNECOLOGY

## 2025-05-01 RX ORDER — DULOXETIN HYDROCHLORIDE 60 MG/1
60 CAPSULE, DELAYED RELEASE ORAL
COMMUNITY
Start: 2025-04-29

## 2025-05-01 NOTE — PROGRESS NOTES
Subjective    Patient ID: Abbey Hagan is a 69 y.o. female     Chief complaint: NEW PATIENT - NP:Weeks, left adnexal cyst      HPI  69 y.o.  female referred by Dr. Digna Simpson for left adnexal. First noted on imaging obtained due to right labial abscess which has since resolved. Denies changes in bowel habits, changes in appetite, unintended weight loss, N/V, abdominal or pelvic pain, or vaginal bleeding.     She has a past medical history of Anxiety, Arthritis, Depression, Emphysema, HSV infection, Hypertension, and DVT.     No prior abdominal surgeries.     Reports menopause at age 39. Last pap >10 years. Denies previous abnormal pap.   Last screening colonoscopy in . Recommended repeat screening in 5 years.   Last mammogram 2024 BI-RADS Category 1: Negative     She denies family history of breast/gyn/colon cancer.    2024 CT AP:  There is a 7.0 x 2.0 cm abscess in the right labia.  The there is a 6.3 cm low-attenuation lesion seen within the left adnexa possibly representing cyst.  Nonemergent pelvic ultrasound for further evaluation is recommended.  There is thrombosis of the deep right pelvic vein.    10/8/2024 Pelvic US:  Limited visualization of the uterus was achieved, with the uterus not evaluated.  The right ovary is not seen.  A cyst is present in the left adnexa measuring 7.5 x 5.4 x 5.6 cm. This demonstrates a mildly thickened septation. No associated internal color Doppler blood flow. The left ovary is not identified separate from the cyst.    2025 CT AP:  The uterus is absent. There is a 7.0 x 3.4 cm cystic mass in the left adnexa which previously measured 6.3 cm. There is no pelvic adenopathy. Due to timing of contrast bolus the venous structures in the pelvis were not opacified.    2025 Pelvic US:  The uterus and right ovary not identified.  In the left adnexa, there is a 7.4 x 5.5 x 4.8 cm cyst exhibiting a 1.5 cm mural nodule and at least 1 thin septation. No  internal color Doppler blood flow.    2025 ERROL   = 30  HE4,S = 97  ERROL, postmenopausal = 3.01      Review of Systems   Constitutional:  Negative for activity change, appetite change, chills, fatigue, fever and unexpected weight change.   Respiratory:  Negative for cough and shortness of breath.    Cardiovascular:  Negative for chest pain and leg swelling.   Gastrointestinal:  Negative for abdominal distention, abdominal pain, constipation, diarrhea, nausea and vomiting.   Genitourinary:  Negative for difficulty urinating, flank pain, hematuria, vaginal bleeding, vaginal discharge and vaginal pain.   Musculoskeletal: Negative.  Negative for gait problem.   Skin: Negative.  Negative for rash.   Neurological: Negative.    Hematological:  Negative for adenopathy. Does not bruise/bleed easily.   Psychiatric/Behavioral: Negative.          Review of patient's allergies indicates:   Allergen Reactions    Penicillin g      Childhood allergy--unknown reaction    Sulfa (sulfonamide antibiotics)      Childhood allergy--unknown reaction        Current Medications[1]     Past Medical History[2]     Past Surgical History[3]     OB History    Para Term  AB Living   1 0   1    SAB IAB Ectopic Multiple Live Births             # Outcome Date GA Lbr Jabari/2nd Weight Sex Type Anes PTL Lv   1 AB              Social History[4]   Family History   Problem Relation Name Age of Onset    COPD Mother      Hypertension Mother      Heart disease Mother      Alcohol abuse Father      Depression Sister      Hypertension Sister      Colon cancer Maternal Grandmother      Breast cancer Paternal Aunt      Ovarian cancer Neg Hx      Psoriasis Neg Hx      Lupus Neg Hx      Eczema Neg Hx      Melanoma Neg Hx       Health Maintenance Topics with due status: Not Due       Topic Last Completion Date    TETANUS VACCINE 2016    Lipid Panel 2020    Colorectal Cancer Screening 10/26/2020    Influenza Vaccine 10/06/2023     "DEXA Scan 10/17/2023     Health Maintenance Due   Topic Date Due    Shingles Vaccine (1 of 2) Never done    RSV Vaccine (Age 60+ and Pregnant patients) (1 - Risk 60-74 years 1-dose series) Never done    COVID-19 Vaccine (4 - 2024-25 season) 09/01/2024    LDCT Lung Screen  10/17/2024    Mammogram  06/05/2025         Objective    /84   Pulse 100   Temp 97.4 °F (36.3 °C) (Temporal)   Resp 16   Ht 5' 9" (1.753 m)   Wt 67.9 kg (149 lb 11.1 oz)   LMP 12/06/1988   SpO2 (!) 94%   BMI 22.11 kg/m²     Physical Exam  Vitals reviewed.   Constitutional:       General: She is not in acute distress.     Appearance: Normal appearance. She is not ill-appearing.   HENT:      Head: Normocephalic and atraumatic.      Nose: No congestion.   Eyes:      Pupils: Pupils are equal, round, and reactive to light.   Cardiovascular:      Rate and Rhythm: Normal rate.      Pulses: Normal pulses.   Pulmonary:      Effort: Pulmonary effort is normal. No respiratory distress.   Abdominal:      General: There is no distension.      Palpations: Abdomen is soft.      Tenderness: There is no abdominal tenderness.   Musculoskeletal:      Right lower leg: No edema.      Left lower leg: No edema.   Skin:     General: Skin is warm and dry.   Neurological:      General: No focal deficit present.      Mental Status: She is alert and oriented to person, place, and time. Mental status is at baseline.   Psychiatric:         Mood and Affect: Mood normal.         Behavior: Behavior normal.              Assessment and Plan    1. Complex ovarian cyst  - Ambulatory referral/consult to Gynecologic Oncology  - CT Abdomen Pelvis With IV Contrast Routine Oral Contrast; Future  - Creatinine, serum; Future      Plan:     I had an extended conversation with the patient regarding pelvic masses and ovarian cysts.  We discussed the differential diagnosis including benign, borderline, or malignancy etiologies.  We discussed her personal and family history as well " as lab and imaging studies and how those factors impact risk of malignancy. I discussed with the patient that the primary treatment for an adnexal mass is observation versus surgical management.     She has a complex ovarian lesion and an elevated ERROL score. I have recommended surgical exploration and removal for definitive diagnosis and management.     She is hesitant to proceed with surgery at the present time and declines. She alternatively elects for short interval follow up imaging and will take some time to think about the things we discussed today.      I spent approximately 60 minutes reviewing the available records and evaluating the patient, out of which over 50% of the time was spent face to face with the patient in counseling and coordinating this patient's care.          [1]   Current Outpatient Medications   Medication Sig Dispense Refill    acetaminophen (TYLENOL 8 HOUR ORAL) Take by mouth daily as needed.      albuterol (VENTOLIN HFA) 90 mcg/actuation inhaler Inhale 2 puffs into the lungs every 4 (four) hours as needed for Wheezing or Shortness of Breath. Rescue 18 g 11    ascorbic acid, vitamin C, (VITAMIN C) 100 MG tablet Take 100 mg by mouth once daily.      CHOLECALCIFEROL, VITAMIN D3, (VITAMIN D-3 ORAL) No Sig Provided      dicyclomine (BENTYL) 20 mg tablet Take 20 mg by mouth 2 (two) times daily.      DULoxetine (CYMBALTA) 60 MG capsule Take 60 mg by mouth.      fluticasone-umeclidin-vilanter (TRELEGY ELLIPTA) 100-62.5-25 mcg DsDv Inhale 1 puff into the lungs once daily. 60 each 11    lisinopriL-hydrochlorothiazide (PRINZIDE,ZESTORETIC) 10-12.5 mg per tablet TAKE 1 TABLET BY MOUTH EVERY DAY 90 tablet 3    multivitamin capsule Take 1 capsule by mouth once daily.      POTASSIUM/MAGNESIUM (MAGNESIUM-POTASSIUM ORAL) Take 1 tablet by mouth once daily. Every day      traMADoL (ULTRAM) 50 mg tablet Take 50 mg by mouth every 8 (eight) hours as needed.      vitamin B complex (B COMPLEX 1 ORAL) Take by  mouth.      albuterol-ipratropium (DUO-NEB) 2.5 mg-0.5 mg/3 mL nebulizer solution Take 3 mLs by nebulization every 6 (six) hours as needed for Wheezing or Shortness of Breath. Rescue 1 Box 11    gabapentin (NEURONTIN) 300 MG capsule Take 2 capsules (600 mg total) by mouth every evening. 180 capsule 2     No current facility-administered medications for this visit.   [2]   Past Medical History:  Diagnosis Date    Anxiety     Arthritis     back, neck and knees    Depression     Emphysema of lung     HSV infection     Hypertension     S/P total hip arthroplasty 01/12/2016   [3]   Past Surgical History:  Procedure Laterality Date    COLONOSCOPY N/A 10/26/2020    Procedure: COLONOSCOPY;  Surgeon: Gerard Hernandez MD;  Location: [facility];  Service: Endoscopy;  Laterality: N/A;    DILATION AND CURETTAGE OF UTERUS      HIP ARTHROPLASTY      bilat    JOINT REPLACEMENT      bilat hip    TRANSFORAMINAL EPIDURAL INJECTION OF STEROID Bilateral 04/06/2021    Procedure: Injection,steroid,epidural,transforaminal approach;  Surgeon: Scott Barger MD;  Location: [facility] OR;  Service: Pain Management;  Laterality: Bilateral;  L4-5   [4]   Social History  Tobacco Use    Smoking status: Former     Current packs/day: 0.00     Average packs/day: 0.5 packs/day for 40.0 years (20.0 ttl pk-yrs)     Types: Cigarettes     Start date: 9/28/1981     Quit date: 9/28/2021     Years since quitting: 3.6    Smokeless tobacco: Never    Tobacco comments:     Patient down to 8 cigs a day   Substance Use Topics    Alcohol use: Yes     Comment: socially    Drug use: Yes     Types: Oxycodone, Marijuana

## 2025-05-03 LAB
AT III ACT/NOR PPP CHRO: 114 % (ref 75–135)
PROT C ACT/NOR PPP CHRO: 143 % (ref 73–180)
PROT C AG ACT/NOR PPP IA: 137 % (ref 60–150)
PROT S ACT/NOR PPP: 100 % (ref 63–140)
PROT S AG ACT/NOR PPP IA: 83 % (ref 60–150)
PROT S FREE AG ACT/NOR PPP IA: 107 % (ref 61–136)

## 2025-05-05 ENCOUNTER — OFFICE VISIT (OUTPATIENT)
Dept: HEMATOLOGY/ONCOLOGY | Facility: CLINIC | Age: 70
End: 2025-05-05
Payer: MEDICARE

## 2025-05-05 VITALS
HEIGHT: 69 IN | SYSTOLIC BLOOD PRESSURE: 122 MMHG | BODY MASS INDEX: 22.43 KG/M2 | RESPIRATION RATE: 18 BRPM | HEART RATE: 76 BPM | OXYGEN SATURATION: 95 % | WEIGHT: 151.44 LBS | TEMPERATURE: 98 F | DIASTOLIC BLOOD PRESSURE: 70 MMHG

## 2025-05-05 DIAGNOSIS — R19.00 PELVIC MASS: ICD-10-CM

## 2025-05-05 DIAGNOSIS — M54.16 LUMBAR RADICULITIS: Primary | ICD-10-CM

## 2025-05-05 DIAGNOSIS — I82.890 ACUTE DEEP VEIN THROMBOSIS (DVT) OF PELVIC VEIN: Primary | ICD-10-CM

## 2025-05-05 DIAGNOSIS — I82.890 ACUTE DEEP VEIN THROMBOSIS (DVT) OF PELVIC VEIN: ICD-10-CM

## 2025-05-05 DIAGNOSIS — I10 ESSENTIAL HYPERTENSION: ICD-10-CM

## 2025-05-05 LAB
F5 P.R506Q BLD/T QL: NORMAL
IMP & REVIEW OF LAB RESULTS: NORMAL

## 2025-05-05 PROCEDURE — 3008F BODY MASS INDEX DOCD: CPT | Mod: CPTII,S$GLB,, | Performed by: INTERNAL MEDICINE

## 2025-05-05 PROCEDURE — 3074F SYST BP LT 130 MM HG: CPT | Mod: CPTII,S$GLB,, | Performed by: INTERNAL MEDICINE

## 2025-05-05 PROCEDURE — 1125F AMNT PAIN NOTED PAIN PRSNT: CPT | Mod: CPTII,S$GLB,, | Performed by: INTERNAL MEDICINE

## 2025-05-05 PROCEDURE — 3078F DIAST BP <80 MM HG: CPT | Mod: CPTII,S$GLB,, | Performed by: INTERNAL MEDICINE

## 2025-05-05 PROCEDURE — 3288F FALL RISK ASSESSMENT DOCD: CPT | Mod: CPTII,S$GLB,, | Performed by: INTERNAL MEDICINE

## 2025-05-05 PROCEDURE — 1101F PT FALLS ASSESS-DOCD LE1/YR: CPT | Mod: CPTII,S$GLB,, | Performed by: INTERNAL MEDICINE

## 2025-05-05 PROCEDURE — 99999 PR PBB SHADOW E&M-EST. PATIENT-LVL IV: CPT | Mod: PBBFAC,,, | Performed by: INTERNAL MEDICINE

## 2025-05-05 PROCEDURE — 99214 OFFICE O/P EST MOD 30 MIN: CPT | Mod: S$GLB,,, | Performed by: INTERNAL MEDICINE

## 2025-05-05 PROCEDURE — 1159F MED LIST DOCD IN RCRD: CPT | Mod: CPTII,S$GLB,, | Performed by: INTERNAL MEDICINE

## 2025-05-05 NOTE — Clinical Note
3 labs are not resulted. One methyltetrahydrofolate says active ( was it even drawn?) we will have to call pts once all this is resulted and  only then decide what to do

## 2025-05-05 NOTE — PROGRESS NOTES
Subjective:       Patient ID: Abbey Hagan is a 69 y.o. female.    Chief Complaint:  f/u of thrombosis    HPI  69-year-old female found to have pelvic deep vein thrombosis started on Lovenox changed to rivaroxaban discharged home and sent to Hematology Clinic   No dvt prev. No family hx  No one else in family on blood thinners  No misccariages    Review of Systems   Constitutional:  Positive for appetite change. Negative for unexpected weight change.   HENT:  Negative for mouth sores.    Eyes:  Negative for visual disturbance.   Respiratory:  Positive for shortness of breath. Negative for cough.    Cardiovascular:  Negative for chest pain.   Gastrointestinal:  Positive for abdominal pain. Negative for diarrhea.   Genitourinary:  Negative for frequency.   Musculoskeletal:  Positive for back pain.   Skin:  Negative for rash.   Neurological:  Negative for headaches.   Hematological:  Negative for adenopathy.   Psychiatric/Behavioral:  The patient is nervous/anxious.              Past Medical History:   Diagnosis Date    Anxiety     Arthritis     back, neck and knees    Depression     Emphysema of lung     HSV infection     Hypertension     S/P total hip arthroplasty 01/12/2016     Past Surgical History:   Procedure Laterality Date    COLONOSCOPY N/A 10/26/2020    Procedure: COLONOSCOPY;  Surgeon: Gerard Hernandez MD;  Location: [facility];  Service: Endoscopy;  Laterality: N/A;    DILATION AND CURETTAGE OF UTERUS      HIP ARTHROPLASTY      bilat    JOINT REPLACEMENT      bilat hip    TRANSFORAMINAL EPIDURAL INJECTION OF STEROID Bilateral 04/06/2021    Procedure: Injection,steroid,epidural,transforaminal approach;  Surgeon: Scott Barger MD;  Location: [facility] OR;  Service: Pain Management;  Laterality: Bilateral;  L4-5     Family History   Problem Relation Name Age of Onset    COPD Mother      Hypertension Mother      Heart disease Mother      Alcohol abuse Father      Depression Sister      Hypertension Sister       Colon cancer Maternal Grandmother      Breast cancer Paternal Aunt      Ovarian cancer Neg Hx      Psoriasis Neg Hx      Lupus Neg Hx      Eczema Neg Hx      Melanoma Neg Hx        Social History     Socioeconomic History    Marital status: Single   Occupational History     Employer: [employer]   Tobacco Use    Smoking status: Former     Current packs/day: 0.00     Average packs/day: 0.5 packs/day for 40.0 years (20.0 ttl pk-yrs)     Types: Cigarettes     Start date: 9/28/1981     Quit date: 9/28/2021     Years since quitting: 3.6    Smokeless tobacco: Never    Tobacco comments:     Patient down to 8 cigs a day   Substance and Sexual Activity    Alcohol use: Yes     Comment: socially    Drug use: Yes     Types: Oxycodone, Marijuana    Sexual activity: Not Currently     Social Drivers of Health     Financial Resource Strain: Low Risk  (2/15/2025)    Overall Financial Resource Strain (CARDIA)     Difficulty of Paying Living Expenses: Not hard at all   Food Insecurity: No Food Insecurity (2/15/2025)    Hunger Vital Sign     Worried About Running Out of Food in the Last Year: Never true     Ran Out of Food in the Last Year: Never true   Transportation Needs: No Transportation Needs (2/15/2025)    PRAPARE - Transportation     Lack of Transportation (Medical): No     Lack of Transportation (Non-Medical): No   Physical Activity: Inactive (2/15/2025)    Exercise Vital Sign     Days of Exercise per Week: 0 days     Minutes of Exercise per Session: 0 min   Stress: No Stress Concern Present (2/15/2025)    [employer] [place] [employer] - Occupational Stress Questionnaire     Feeling of Stress : Only a little   Housing Stability: Low Risk  (2/15/2025)    Housing Stability Vital Sign     Unable to Pay for Housing in the Last Year: No     Number of Times Moved in the Last Year: 0     Homeless in the Last Year: No     Review of patient's allergies indicates:   Allergen Reactions    Penicillin g      Childhood  allergy--unknown reaction    Sulfa (sulfonamide antibiotics)      Childhood allergy--unknown reaction       Current Outpatient Medications:     acetaminophen (TYLENOL 8 HOUR ORAL), Take by mouth daily as needed., Disp: , Rfl:     albuterol (VENTOLIN HFA) 90 mcg/actuation inhaler, Inhale 2 puffs into the lungs every 4 (four) hours as needed for Wheezing or Shortness of Breath. Rescue, Disp: 18 g, Rfl: 11    ascorbic acid, vitamin C, (VITAMIN C) 100 MG tablet, Take 100 mg by mouth once daily., Disp: , Rfl:     CHOLECALCIFEROL, VITAMIN D3, (VITAMIN D-3 ORAL), No Sig Provided, Disp: , Rfl:     dicyclomine (BENTYL) 20 mg tablet, Take 20 mg by mouth 2 (two) times daily., Disp: , Rfl:     DULoxetine (CYMBALTA) 60 MG capsule, Take 60 mg by mouth., Disp: , Rfl:     fluticasone-umeclidin-vilanter (TRELEGY ELLIPTA) 100-62.5-25 mcg DsDv, Inhale 1 puff into the lungs once daily., Disp: 60 each, Rfl: 11    gabapentin (NEURONTIN) 300 MG capsule, Take 2 capsules (600 mg total) by mouth every evening., Disp: 180 capsule, Rfl: 2    lisinopriL-hydrochlorothiazide (PRINZIDE,ZESTORETIC) 10-12.5 mg per tablet, TAKE 1 TABLET BY MOUTH EVERY DAY, Disp: 90 tablet, Rfl: 3    multivitamin capsule, Take 1 capsule by mouth once daily., Disp: , Rfl:     POTASSIUM/MAGNESIUM (MAGNESIUM-POTASSIUM ORAL), Take 1 tablet by mouth once daily. Every day, Disp: , Rfl:     traMADoL (ULTRAM) 50 mg tablet, Take 50 mg by mouth every 8 (eight) hours as needed., Disp: , Rfl:     vitamin B complex (B COMPLEX 1 ORAL), Take by mouth., Disp: , Rfl:     albuterol-ipratropium (DUO-NEB) 2.5 mg-0.5 mg/3 mL nebulizer solution, Take 3 mLs by nebulization every 6 (six) hours as needed for Wheezing or Shortness of Breath. Rescue, Disp: 1 Box, Rfl: 11    Physical Exam    Wt Readings from Last 3 Encounters:   05/05/25 68.7 kg (151 lb 7.3 oz)   05/01/25 67.9 kg (149 lb 11.1 oz)   04/16/25 69 kg (152 lb 1.9 oz)     Temp Readings from Last 3 Encounters:   05/05/25 97.7 °F (36.5  °C) (Temporal)   05/01/25 97.4 °F (36.3 °C) (Temporal)   02/17/25 97.7 °F (36.5 °C) (Temporal)     BP Readings from Last 3 Encounters:   05/05/25 122/70   05/01/25 132/84   04/16/25 (!) 140/80     Pulse Readings from Last 3 Encounters:   05/05/25 76   05/01/25 100   04/16/25 88    VITAL SIGNS:  as above   GENERAL: appears well-built, well-nourished.  No anxiety, no agitation, and in no distress.  Patient is awake, alert, oriented and cooperative.  HEENT:  Showed no congestion. Trachea is central no obvious icterus or pallor noted no hoarseness. no obvious JVD   NECK:  Supple.  No JVD. No obvious cervical submental or supraclavicular adenopathy.  RS:the visualized portion of  Chest expands well. chest appears symmetric, no audible wheezes.  No dyspnea recognized  ABDOMEN:  abdomen appears undistended.  EXTREMITIES:  Without edema.  NEUROLOGICAL:  The patient is appropriate, higher functions are normal.  No  obvious neurological deficits.  normal judgement normal thought content  No confusion, no speech impediment. Cranial nerves are intact and show no deficit. No gross motor deficits noted   SKIN MUSCULOSKELETAL: no joint or skeletal deformity, no clubbing of nails.  No visible rash ecchymosis or petechiae     Lab Results   Component Value Date    WBC 10.31 02/12/2025    HGB 14.6 02/12/2025    HCT 46.2 02/12/2025    MCV 89 02/12/2025     02/12/2025       BMP  Lab Results   Component Value Date     (L) 02/12/2025    K 3.7 02/12/2025    CL 93 (L) 02/12/2025    CO2 35 (H) 02/12/2025    BUN 8 02/12/2025    CREATININE 0.8 02/12/2025    CALCIUM 9.6 02/12/2025    ANIONGAP 7 (L) 02/12/2025    ESTGFRAFRICA >60.0 03/30/2022    EGFRNONAA >60.0 03/30/2022       Impression:     There is a 7.0 x 2.0 cm abscess in the right labia.     The there is a 6.3 cm low-attenuation lesion seen within the left adnexa possibly representing cyst.  Nonemergent pelvic ultrasound for further evaluation is recommended.     There is  thrombosis of the deep right pelvic vein.     This report was flagged in Epic as abnormal.    Impression:     7.0 x 3.4 cm cystic mass in the left adnexa which has slightly enlarged when compared to the prior study     Prior hysterectomy     Colonic diverticulosis     No evidence of cecal mass     Simple hepatic cysts and right renal cysts     Degenerative changes of the spine  Patient Active Problem List   Diagnosis    Essential hypertension    COPD with asthma    Primary osteoarthritis involving multiple joints    Anxiety and depression    Tobacco abuse    History of colon polyps    Tinnitus, bilateral    Decreased hearing of both ears    Colitis    Lumbar radiculitis    Labial abscess    Acute deep vein thrombosis (DVT) of pelvic vein        Assessment and Plan     Deep right pelvic pain thrombosis patient on rivaroxaban  Advised to continue the same for at least 3-6 months restudy  Discussed  bridging with pt repeat study does not reveal the same thrombosis   Hypercoagulability testing partially has resulted and so far negative will call her when rest of labs are available to discuss    Has pelvic mass on CT scan referred to gyn Dr weeks surgery has been recommended but patient has been postponing this lengthy discussion today counseling her    Patient has chronic back pain.  And neck pain has follow-up with primary care has seen orthopedic neurology  Hypertension patient is on lisinopril good control continue medication and diet  MDM includes  :    - Acute or chronic illness or injury that poses a threat to life or bodily function  - Independent review and explanation of 3+ results from unique tests  - Discussion of management and ordering 3+ unique tests  - Extensive discussion of treatment and management  - Prescription drug management  - Drug therapy requiring intensive monitoring for toxicity

## 2025-05-06 ENCOUNTER — TELEPHONE (OUTPATIENT)
Dept: HEMATOLOGY/ONCOLOGY | Facility: CLINIC | Age: 70
End: 2025-05-06
Payer: MEDICARE

## 2025-05-06 ENCOUNTER — LAB VISIT (OUTPATIENT)
Dept: LAB | Facility: HOSPITAL | Age: 70
End: 2025-05-06
Attending: INTERNAL MEDICINE
Payer: MEDICARE

## 2025-05-06 DIAGNOSIS — N83.299 COMPLEX OVARIAN CYST: ICD-10-CM

## 2025-05-06 LAB
CREAT SERPL-MCNC: 0.7 MG/DL (ref 0.5–1.4)
GFR SERPLBLD CREATININE-BSD FMLA CKD-EPI: >60 ML/MIN/1.73/M2

## 2025-05-06 PROCEDURE — 36415 COLL VENOUS BLD VENIPUNCTURE: CPT

## 2025-05-06 PROCEDURE — 82565 ASSAY OF CREATININE: CPT

## 2025-05-06 NOTE — TELEPHONE ENCOUNTER
Spoke with pt about labs. She states that they monica them. Labs pending.     ----- Message from Constantino sent at 5/6/2025 11:46 AM CDT -----  Contact: Solange [phone]  Type:  Needs Medical AdviceWho Called: Solange mendoza/ [employer]Would the patient rather a call back or a response via [facility]? CallBest Call Back Number: [phone] Additional Information: Lab adv labs can not be done stat. Thank you.

## 2025-05-07 LAB — LABCORP MISCELLANEOUS TEST RESULT: NORMAL

## 2025-05-10 LAB
B2 GLYCOPROT1 IGA SER-ACNC: <2 U/ML
B2 GLYCOPROT1 IGG SER-ACNC: <2 U/ML
B2 GLYCOPROT1 IGM SER-ACNC: 2.7 U/ML
CARDIOLIPIN IGA SER IA-ACNC: 2.4 APL-U/ML
CARDIOLIPIN IGG SER IA-ACNC: <2 GPL-U/ML
CARDIOLIPIN IGM SER IA-ACNC: 2.6 MPL-U/ML
MTHFR GENE MUT ANL BLD/T: NORMAL
MTHFR GENE MUT ANL BLD/T: NORMAL
PS-PROTHROM CMPLX IGG SERPL IA-ACNC: <9 U
PS-PROTHROM CMPLX IGM SERPL IA-ACNC: <9 U
SERVICE CMNT-IMP: NORMAL

## 2025-05-15 ENCOUNTER — TELEPHONE (OUTPATIENT)
Dept: GYNECOLOGIC ONCOLOGY | Facility: CLINIC | Age: 70
End: 2025-05-15
Payer: MEDICARE

## 2025-05-15 NOTE — TELEPHONE ENCOUNTER
Verified 2 patient identifies. Spoke w/ pt who said that she decided to go ahead with surgery and was asking if she still needs the CT Scam scheduled for 5/20/2025, or if she can cancel that. RN said she would forward this message to Dr. Tejada to clarify, and would get back to her with an answer, pt DORY.

## 2025-05-19 ENCOUNTER — PATIENT MESSAGE (OUTPATIENT)
Dept: HEMATOLOGY/ONCOLOGY | Facility: CLINIC | Age: 70
End: 2025-05-19
Payer: MEDICARE

## 2025-05-22 ENCOUNTER — OFFICE VISIT (OUTPATIENT)
Dept: GYNECOLOGIC ONCOLOGY | Facility: CLINIC | Age: 70
End: 2025-05-22
Payer: MEDICARE

## 2025-05-22 VITALS
RESPIRATION RATE: 17 BRPM | BODY MASS INDEX: 22.47 KG/M2 | TEMPERATURE: 98 F | WEIGHT: 151.69 LBS | OXYGEN SATURATION: 98 % | HEIGHT: 69 IN

## 2025-05-22 DIAGNOSIS — I82.890 ACUTE DEEP VEIN THROMBOSIS (DVT) OF PELVIC VEIN: ICD-10-CM

## 2025-05-22 DIAGNOSIS — N83.299 COMPLEX OVARIAN CYST: Primary | ICD-10-CM

## 2025-05-22 DIAGNOSIS — Z01.818 PRE-OP TESTING: ICD-10-CM

## 2025-05-22 PROCEDURE — 99999 PR PBB SHADOW E&M-EST. PATIENT-LVL IV: CPT | Mod: PBBFAC,,, | Performed by: OBSTETRICS & GYNECOLOGY

## 2025-05-22 NOTE — LETTER
May 23, 2025        Digna Simpson MD  [address] [facility] [address]  [facility] [address]             [facility] GYN On[facility]  [address] [place] [address]  Phone: [phone]   Patient: Abbey Hagan   MR Number: [id number]   YOB: 1955   Date of Visit: 5/22/2025       Dear Dr. Simpson:    Thank you for referring Abbey Hagan to me for evaluation. Attached you will find relevant portions of my assessment and plan of care.    If you have questions, please do not hesitate to call me. I look forward to following Abbey Hagan along with you.    Sincerely,      Vanessa Tejada MD            CC  No Recipients    Enclosure

## 2025-05-22 NOTE — PROGRESS NOTES
Subjective    Patient ID: Abbey Hagan is a 69 y.o. female     Chief complaint: Follow-up (Complex ovarian cyst)      HPI  Visit 2025:  I had an extended conversation with the patient regarding pelvic masses and ovarian cysts.  We discussed the differential diagnosis including benign, borderline, or malignancy etiologies.  We discussed her personal and family history as well as lab and imaging studies and how those factors impact risk of malignancy. I discussed with the patient that the primary treatment for an adnexal mass is observation versus surgical management.     She has a complex ovarian lesion and an elevated ERROL score. I have recommended surgical exploration and removal for definitive diagnosis and management.     She is hesitant to proceed with surgery at the present time and declines.     Today's visit:  She presents today because she has elected to move forward with surgery.     _________________________________  69 y.o.  female referred for left adnexal. First noted on imaging obtained due to right labial abscess which has since resolved. Denies changes in bowel habits, changes in appetite, unintended weight loss, N/V, abdominal or pelvic pain, or vaginal bleeding.     She has a past medical history of Anxiety, Arthritis, Depression, Emphysema, HSV infection, Hypertension, and DVT.     No prior abdominal surgeries.     Reports menopause at age 39. Last pap >10 years. Denies previous abnormal pap.   Last screening colonoscopy in . Recommended repeat screening in 5 years.   Last mammogram 2024 BI-RADS Category 1: Negative     She denies family history of breast/gyn/colon cancer.    2024 CT AP:  There is a 7.0 x 2.0 cm abscess in the right labia.  The there is a 6.3 cm low-attenuation lesion seen within the left adnexa possibly representing cyst.  Nonemergent pelvic ultrasound for further evaluation is recommended.  There is thrombosis of the deep right pelvic  vein.    10/8/2024 Pelvic US:  Limited visualization of the uterus was achieved, with the uterus not evaluated.  The right ovary is not seen.  A cyst is present in the left adnexa measuring 7.5 x 5.4 x 5.6 cm. This demonstrates a mildly thickened septation. No associated internal color Doppler blood flow. The left ovary is not identified separate from the cyst.    2/13/2025 CT AP:  The uterus is absent. There is a 7.0 x 3.4 cm cystic mass in the left adnexa which previously measured 6.3 cm. There is no pelvic adenopathy. Due to timing of contrast bolus the venous structures in the pelvis were not opacified.    2/19/2025 Pelvic US:  The uterus and right ovary not identified.  In the left adnexa, there is a 7.4 x 5.5 x 4.8 cm cyst exhibiting a 1.5 cm mural nodule and at least 1 thin septation. No internal color Doppler blood flow.    4/17/2025 ERROL   = 30  HE4,S = 97  ERROL, postmenopausal = 3.01      Review of Systems   Constitutional:  Negative for activity change, appetite change, chills, fatigue, fever and unexpected weight change.   Respiratory:  Negative for cough and shortness of breath.    Cardiovascular:  Negative for chest pain and leg swelling.   Gastrointestinal:  Negative for abdominal distention, abdominal pain, constipation, diarrhea, nausea and vomiting.   Genitourinary:  Negative for difficulty urinating, flank pain, hematuria, vaginal bleeding, vaginal discharge and vaginal pain.   Musculoskeletal: Negative.  Negative for gait problem.   Skin: Negative.  Negative for rash.   Neurological: Negative.    Hematological:  Negative for adenopathy. Does not bruise/bleed easily.   Psychiatric/Behavioral: Negative.          Review of patient's allergies indicates:   Allergen Reactions    Penicillin g      Childhood allergy--unknown reaction    Sulfa (sulfonamide antibiotics)      Childhood allergy--unknown reaction        Current Medications[1]     Past Medical History[2]     Past Surgical History[3]  "    OB History    Para Term  AB Living   1 0   1    SAB IAB Ectopic Multiple Live Births             # Outcome Date GA Lbr Jabari/2nd Weight Sex Type Anes PTL Lv   1 AB              Social History[4]   Family History   Problem Relation Name Age of Onset    COPD Mother      Hypertension Mother      Heart disease Mother      Alcohol abuse Father      Depression Sister      Hypertension Sister      Colon cancer Maternal Grandmother      Breast cancer Paternal Aunt      Ovarian cancer Neg Hx      Psoriasis Neg Hx      Lupus Neg Hx      Eczema Neg Hx      Melanoma Neg Hx       Health Maintenance Topics with due status: Not Due       Topic Last Completion Date    TETANUS VACCINE 2016    Lipid Panel 2020    Colorectal Cancer Screening 10/26/2020    Influenza Vaccine 10/06/2023    DEXA Scan 10/17/2023     Health Maintenance Due   Topic Date Due    Shingles Vaccine (1 of 2) Never done    RSV Vaccine (Age 60+ and Pregnant patients) (1 - Risk 60-74 years 1-dose series) Never done    COVID-19 Vaccine ( season) 2024    LDCT Lung Screen  10/17/2024    Mammogram  2025         Objective    Temp 98 °F (36.7 °C) (Temporal)   Resp 17   Ht 5' 9" (1.753 m)   Wt 68.8 kg (151 lb 10.8 oz)   LMP 1988   SpO2 98%   BMI 22.40 kg/m²     Physical Exam  Vitals reviewed.   Constitutional:       General: She is not in acute distress.     Appearance: Normal appearance. She is not ill-appearing.   HENT:      Head: Normocephalic and atraumatic.      Nose: No congestion.   Eyes:      Pupils: Pupils are equal, round, and reactive to light.   Cardiovascular:      Rate and Rhythm: Normal rate.      Pulses: Normal pulses.   Pulmonary:      Effort: Pulmonary effort is normal. No respiratory distress.   Abdominal:      General: There is no distension.      Palpations: Abdomen is soft.      Tenderness: There is no abdominal tenderness.   Musculoskeletal:      Right lower leg: No edema.      Left lower " leg: No edema.   Skin:     General: Skin is warm and dry.   Neurological:      General: No focal deficit present.      Mental Status: She is alert and oriented to person, place, and time. Mental status is at baseline.   Psychiatric:         Mood and Affect: Mood normal.         Behavior: Behavior normal.              Assessment and Plan    1. Complex ovarian cyst  - CBC Auto Differential; Future  - Comprehensive Metabolic Panel; Future  - IN OFFICE EKG 12-LEAD (to Muse); Future  - Case Request Operating Room: XI ROBOTIC HYSTERECTOMY, XI ROBOTIC SALPINGO-OOPHORECTOMY, LAPAROSCOPY, STAGING    2. Pre-op testing  - CBC Auto Differential; Future  - Comprehensive Metabolic Panel; Future  - IN OFFICE EKG 12-LEAD (to Muse); Future    3. Acute deep vein thrombosis (DVT) of pelvic vein  - review with heme onc any indications/recommendations for periop anticoagulation beyond standard heparin day of procedure      Plan:  Previously counseled as above and has now elected to move forward with surgery.     Plan for RTLH/BSO/possible staging/any other indicated procedures based on intraoperative findings.     The risks, benefits, and indications of the procedure were discussed with the patient and her family members if present.  These included bleeding, transfusion, infection, damage to surrounding tissues (bowel, bladder, ureter), wound separation, lymphedema, conversion to laparotomy if laparoscopic, perioperative cardiac events, VTE, pneumonia, and possible death. She voiced understanding, all questions were answered and consents were signed.    I spent approximately 30 minutes reviewing the available records and evaluating the patient, out of which over 50% of the time was spent face to face with the patient in counseling and coordinating this patient's care.               [1]   Current Outpatient Medications   Medication Sig Dispense Refill    acetaminophen (TYLENOL 8 HOUR ORAL) Take by mouth daily as needed.      albuterol  (VENTOLIN HFA) 90 mcg/actuation inhaler Inhale 2 puffs into the lungs every 4 (four) hours as needed for Wheezing or Shortness of Breath. Rescue 18 g 11    ascorbic acid, vitamin C, (VITAMIN C) 100 MG tablet Take 100 mg by mouth once daily.      CHOLECALCIFEROL, VITAMIN D3, (VITAMIN D-3 ORAL) No Sig Provided      dicyclomine (BENTYL) 20 mg tablet Take 20 mg by mouth 2 (two) times daily.      DULoxetine (CYMBALTA) 60 MG capsule Take 60 mg by mouth.      fluticasone-umeclidin-vilanter (TRELEGY ELLIPTA) 100-62.5-25 mcg DsDv Inhale 1 puff into the lungs once daily. 60 each 11    lisinopriL-hydrochlorothiazide (PRINZIDE,ZESTORETIC) 10-12.5 mg per tablet TAKE 1 TABLET BY MOUTH EVERY DAY 90 tablet 3    multivitamin capsule Take 1 capsule by mouth once daily.      POTASSIUM/MAGNESIUM (MAGNESIUM-POTASSIUM ORAL) Take 1 tablet by mouth once daily. Every day      traMADoL (ULTRAM) 50 mg tablet Take 50 mg by mouth every 8 (eight) hours as needed.      vitamin B complex (B COMPLEX 1 ORAL) Take by mouth.      albuterol-ipratropium (DUO-NEB) 2.5 mg-0.5 mg/3 mL nebulizer solution Take 3 mLs by nebulization every 6 (six) hours as needed for Wheezing or Shortness of Breath. Rescue 1 Box 11     No current facility-administered medications for this visit.   [2]   Past Medical History:  Diagnosis Date    Anxiety     Arthritis     back, neck and knees    Depression     Emphysema of lung     HSV infection     Hypertension     S/P total hip arthroplasty 01/12/2016   [3]   Past Surgical History:  Procedure Laterality Date    COLONOSCOPY N/A 10/26/2020    Procedure: COLONOSCOPY;  Surgeon: Gerard Hernandez MD;  Location: [facility];  Service: Endoscopy;  Laterality: N/A;    DILATION AND CURETTAGE OF UTERUS      HIP ARTHROPLASTY      bilat    JOINT REPLACEMENT      bilat hip    TRANSFORAMINAL EPIDURAL INJECTION OF STEROID Bilateral 04/06/2021    Procedure: Injection,steroid,epidural,transforaminal approach;  Surgeon: Scott Barger MD;  Location:  [facility] OR;  Service: Pain Management;  Laterality: Bilateral;  L4-5   [4]   Social History  Tobacco Use    Smoking status: Former     Current packs/day: 0.00     Average packs/day: 0.5 packs/day for 40.0 years (20.0 ttl pk-yrs)     Types: Cigarettes     Start date: 9/28/1981     Quit date: 9/28/2021     Years since quitting: 3.6    Smokeless tobacco: Never    Tobacco comments:     Patient down to 8 cigs a day   Substance Use Topics    Alcohol use: Yes     Comment: socially    Drug use: Yes     Types: Oxycodone, Marijuana

## 2025-05-23 RX ORDER — CHLORHEXIDINE GLUCONATE ORAL RINSE 1.2 MG/ML
15 SOLUTION DENTAL 2 TIMES DAILY
OUTPATIENT
Start: 2025-05-23 | End: 2025-05-28

## 2025-05-23 RX ORDER — FAMOTIDINE 20 MG/1
20 TABLET, FILM COATED ORAL
OUTPATIENT
Start: 2025-05-23

## 2025-05-23 RX ORDER — CLINDAMYCIN PHOSPHATE 900 MG/50ML
900 INJECTION, SOLUTION INTRAVENOUS
OUTPATIENT
Start: 2025-05-23

## 2025-05-23 RX ORDER — MUPIROCIN 20 MG/G
OINTMENT TOPICAL
OUTPATIENT
Start: 2025-05-23

## 2025-05-23 RX ORDER — HEPARIN SODIUM 5000 [USP'U]/ML
5000 INJECTION, SOLUTION INTRAVENOUS; SUBCUTANEOUS
OUTPATIENT
Start: 2025-05-23 | End: 2025-05-24

## 2025-05-23 RX ORDER — SODIUM CHLORIDE 9 MG/ML
INJECTION, SOLUTION INTRAVENOUS CONTINUOUS
OUTPATIENT
Start: 2025-05-23

## 2025-05-23 RX ORDER — MUPIROCIN 20 MG/G
OINTMENT TOPICAL 2 TIMES DAILY
OUTPATIENT
Start: 2025-05-23 | End: 2025-05-28

## 2025-06-23 ENCOUNTER — HOSPITAL ENCOUNTER (OUTPATIENT)
Dept: RADIOLOGY | Facility: HOSPITAL | Age: 70
Discharge: HOME | End: 2025-06-23
Payer: MEDICARE

## 2025-06-23 VITALS — HEIGHT: 69 IN | WEIGHT: 151 LBS | BODY MASS INDEX: 22.36 KG/M2

## 2025-06-23 DIAGNOSIS — Z12.31 BREAST CANCER SCREENING BY MAMMOGRAM: ICD-10-CM

## 2025-06-23 PROCEDURE — 77063 BREAST TOMOSYNTHESIS BI: CPT | Mod: TC,PO

## 2025-06-23 PROCEDURE — 77063 BREAST TOMOSYNTHESIS BI: CPT | Mod: 26,,, | Performed by: RADIOLOGY

## 2025-06-23 PROCEDURE — 77067 SCR MAMMO BI INCL CAD: CPT | Mod: 26,,, | Performed by: RADIOLOGY

## 2025-07-21 ENCOUNTER — LAB VISIT (OUTPATIENT)
Dept: LAB | Facility: HOSPITAL | Age: 70
End: 2025-07-21
Attending: INTERNAL MEDICINE
Payer: MEDICARE

## 2025-07-21 DIAGNOSIS — N83.299 COMPLEX OVARIAN CYST: ICD-10-CM

## 2025-07-21 LAB
ABO + RH BLD: NORMAL
ANION GAP (OHS): 11 MMOL/L (ref 8–16)
BLD GP AB SCN CELLS X3 SERPL QL: NORMAL
BUN SERPL-MCNC: 8 MG/DL (ref 8–23)
CALCIUM SERPL-MCNC: 9.2 MG/DL (ref 8.7–10.5)
CHLORIDE SERPL-SCNC: 94 MMOL/L (ref 95–110)
CO2 SERPL-SCNC: 31 MMOL/L (ref 23–29)
CREAT SERPL-MCNC: 0.8 MG/DL (ref 0.5–1.4)
GFR SERPLBLD CREATININE-BSD FMLA CKD-EPI: >60 ML/MIN/1.73/M2
GLUCOSE SERPL-MCNC: 144 MG/DL (ref 70–110)
HGB BLD-MCNC: 14.9 GM/DL (ref 12–16)
POTASSIUM SERPL-SCNC: 4.3 MMOL/L (ref 3.5–5.1)
SODIUM SERPL-SCNC: 136 MMOL/L (ref 136–145)
SPECIMEN OUTDATE: NORMAL

## 2025-07-21 PROCEDURE — 86900 BLOOD TYPING SEROLOGIC ABO: CPT | Mod: PN | Performed by: OBSTETRICS & GYNECOLOGY

## 2025-07-21 PROCEDURE — 36415 COLL VENOUS BLD VENIPUNCTURE: CPT | Mod: PN

## 2025-07-21 PROCEDURE — 82374 ASSAY BLOOD CARBON DIOXIDE: CPT | Mod: PN

## 2025-07-21 PROCEDURE — 85018 HEMOGLOBIN: CPT | Mod: GA,PN

## 2025-07-21 PROCEDURE — 86901 BLOOD TYPING SEROLOGIC RH(D): CPT | Performed by: OBSTETRICS & GYNECOLOGY

## 2025-07-21 PROCEDURE — 36415 COLL VENOUS BLD VENIPUNCTURE: CPT | Mod: PN | Performed by: OBSTETRICS & GYNECOLOGY

## 2025-07-24 DIAGNOSIS — N83.299 COMPLEX OVARIAN CYST: Primary | ICD-10-CM

## 2025-07-24 PROBLEM — F17.200 TOBACCO DEPENDENCY: Status: ACTIVE | Noted: 2025-07-24

## 2025-07-24 PROBLEM — R20.0 ANESTHESIA: Status: ACTIVE | Noted: 2025-07-24

## 2025-07-24 RX ORDER — IBUPROFEN 600 MG/1
600 TABLET, FILM COATED ORAL EVERY 8 HOURS PRN
Qty: 30 TABLET | Refills: 0 | Status: SHIPPED | OUTPATIENT
Start: 2025-07-24 | End: 2025-07-25

## 2025-07-24 RX ORDER — OXYCODONE AND ACETAMINOPHEN 5; 325 MG/1; MG/1
1 TABLET ORAL EVERY 6 HOURS PRN
Qty: 30 TABLET | Refills: 0 | Status: SHIPPED | OUTPATIENT
Start: 2025-07-24 | End: 2025-07-25

## 2025-07-24 RX ORDER — ENOXAPARIN SODIUM 100 MG/ML
40 INJECTION SUBCUTANEOUS DAILY
Qty: 42 EACH | Refills: 0 | Status: SHIPPED | OUTPATIENT
Start: 2025-07-24 | End: 2025-07-25 | Stop reason: ALTCHOICE

## 2025-07-25 ENCOUNTER — TELEPHONE (OUTPATIENT)
Dept: HEMATOLOGY/ONCOLOGY | Facility: CLINIC | Age: 70
End: 2025-07-25
Payer: MEDICARE

## 2025-07-25 ENCOUNTER — TELEPHONE (OUTPATIENT)
Dept: GYNECOLOGIC ONCOLOGY | Facility: CLINIC | Age: 70
End: 2025-07-25
Payer: MEDICARE

## 2025-07-25 DIAGNOSIS — Z98.890 S/P ROBOT-ASSISTED SURGICAL PROCEDURE: ICD-10-CM

## 2025-07-25 DIAGNOSIS — K57.90 DIVERTICULAR DISEASE: ICD-10-CM

## 2025-07-25 DIAGNOSIS — Z86.718 HISTORY OF DVT (DEEP VEIN THROMBOSIS): ICD-10-CM

## 2025-07-25 DIAGNOSIS — N83.299 COMPLEX OVARIAN CYST: Primary | ICD-10-CM

## 2025-07-25 RX ORDER — OXYCODONE AND ACETAMINOPHEN 5; 325 MG/1; MG/1
1 TABLET ORAL EVERY 6 HOURS PRN
Qty: 30 TABLET | Refills: 0 | Status: SHIPPED | OUTPATIENT
Start: 2025-07-25

## 2025-07-25 RX ORDER — IBUPROFEN 600 MG/1
600 TABLET, FILM COATED ORAL EVERY 8 HOURS PRN
Qty: 30 TABLET | Refills: 0 | Status: SHIPPED | OUTPATIENT
Start: 2025-07-25

## 2025-07-25 NOTE — NURSING
Spoke with patient, scheduled to see Dr. Willams 7/30, patient verbalized agreement to time/date/location. All questions and concerns addressed at this time. Messaged Pippa Bernal so that she can follow the patient upon her return to the office.

## 2025-07-25 NOTE — TELEPHONE ENCOUNTER
"Pt hysterectomy yesterday with Dr Tejada was aborted "pelvic mass" was actually a fused left adnexa to severe diverticular disease. Referral to Dr Willams needed. Also HEM/ONC recommends 6 weeks of prophylactic dose Lovenox.     Pt contacted navigator to discuss obtaining medications after yesterdays surgery. Pt left hospital after the pharmacy had closed. Pt updated that Percocet had been sent to her [facility] pharmacy and that 2 other medications would be transferred. Pt updated that it is recommended that she begin and complete 6 weeks of Lovenox. Pt states "I saw somewhere that it is an injection. I refuse to stick myself and will not do an injection." Pt updated that Christel NP will send in an alternative medication that is a pill (Eliquis). Pt informed that Percocet, Eliquis and Ibuprofen will be sent.    Pt updated that a CRS referral has also been placed for her to see Dr Willams in her care. Pt states "if yall told me anything yesterday I have no memory of it." Pt updated that Christel NP will contact her to address and review her care following surgery yesterday. Pt verbalized understanding.   "

## 2025-07-29 ENCOUNTER — TELEPHONE (OUTPATIENT)
Dept: SURGERY | Facility: CLINIC | Age: 70
End: 2025-07-29
Payer: MEDICARE

## 2025-07-29 NOTE — TELEPHONE ENCOUNTER
Copied from CRM #[id number]. Topic: General Inquiry - Patient Advice  >> Jul 29, 2025 11:50 AM Lily wrote:  Type:  Needs Medical Advice    Who Called: pt  Symptoms (please be specific):    How long has patient had these symptoms:    Pharmacy name and phone #:    Would the patient rather a call back or a response via [facility]? call  Best Call Back Number: [phone]    Additional Information: pt has surgery 5 days ago and needs to r/s appt

## 2025-08-07 ENCOUNTER — OFFICE VISIT (OUTPATIENT)
Dept: GYNECOLOGIC ONCOLOGY | Facility: CLINIC | Age: 70
End: 2025-08-07
Payer: MEDICARE

## 2025-08-07 VITALS
BODY MASS INDEX: 21.72 KG/M2 | HEART RATE: 93 BPM | SYSTOLIC BLOOD PRESSURE: 152 MMHG | OXYGEN SATURATION: 88 % | DIASTOLIC BLOOD PRESSURE: 82 MMHG | WEIGHT: 143.31 LBS | HEIGHT: 68 IN | RESPIRATION RATE: 18 BRPM | TEMPERATURE: 98 F

## 2025-08-07 DIAGNOSIS — K57.90 DIVERTICULAR DISEASE: ICD-10-CM

## 2025-08-07 DIAGNOSIS — N83.299 COMPLEX OVARIAN CYST: ICD-10-CM

## 2025-08-07 PROCEDURE — 3079F DIAST BP 80-89 MM HG: CPT | Mod: CPTII,S$GLB,,

## 2025-08-07 PROCEDURE — 4010F ACE/ARB THERAPY RXD/TAKEN: CPT | Mod: CPTII,S$GLB,,

## 2025-08-07 PROCEDURE — 3077F SYST BP >= 140 MM HG: CPT | Mod: CPTII,S$GLB,,

## 2025-08-07 PROCEDURE — 99999 PR PBB SHADOW E&M-EST. PATIENT-LVL V: CPT | Mod: PBBFAC,,,

## 2025-08-07 PROCEDURE — 1159F MED LIST DOCD IN RCRD: CPT | Mod: CPTII,S$GLB,,

## 2025-08-07 PROCEDURE — 3288F FALL RISK ASSESSMENT DOCD: CPT | Mod: CPTII,S$GLB,,

## 2025-08-07 PROCEDURE — 1126F AMNT PAIN NOTED NONE PRSNT: CPT | Mod: CPTII,S$GLB,,

## 2025-08-07 PROCEDURE — 99024 POSTOP FOLLOW-UP VISIT: CPT | Mod: S$GLB,,,

## 2025-08-07 PROCEDURE — 1101F PT FALLS ASSESS-DOCD LE1/YR: CPT | Mod: CPTII,S$GLB,,

## 2025-08-08 RX ORDER — OXYCODONE AND ACETAMINOPHEN 5; 325 MG/1; MG/1
1 TABLET ORAL EVERY 6 HOURS PRN
Qty: 30 TABLET | Refills: 0 | Status: SHIPPED | OUTPATIENT
Start: 2025-08-08

## 2025-08-27 ENCOUNTER — OFFICE VISIT (OUTPATIENT)
Dept: SURGERY | Facility: CLINIC | Age: 70
End: 2025-08-27
Payer: MEDICARE

## 2025-08-27 VITALS
DIASTOLIC BLOOD PRESSURE: 79 MMHG | HEART RATE: 110 BPM | TEMPERATURE: 98 F | OXYGEN SATURATION: 92 % | WEIGHT: 141.31 LBS | RESPIRATION RATE: 18 BRPM | BODY MASS INDEX: 21.42 KG/M2 | HEIGHT: 68 IN | SYSTOLIC BLOOD PRESSURE: 125 MMHG

## 2025-08-27 DIAGNOSIS — K63.89 COLONIC MASS: Primary | ICD-10-CM

## 2025-08-27 DIAGNOSIS — R19.00 PELVIC MASS: Primary | ICD-10-CM

## 2025-08-27 DIAGNOSIS — K57.20 DIVERTICULITIS OF LARGE INTESTINE WITH ABSCESS WITHOUT BLEEDING: ICD-10-CM

## 2025-08-27 PROCEDURE — 3074F SYST BP LT 130 MM HG: CPT | Mod: CPTII,S$GLB,, | Performed by: COLON & RECTAL SURGERY

## 2025-08-27 PROCEDURE — 99999 PR PBB SHADOW E&M-EST. PATIENT-LVL IV: CPT | Mod: PBBFAC,,, | Performed by: COLON & RECTAL SURGERY

## 2025-08-27 PROCEDURE — 1125F AMNT PAIN NOTED PAIN PRSNT: CPT | Mod: CPTII,S$GLB,, | Performed by: COLON & RECTAL SURGERY

## 2025-08-27 PROCEDURE — 3008F BODY MASS INDEX DOCD: CPT | Mod: CPTII,S$GLB,, | Performed by: COLON & RECTAL SURGERY

## 2025-08-27 PROCEDURE — 99205 OFFICE O/P NEW HI 60 MIN: CPT | Mod: S$GLB,,, | Performed by: COLON & RECTAL SURGERY

## 2025-08-27 PROCEDURE — 1160F RVW MEDS BY RX/DR IN RCRD: CPT | Mod: CPTII,S$GLB,, | Performed by: COLON & RECTAL SURGERY

## 2025-08-27 PROCEDURE — 4010F ACE/ARB THERAPY RXD/TAKEN: CPT | Mod: CPTII,S$GLB,, | Performed by: COLON & RECTAL SURGERY

## 2025-08-27 PROCEDURE — 1101F PT FALLS ASSESS-DOCD LE1/YR: CPT | Mod: CPTII,S$GLB,, | Performed by: COLON & RECTAL SURGERY

## 2025-08-27 PROCEDURE — 3078F DIAST BP <80 MM HG: CPT | Mod: CPTII,S$GLB,, | Performed by: COLON & RECTAL SURGERY

## 2025-08-27 PROCEDURE — 1159F MED LIST DOCD IN RCRD: CPT | Mod: CPTII,S$GLB,, | Performed by: COLON & RECTAL SURGERY

## 2025-08-27 PROCEDURE — 3288F FALL RISK ASSESSMENT DOCD: CPT | Mod: CPTII,S$GLB,, | Performed by: COLON & RECTAL SURGERY

## 2025-08-28 ENCOUNTER — OFFICE VISIT (OUTPATIENT)
Dept: GYNECOLOGIC ONCOLOGY | Facility: CLINIC | Age: 70
End: 2025-08-28
Payer: MEDICARE

## 2025-08-28 VITALS
HEIGHT: 68 IN | DIASTOLIC BLOOD PRESSURE: 84 MMHG | BODY MASS INDEX: 21.68 KG/M2 | RESPIRATION RATE: 17 BRPM | HEART RATE: 96 BPM | TEMPERATURE: 98 F | SYSTOLIC BLOOD PRESSURE: 136 MMHG | WEIGHT: 143.06 LBS | OXYGEN SATURATION: 97 %

## 2025-08-28 DIAGNOSIS — N83.299 COMPLEX OVARIAN CYST: ICD-10-CM

## 2025-08-28 DIAGNOSIS — Z98.890 S/P ROBOT-ASSISTED SURGICAL PROCEDURE: Primary | ICD-10-CM

## 2025-08-28 PROCEDURE — 99999 PR PBB SHADOW E&M-EST. PATIENT-LVL IV: CPT | Mod: PBBFAC,,, | Performed by: OBSTETRICS & GYNECOLOGY

## 2025-08-29 ENCOUNTER — TELEPHONE (OUTPATIENT)
Dept: SURGERY | Facility: CLINIC | Age: 70
End: 2025-08-29
Payer: MEDICARE

## 2025-09-02 ENCOUNTER — HOSPITAL ENCOUNTER (OUTPATIENT)
Dept: RADIOLOGY | Facility: HOSPITAL | Age: 70
Discharge: HOME | End: 2025-09-02
Attending: COLON & RECTAL SURGERY
Payer: MEDICARE

## 2025-09-02 DIAGNOSIS — K63.89 COLONIC MASS: ICD-10-CM

## 2025-09-02 PROCEDURE — 25500020 PHARM REV CODE 255: Performed by: COLON & RECTAL SURGERY

## 2025-09-02 PROCEDURE — 74177 CT ABD & PELVIS W/CONTRAST: CPT | Mod: 26,,, | Performed by: RADIOLOGY

## 2025-09-02 PROCEDURE — 74177 CT ABD & PELVIS W/CONTRAST: CPT | Mod: TC

## 2025-09-02 RX ADMIN — IOHEXOL 100 ML: 350 INJECTION, SOLUTION INTRAVENOUS at 03:09

## (undated) DEVICE — SEE MEDLINE ITEM 146292

## (undated) DEVICE — GLOVE SURG ULTRA TOUCH 7.5

## (undated) DEVICE — GLOVE SURG ULTRA TOUCH 6

## (undated) DEVICE — NDL SPINAL SPINOCAN 22GX3.5

## (undated) DEVICE — REMOVER LOTION

## (undated) DEVICE — TRAY CATH FOLEY 16FR STAT LOC

## (undated) DEVICE — SYR DISP LL 5CC

## (undated) DEVICE — BLADE SURG CARBON STEEL #10

## (undated) DEVICE — SUT 2/0 36IN COATED VICRYL

## (undated) DEVICE — SEE MEDLINE ITEM 152530

## (undated) DEVICE — SYS LABEL CORRECT MED

## (undated) DEVICE — GLOVE SURGEONS ULTRA TOUCH 6.5

## (undated) DEVICE — BLADE RECIP DOUBLE SIDED

## (undated) DEVICE — DRAPE STERI U-SHAPED 47X51IN

## (undated) DEVICE — ELECTRODE REM PLYHSV RETURN 9

## (undated) DEVICE — SPONGE DERMACEA 4X4IN 12PLY

## (undated) DEVICE — SEE MEDLINE ITEM 157216

## (undated) DEVICE — CHLORAPREP 10.5 ML APPLICATOR

## (undated) DEVICE — TUBING MINIBORE EXTENSION

## (undated) DEVICE — DRAPE INCISE IOBAN 2 23X17IN

## (undated) DEVICE — PENCIL ROCKER SWITCH 10FT CORD

## (undated) DEVICE — DRAPE C ARM 42 X 120 10/BX

## (undated) DEVICE — SUT CTD VICRYL CT-1 27

## (undated) DEVICE — SUT ETHILON 3-0 FS-1 30

## (undated) DEVICE — Device

## (undated) DEVICE — WAVEGUIDE Y TAPER TIP

## (undated) DEVICE — BLADE ELECTRO EXTENDED.

## (undated) DEVICE — INTERPULSE SET

## (undated) DEVICE — SOL NACL 0.9% INJ PF/100 150

## (undated) DEVICE — SOL IRR NACL .9% 3000ML

## (undated) DEVICE — DRESSING XEROFORM FOIL PK 1X8

## (undated) DEVICE — CLOSURE SKIN STERI STRIP 1/2X4

## (undated) DEVICE — SPONGE LAP 18X18 PREWASHED

## (undated) DEVICE — DRAPE PLASTIC U 60X72

## (undated) DEVICE — PAD ABD 8X10 STERILE

## (undated) DEVICE — NDL HYPODERMIC BLUNT 18G 1.5IN

## (undated) DEVICE — DURAPREP SURG SCRUB 26ML

## (undated) DEVICE — SEE MEDLINE ITEM 152622

## (undated) DEVICE — SUT PROLENE 3-0 PS-2 BL 18IN

## (undated) DEVICE — ELECTRODE BLD 1 INCH TEFLON

## (undated) DEVICE — SET DECANTER MEDICHOICE

## (undated) DEVICE — ADHESIVE MASTISOL VIAL 48/BX

## (undated) DEVICE — SEE MEDLINE ITEM 157131

## (undated) DEVICE — GOWN TOGA SYS PEELWY ZIP 2 XL

## (undated) DEVICE — SEE MEDLINE ITEM 154981

## (undated) DEVICE — SEE MEDLINE ITEM 146313

## (undated) DEVICE — DRAPE HIP TIBURON 87X115X134

## (undated) DEVICE — NDL SAFETY 25G X 1.5 ECLIPSE

## (undated) DEVICE — SEE MEDLINE ITEM 157125